# Patient Record
Sex: FEMALE | Race: WHITE | NOT HISPANIC OR LATINO | Employment: OTHER | URBAN - NONMETROPOLITAN AREA
[De-identification: names, ages, dates, MRNs, and addresses within clinical notes are randomized per-mention and may not be internally consistent; named-entity substitution may affect disease eponyms.]

---

## 2017-02-27 ENCOUNTER — TRANSCRIBE ORDERS (OUTPATIENT)
Dept: MAMMOGRAPHY | Facility: HOSPITAL | Age: 82
End: 2017-02-27

## 2017-02-27 DIAGNOSIS — Z13.9 SCREENING: Primary | ICD-10-CM

## 2017-04-03 ENCOUNTER — APPOINTMENT (OUTPATIENT)
Dept: MAMMOGRAPHY | Facility: HOSPITAL | Age: 82
End: 2017-04-03
Attending: INTERNAL MEDICINE

## 2017-04-04 ENCOUNTER — HOSPITAL ENCOUNTER (OUTPATIENT)
Dept: MAMMOGRAPHY | Facility: HOSPITAL | Age: 82
Discharge: HOME | End: 2017-04-04
Attending: INTERNAL MEDICINE | Admitting: INTERNAL MEDICINE

## 2017-04-04 DIAGNOSIS — Z13.9 SCREENING: ICD-10-CM

## 2017-04-04 PROCEDURE — 77063 BREAST TOMOSYNTHESIS BI: CPT

## 2017-04-04 PROCEDURE — G0202 SCR MAMMO BI INCL CAD: HCPCS

## 2017-09-08 ENCOUNTER — OFFICE VISIT (OUTPATIENT)
Dept: CARDIOLOGY | Facility: CLINIC | Age: 82
End: 2017-09-08

## 2017-09-08 VITALS
BODY MASS INDEX: 26.63 KG/M2 | WEIGHT: 156 LBS | HEART RATE: 76 BPM | SYSTOLIC BLOOD PRESSURE: 136 MMHG | DIASTOLIC BLOOD PRESSURE: 60 MMHG | HEIGHT: 64 IN

## 2017-09-08 DIAGNOSIS — I11.9 HYPERTENSIVE HEART DISEASE WITHOUT HEART FAILURE: ICD-10-CM

## 2017-09-08 DIAGNOSIS — E78.5 DYSLIPIDEMIA: ICD-10-CM

## 2017-09-08 DIAGNOSIS — R06.09 DYSPNEA ON EXERTION: ICD-10-CM

## 2017-09-08 DIAGNOSIS — I10 CHRONIC HYPERTENSION: Primary | ICD-10-CM

## 2017-09-08 DIAGNOSIS — G47.33 OBSTRUCTIVE SLEEP APNEA: ICD-10-CM

## 2017-09-08 DIAGNOSIS — R00.2 INTERMITTENT PALPITATIONS: ICD-10-CM

## 2017-09-08 PROCEDURE — 99213 OFFICE O/P EST LOW 20 MIN: CPT | Performed by: INTERNAL MEDICINE

## 2017-09-08 RX ORDER — BUSPIRONE HYDROCHLORIDE 5 MG/1
5 TABLET ORAL 2 TIMES DAILY
COMMUNITY
End: 2017-09-08 | Stop reason: SDUPTHER

## 2017-09-08 RX ORDER — BUSPIRONE HYDROCHLORIDE 5 MG/1
5 TABLET ORAL 2 TIMES DAILY
Qty: 180 TABLET | Refills: 1 | Status: SHIPPED | OUTPATIENT
Start: 2017-09-08 | End: 2019-02-22

## 2017-09-08 RX ORDER — TORSEMIDE 10 MG/1
10 TABLET ORAL DAILY
Qty: 90 TABLET | Refills: 1 | Status: SHIPPED | OUTPATIENT
Start: 2017-09-08 | End: 2018-09-07 | Stop reason: SDUPTHER
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
## 2017-09-28 ENCOUNTER — HOSPITAL ENCOUNTER (OUTPATIENT)
Dept: GENERAL RADIOLOGY | Facility: HOSPITAL | Age: 82
Discharge: HOME | End: 2017-09-28
Attending: INTERNAL MEDICINE | Admitting: INTERNAL MEDICINE

## 2017-09-28 ENCOUNTER — TRANSCRIBE ORDERS (OUTPATIENT)
Dept: ADMINISTRATIVE | Facility: HOSPITAL | Age: 82
End: 2017-09-28

## 2017-09-28 ENCOUNTER — HOSPITAL ENCOUNTER (OUTPATIENT)
Dept: GENERAL RADIOLOGY | Facility: HOSPITAL | Age: 82
Discharge: HOME | End: 2017-09-28
Attending: INTERNAL MEDICINE

## 2017-09-28 DIAGNOSIS — M54.9 BACK PAIN, UNSPECIFIED BACK LOCATION, UNSPECIFIED BACK PAIN LATERALITY, UNSPECIFIED CHRONICITY: ICD-10-CM

## 2017-09-28 DIAGNOSIS — M25.551 HIP PAIN, RIGHT: Primary | ICD-10-CM

## 2017-09-28 PROCEDURE — 72110 X-RAY EXAM L-2 SPINE 4/>VWS: CPT

## 2017-09-28 PROCEDURE — 73502 X-RAY EXAM HIP UNI 2-3 VIEWS: CPT

## 2018-03-09 ENCOUNTER — TRANSCRIBE ORDERS (OUTPATIENT)
Dept: ADMINISTRATIVE | Facility: HOSPITAL | Age: 83
End: 2018-03-09

## 2018-03-09 DIAGNOSIS — Z12.39 SCREENING BREAST EXAMINATION: Primary | ICD-10-CM

## 2018-04-12 ENCOUNTER — HOSPITAL ENCOUNTER (OUTPATIENT)
Dept: MAMMOGRAPHY | Facility: HOSPITAL | Age: 83
Discharge: HOME | End: 2018-04-12
Attending: INTERNAL MEDICINE | Admitting: INTERNAL MEDICINE

## 2018-04-12 DIAGNOSIS — Z12.39 SCREENING BREAST EXAMINATION: ICD-10-CM

## 2018-04-12 PROCEDURE — 77067 SCR MAMMO BI INCL CAD: CPT

## 2018-04-12 PROCEDURE — 77063 BREAST TOMOSYNTHESIS BI: CPT

## 2018-05-16 RX ORDER — NAPROXEN SODIUM 220 MG
220 TABLET ORAL AS NEEDED
COMMUNITY
End: 2019-02-22

## 2018-05-22 ENCOUNTER — HOSPITAL ENCOUNTER (OUTPATIENT)
Facility: HOSPITAL | Age: 83
Setting detail: HOSPITAL OUTPATIENT SURGERY
Discharge: HOME | End: 2018-05-22
Attending: OPHTHALMOLOGY | Admitting: OPHTHALMOLOGY

## 2018-05-22 VITALS
SYSTOLIC BLOOD PRESSURE: 132 MMHG | OXYGEN SATURATION: 98 % | TEMPERATURE: 98.6 F | DIASTOLIC BLOOD PRESSURE: 56 MMHG | HEART RATE: 76 BPM | RESPIRATION RATE: 18 BRPM | BODY MASS INDEX: 27.11 KG/M2 | WEIGHT: 153 LBS | HEIGHT: 63 IN

## 2018-05-22 PROBLEM — H26.492 POSTERIOR CAPSULAR OPACIFICATION OF LEFT EYE, OBSCURING VISION: Status: RESOLVED | Noted: 2018-05-22 | Resolved: 2018-05-22

## 2018-05-22 PROBLEM — H26.492 POSTERIOR CAPSULAR OPACIFICATION OF LEFT EYE, OBSCURING VISION: Status: ACTIVE | Noted: 2018-05-22

## 2018-05-22 RX ORDER — TETRACAINE HYDROCHLORIDE 5 MG/ML
SOLUTION OPHTHALMIC AS NEEDED
Status: DISCONTINUED | OUTPATIENT
Start: 2018-05-22 | End: 2018-05-22 | Stop reason: HOSPADM

## 2018-05-22 RX ORDER — PREDNISOLONE ACETATE 10 MG/ML
SUSPENSION/ DROPS OPHTHALMIC
Status: DISCONTINUED
Start: 2018-05-22 | End: 2018-05-22 | Stop reason: HOSPADM

## 2018-05-22 RX ORDER — CYCLOPENTOLATE HYDROCHLORIDE 20 MG/ML
1 SOLUTION/ DROPS OPHTHALMIC ONCE
Status: COMPLETED | OUTPATIENT
Start: 2018-05-22 | End: 2018-05-22

## 2018-05-22 RX ORDER — PREDNISOLONE ACETATE 10 MG/ML
SUSPENSION/ DROPS OPHTHALMIC AS NEEDED
Status: DISCONTINUED | OUTPATIENT
Start: 2018-05-22 | End: 2018-05-22 | Stop reason: HOSPADM

## 2018-05-22 RX ORDER — TETRACAINE HYDROCHLORIDE 5 MG/ML
SOLUTION OPHTHALMIC
Status: DISCONTINUED
Start: 2018-05-22 | End: 2018-05-22 | Stop reason: HOSPADM

## 2018-05-22 RX ORDER — PHENYLEPHRINE HYDROCHLORIDE 100 MG/ML
1 SOLUTION/ DROPS OPHTHALMIC ONCE
Status: COMPLETED | OUTPATIENT
Start: 2018-05-22 | End: 2018-05-22

## 2018-05-22 RX ORDER — BALANCED SALT SOLUTION 6.4; .75; .48; .3; 3.9; 1.7 MG/ML; MG/ML; MG/ML; MG/ML; MG/ML; MG/ML
SOLUTION OPHTHALMIC AS NEEDED
Status: DISCONTINUED | OUTPATIENT
Start: 2018-05-22 | End: 2018-05-22 | Stop reason: HOSPADM

## 2018-05-22 RX ORDER — BALANCED SALT SOLUTION 6.4; .75; .48; .3; 3.9; 1.7 MG/ML; MG/ML; MG/ML; MG/ML; MG/ML; MG/ML
SOLUTION OPHTHALMIC
Status: DISCONTINUED
Start: 2018-05-22 | End: 2018-05-22 | Stop reason: HOSPADM

## 2018-05-22 RX ADMIN — CYCLOPENTOLATE HYDROCHLORIDE 1 DROP: 20 SOLUTION/ DROPS OPHTHALMIC at 13:54

## 2018-05-22 RX ADMIN — PHENYLEPHRINE HYDROCHLORIDE 1 DROP: 100 SOLUTION/ DROPS OPHTHALMIC at 13:54

## 2018-05-22 RX ADMIN — APRACLONIDINE HYDROCHLORIDE 1 DROP: 10 SOLUTION/ DROPS OPHTHALMIC at 13:52

## 2018-09-07 ENCOUNTER — OFFICE VISIT (OUTPATIENT)
Dept: CARDIOLOGY | Facility: CLINIC | Age: 83
End: 2018-09-07

## 2018-09-07 ENCOUNTER — LAB (OUTPATIENT)
Dept: LAB | Facility: HOSPITAL | Age: 83
End: 2018-09-07

## 2018-09-07 VITALS
HEART RATE: 74 BPM | WEIGHT: 152.6 LBS | SYSTOLIC BLOOD PRESSURE: 151 MMHG | BODY MASS INDEX: 27.04 KG/M2 | DIASTOLIC BLOOD PRESSURE: 72 MMHG | HEIGHT: 63 IN

## 2018-09-07 DIAGNOSIS — I10 CHRONIC HYPERTENSION: Primary | ICD-10-CM

## 2018-09-07 DIAGNOSIS — I11.9 HYPERTENSIVE HEART DISEASE WITHOUT HEART FAILURE: ICD-10-CM

## 2018-09-07 DIAGNOSIS — E78.5 DYSLIPIDEMIA: ICD-10-CM

## 2018-09-07 DIAGNOSIS — I10 CHRONIC HYPERTENSION: ICD-10-CM

## 2018-09-07 DIAGNOSIS — R00.2 INTERMITTENT PALPITATIONS: ICD-10-CM

## 2018-09-07 DIAGNOSIS — G47.33 OBSTRUCTIVE SLEEP APNEA: ICD-10-CM

## 2018-09-07 LAB
25(OH)D3 SERPL-MCNC: 15 NG/ML
BNP SERPL-MCNC: 40 PG/ML (ref 0–100)
CK SERPL-CCNC: 60 U/L (ref 26–174)
D DIMER PPP FEU-MCNC: 0.19 MG/L (FEU) (ref 0–0.5)
ERYTHROCYTE [SEDIMENTATION RATE] IN BLOOD: 3 MM/HR (ref 0–30)
TSH SERPL DL<=0.05 MIU/L-ACNC: 4.66 MIU/ML (ref 0.35–5.35)

## 2018-09-07 PROCEDURE — 85379 FIBRIN DEGRADATION QUANT: CPT

## 2018-09-07 PROCEDURE — 83880 ASSAY OF NATRIURETIC PEPTIDE: CPT

## 2018-09-07 PROCEDURE — 82550 ASSAY OF CK (CPK): CPT

## 2018-09-07 PROCEDURE — 36415 COLL VENOUS BLD VENIPUNCTURE: CPT

## 2018-09-07 PROCEDURE — 82306 VITAMIN D 25 HYDROXY: CPT

## 2018-09-07 PROCEDURE — 85652 RBC SED RATE AUTOMATED: CPT

## 2018-09-07 PROCEDURE — 84443 ASSAY THYROID STIM HORMONE: CPT

## 2018-09-07 PROCEDURE — 99214 OFFICE O/P EST MOD 30 MIN: CPT | Performed by: INTERNAL MEDICINE

## 2018-09-07 RX ORDER — TORSEMIDE 10 MG/1
10 TABLET ORAL DAILY
Qty: 100 TABLET | Refills: 3 | Status: SHIPPED | OUTPATIENT
Start: 2018-09-07 | End: 2020-06-26

## 2018-09-07 NOTE — PROGRESS NOTES
Subjective:     Encounter Date:09/07/2018    Patient ID: Sal Siu is an 83 y.o.  white female, retired /supervisor, from [place], [place].     REFERRING PHYSICIAN/INTERNIST: Raj Nugent MD  PREVIOUS NEUROSURGEON: Derek Avery MD   NEUROSURGEON: Arron Gonzalez MD  OPHTHALMOLOGIST: Meseret Bentley MD  SLEEP PHYSICIAN: Raj Benito MD, [facility]    Chief Complaint:   Chief Complaint   Patient presents with   • Hypertension     Problem List:  1. Chronic hypertension, probably essential.  2. Hypertensive cardiovascular disease:  a. Remote progressive CCS class II-III chest pain syndrome/normal EKG and acceptable remote chest x-ray, November 2006.  b. Acceptable quantitative SPECT gated Cardiolite GXT with reduced exercise capacity, acceptable LVEF (0.80), March 2007.   c. Residual CCS class I angina pectoris with NYHA Class II exertional dyspnea and fatigue syndrome with acceptable echocardiogram, June 2012.  d. Residual class I symptoms.  3. Intermittent palpitations.   4. Dyslipidemia.   5. Elevated random serum glucose level, probable type 2 prediabetes mellitus, April 2008.  6. Borderline obesity.   7. Cataract, right eye.  8. Right infrascapular arthritis type pain with corticosteroid injection (data deficit), February 2009.  9. 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  10. Remote operations:  a. Appendectomy, 1946.  b. Lower back apparent lipoma removal (data deficit).   c. Dilation and curettage, 1970.  d. Hysterectomy, 1971.  e. Lumbar disc surgery, 1997.  f. Laparoscopic cholecystectomy (data deficit).   g. Posterior capsular opacification, left eye, May 2018    Allergies   Allergen Reactions   • Sulfa Antibiotics Swelling     urticaria   • Other Nausea And Vomiting     MUSHROOMS-CAUSE 'HALLUCINATIONS'         Current Outpatient Prescriptions:   •  amLODIPine (NORVASC) 5 MG tablet, Take 5 mg by mouth Daily., Disp: , Rfl:   •   "aspirin 81 MG EC tablet, Take 81 mg by mouth Daily., Disp: , Rfl:   •  atorvastatin (LIPITOR) 40 MG tablet, Take 40 mg by mouth Daily., Disp: , Rfl:   •  busPIRone (BUSPAR) 5 MG tablet, Take 1 tablet by mouth 2 (Two) Times a Day., Disp: 180 tablet, Rfl: 1  •  furosemide (LASIX) 20 MG tablet, Take 10 mg by mouth Daily As Needed., Disp: , Rfl:   •  HYDROcodone-acetaminophen (NORCO) 5-325 MG per tablet, Take 1 tablet by mouth Every 6 (Six) Hours As Needed., Disp: , Rfl:   •  lisinopril-hydrochlorothiazide (PRINZIDE,ZESTORETIC) 20-25 MG per tablet, Take 1 tablet by mouth Daily., Disp: , Rfl:   •  meclizine (ANTIVERT) 12.5 MG tablet, Take 12.5 mg by mouth 3 (Three) Times a Day As Needed for dizziness., Disp: , Rfl:   •  naproxen sodium (ALEVE) 220 MG tablet, Take 220 mg by mouth As Needed., Disp: , Rfl:   •  nitroglycerin (NITROSTAT) 0.4 MG SL tablet, Place 0.4 mg under the tongue Every 5 (Five) Minutes As Needed for chest pain. Take no more than 3 doses in 15 minutes., Disp: , Rfl:   •  omeprazole (PriLOSEC) 20 MG capsule, Take 20 mg by mouth Daily., Disp: , Rfl:   •  potassium chloride (K-DUR,KLOR-CON) 10 MEQ CR tablet, Take 10 mEq by mouth Daily., Disp: , Rfl:   •  torsemide (DEMADEX) 10 MG tablet, Take 1 tablet by mouth Daily., Disp: 90 tablet, Rfl: 1  •  traMADol (ULTRAM) 50 MG tablet, Take 50 mg by mouth Every 6 (Six) Hours As Needed for moderate pain (4-6)., Disp: , Rfl:   •  vitamin C (ASCORBIC ACID) 500 MG tablet, Take 500 mg by mouth Daily., Disp: , Rfl:     HISTORY OF PRESENT ILLNESS: Patient returns for scheduled annual followup. She says it has been almost a year since she had lab work drawn, but it was \"okay\" the last time it was done; data deficit.  Her , Kishore, is unable to do anything anymore, so she has to do \"everything.\"  She does all the housework, yard work, grocery shopping, etc.  She rests \"pretty good\" at night and sometimes awakens feeling well rested and sometimes not.  She is not on a " "good sleep schedule and is a night owl.  She sometimes down not go to bed until 1 or 2 AM and is always awake by 6:30 AM.  If she goes to bed before 11 PM, she is awake by 2 or 3 AM.  She has never tried melatonin.  She states that she had \"horrible\" pain in her left side last week, and it went down further to the side of her abdomen.  She thinks she must have pulled a muscle or something.  She had had to empty one of their dehumidifiers that morning, and she had trouble pulling it out.  She does not have any children, so she has no one to ask to help her.  She says she will have to call someone.  Other than that, she has had no chest pain, tightness, or pressure.  She does have some swelling in her feet, and she takes either her Lasix or torsemide most days, but not if she is not going to be at home.  Patient otherwise denies chest pain, shortness of breath, PND, edema, palpitations, syncope or presyncope at this time.        Review of Systems   Constitution: Positive for malaise/fatigue.   Respiratory: Positive for shortness of breath.    Musculoskeletal: Positive for back pain.      Obtained and otherwise negative except as outlined in problem list and HPI.    Procedures       Objective:       Vitals:    09/07/18 1056 09/07/18 1058   BP: 132/65 151/72   BP Location: Left arm Left arm   Patient Position: Sitting Standing   Pulse: 65 74   Weight: 69.2 kg (152 lb 9.6 oz)    Height: 160 cm (63\")      Body mass index is 27.03 kg/m².   Last weight:  156 lbs.    Physical Exam   Constitutional: She is oriented to person, place, and time. She appears well-developed and well-nourished.   Neck: No JVD present. Carotid bruit is not present. No thyromegaly present.   Cardiovascular: Regular rhythm, S1 normal and S2 normal.  Exam reveals no gallop, no S3 and no friction rub.    Murmur heard.   Medium-pitched harsh early systolic murmur is present with a grade of 1/6  at the upper right sternal border  Pulses:       Dorsalis " pedis pulses are 2+ on the right side, and 2+ on the left side.        Posterior tibial pulses are 2+ on the right side, and 2+ on the left side.   Pulmonary/Chest: Effort normal and breath sounds normal. She has no wheezes. She has no rhonchi. She has no rales.   Abdominal: Soft. She exhibits no mass. There is no hepatosplenomegaly. There is no tenderness. There is no guarding.   Bowel sounds audible x4   Musculoskeletal: Normal range of motion. She exhibits no edema.   Lymphadenopathy:     She has no cervical adenopathy.   Neurological: She is alert and oriented to person, place, and time.   Skin: Skin is warm, dry and intact. No rash noted.   Vitals reviewed.        Lab Review: No recent laboratory results available for review today    03/09/2018: (reviewed with patient in the office today)  · FLP - total cholesterol 133, triglycerides 141, HDL-C 62, LDL-C 43, non-HDL-C 71  · CBC - WBC 7.1, RBC 4.21, hemoglobin 12.7, hematocrit 40%, platelets 240, normal indices and differential  · CMP - sodium 144, potassium 4.1, chloride 105, CO2 - 27, glucose 102, BUN 12, creatinine 0.6, calcium 9.8, protein 6.5, albumin 4.6, alk phos 95, ALT 19, AST 12, bilirubin 0.8  · Hemoglobin A1c - 6.0%  · Estimated average glucose - 125        Assessment:   Overall continued acceptable course with no interim cardiopulmonary complaints with good functional status. We will defer additional diagnostic or therapeutic intervention from a cardiac perspective at this time.       Diagnosis Plan   1. Chronic hypertension  BNP    D-dimer, Quantitative    CK    Sedimentation Rate    Vitamin D 25 Hydroxy    TSH   2. Intermittent palpitations  BNP    D-dimer, Quantitative    CK    Sedimentation Rate    Vitamin D 25 Hydroxy    TSH   3. Hypertensive heart disease without heart failure  BNP    D-dimer, Quantitative    CK    Sedimentation Rate    Vitamin D 25 Hydroxy    TSH; No recurrent angina pectoris or CHF; continue current treatment     4.  Dyslipidemia  Acceptable control, March 2018   5. Obstructive sleep apnea  Compliance stressed; will add empiric melatonin          Plan:         1. Patient to continue current medications and close follow up with the above providers other than to alter her dose of torsemide to 10 mg 1-2 daily prn edema.  2. The following laboratory studies are ordered:   A. TSH   B. BNP   C. D-dimer   D. Vitamin  D   E. ESR   F. CPK  3. Melatonin 5 mg recommended to help with sleep; if this does not help, try 10 mg  4. Influenza immunization in the fall is strongly recommended.  5. Tentative cardiology follow up in September 2019, or patient may return sooner PRN.    Transcribed by Susan Christianson for Dr. Jeyson Land at 11:15 AM on 09/07/2018    I, Jeyson Land MD, [facility], personally performed the services described in this documentation as scribed by the above named individual in my presence, and it is both accurate and complete. At 11:38 AM on 09/07/2018

## 2019-02-22 ENCOUNTER — OFFICE VISIT (OUTPATIENT)
Dept: INTERNAL MEDICINE | Facility: CLINIC | Age: 84
End: 2019-02-22

## 2019-02-22 VITALS
SYSTOLIC BLOOD PRESSURE: 124 MMHG | WEIGHT: 156 LBS | HEART RATE: 74 BPM | DIASTOLIC BLOOD PRESSURE: 70 MMHG | TEMPERATURE: 99.5 F | HEIGHT: 63 IN | BODY MASS INDEX: 27.64 KG/M2 | OXYGEN SATURATION: 97 %

## 2019-02-22 DIAGNOSIS — E55.9 VITAMIN D DEFICIENCY: ICD-10-CM

## 2019-02-22 DIAGNOSIS — I10 CHRONIC HYPERTENSION: Primary | ICD-10-CM

## 2019-02-22 DIAGNOSIS — R53.83 OTHER FATIGUE: ICD-10-CM

## 2019-02-22 DIAGNOSIS — R42 VERTIGO: ICD-10-CM

## 2019-02-22 DIAGNOSIS — E78.5 DYSLIPIDEMIA: ICD-10-CM

## 2019-02-22 PROBLEM — R06.09 DYSPNEA ON EXERTION: Status: RESOLVED | Noted: 2017-09-08 | Resolved: 2019-02-22

## 2019-02-22 PROCEDURE — 99204 OFFICE O/P NEW MOD 45 MIN: CPT | Performed by: INTERNAL MEDICINE

## 2019-02-22 NOTE — PROGRESS NOTES
"Subjective  Sal Siu is a 83 y.o. female    HPI 83-year-old female accompanied by her friend coming in to establish care here has had a long-standing history of hypertension, dyslipidemia and reflux esophagitis.  Has had complains of occasional vertigo with a sensation of room spinning around her.  This happens to her when she turns over in the bed.  Denies hearing loss no associated nausea vomiting.  Has been on low-dose diuretics for her lower extremity swelling.  Denies orthopnea or dyspnea on exertion    The following portions of the patient's history were reviewed and updated as appropriate: allergies, current medications, past family history, past medical history, past social history, past surgical history, and problem list.     Review of Systems   Constitutional: Negative.  Negative for activity change, appetite change, fatigue and fever.   HENT: Negative for congestion, ear discharge, ear pain and trouble swallowing.    Eyes: Negative for photophobia and visual disturbance.   Respiratory: Negative for cough and shortness of breath.    Cardiovascular: Negative for chest pain and palpitations.   Gastrointestinal: Negative for abdominal distention, abdominal pain, constipation, diarrhea, nausea and vomiting.   Endocrine: Negative.    Genitourinary: Negative for dysuria, hematuria and urgency.   Musculoskeletal: Positive for arthralgias. Negative for back pain, joint swelling and myalgias.   Skin: Negative for color change and rash.   Allergic/Immunologic: Negative.    Neurological: Positive for dizziness. Negative for weakness, light-headedness and headaches.   Hematological: Negative for adenopathy. Does not bruise/bleed easily.   Psychiatric/Behavioral: Negative for agitation, confusion and dysphoric mood. The patient is not nervous/anxious.        Visit Vitals  /70 Comment: AUTOMATIC   Pulse 74   Temp 99.5 °F (37.5 °C) (Temporal)   Ht 160 cm (63\")   Wt 70.8 kg (156 lb)   LMP  (LMP Unknown)   SpO2 " 97%   BMI 27.63 kg/m²       Objective  Physical Exam   Constitutional: She is oriented to person, place, and time. She appears well-developed and well-nourished. No distress.   HENT:   Nose: Nose normal.   Mouth/Throat: Oropharynx is clear and moist.   Eyes: Conjunctivae and EOM are normal. No scleral icterus.   Neck: No tracheal deviation present. No thyromegaly present.   Cardiovascular: Normal rate and regular rhythm. Exam reveals no friction rub.   No murmur heard.  Pulmonary/Chest: No respiratory distress. She has no wheezes. She has no rales.   Abdominal: Soft. She exhibits no distension and no mass. There is no tenderness. There is no guarding.   Musculoskeletal: Normal range of motion. She exhibits deformity.   Lymphadenopathy:     She has no cervical adenopathy.   Neurological: She is alert and oriented to person, place, and time. She has normal reflexes. No cranial nerve deficit. Coordination normal.   Vertigo reproduced with Merlin-Hallpike maneuver on the left side   Skin: Skin is warm and dry. No rash noted. No erythema.   Psychiatric: She has a normal mood and affect. Her behavior is normal. Judgment and thought content normal.       Diagnoses and all orders for this visit:    Chronic hypertension discussed low-salt diet continue with low-sodium diet she is on 2 different diuretics I would like to discontinue HCTZ with a future visit in the meantime continue to follow her BP readings.  Consider switching from amlodipine to a higher dose lisinopril.  This may help with reducing lower extremity swelling and may then help us cut down her use of torsemide    Dyslipidemia continue with the statins check lipid profile    Vertigo referral for physical therapy.  Meclizine as needed

## 2019-02-25 LAB
25(OH)D3+25(OH)D2 SERPL-MCNC: 47.2 NG/ML
ALBUMIN SERPL-MCNC: 4.1 G/DL (ref 3.5–5)
ALBUMIN/GLOB SERPL: 1.5 G/DL (ref 1–2)
ALP SERPL-CCNC: 95 U/L (ref 38–126)
ALT SERPL-CCNC: 41 U/L (ref 13–69)
AST SERPL-CCNC: 18 U/L (ref 15–46)
BILIRUB SERPL-MCNC: 0.9 MG/DL (ref 0.2–1.3)
BUN SERPL-MCNC: 19 MG/DL (ref 7–20)
BUN/CREAT SERPL: 31.7 (ref 7.1–23.5)
CALCIUM SERPL-MCNC: 10.1 MG/DL (ref 8.4–10.2)
CHLORIDE SERPL-SCNC: 102 MMOL/L (ref 98–107)
CHOLEST SERPL-MCNC: 141 MG/DL (ref 0–199)
CO2 SERPL-SCNC: 29 MMOL/L (ref 26–30)
CREAT SERPL-MCNC: 0.6 MG/DL (ref 0.6–1.3)
ERYTHROCYTE [DISTWIDTH] IN BLOOD BY AUTOMATED COUNT: 12.2 % (ref 11.5–14.5)
GLOBULIN SER CALC-MCNC: 2.8 GM/DL
GLUCOSE SERPL-MCNC: 107 MG/DL (ref 74–98)
HCT VFR BLD AUTO: 38.8 % (ref 37–47)
HDLC SERPL-MCNC: 58 MG/DL (ref 40–60)
HGB BLD-MCNC: 12.7 G/DL (ref 12–16)
LDLC SERPL CALC-MCNC: 53 MG/DL (ref 0–99)
MCH RBC QN AUTO: 30.1 PG (ref 27–31)
MCHC RBC AUTO-ENTMCNC: 32.7 G/DL (ref 30–37)
MCV RBC AUTO: 91.9 FL (ref 81–99)
PLATELET # BLD AUTO: 230 10*3/MM3 (ref 130–400)
POTASSIUM SERPL-SCNC: 3.8 MMOL/L (ref 3.5–5.1)
PROT SERPL-MCNC: 6.9 G/DL (ref 6.3–8.2)
RBC # BLD AUTO: 4.22 10*6/MM3 (ref 4.2–5.4)
SODIUM SERPL-SCNC: 138 MMOL/L (ref 137–145)
TRIGL SERPL-MCNC: 152 MG/DL
TSH SERPL DL<=0.005 MIU/L-ACNC: 4.51 MIU/ML (ref 0.47–4.68)
VIT B12 SERPL-MCNC: 248 PG/ML (ref 239–931)
VLDLC SERPL CALC-MCNC: 30.4 MG/DL
WBC # BLD AUTO: 6.03 10*3/MM3 (ref 4.8–10.8)

## 2019-05-02 ENCOUNTER — TELEPHONE (OUTPATIENT)
Dept: INTERNAL MEDICINE | Facility: CLINIC | Age: 84
End: 2019-05-02

## 2019-05-02 RX ORDER — AMLODIPINE BESYLATE 5 MG/1
5 TABLET ORAL DAILY
Qty: 90 TABLET | Refills: 3 | Status: SHIPPED | OUTPATIENT
Start: 2019-05-02 | End: 2020-05-15

## 2019-05-02 RX ORDER — LISINOPRIL AND HYDROCHLOROTHIAZIDE 25; 20 MG/1; MG/1
1 TABLET ORAL DAILY
Qty: 90 TABLET | Refills: 3 | Status: SHIPPED | OUTPATIENT
Start: 2019-05-02 | End: 2019-05-23

## 2019-05-23 ENCOUNTER — OFFICE VISIT (OUTPATIENT)
Dept: INTERNAL MEDICINE | Facility: CLINIC | Age: 84
End: 2019-05-23

## 2019-05-23 VITALS
HEIGHT: 63 IN | BODY MASS INDEX: 27.61 KG/M2 | OXYGEN SATURATION: 99 % | TEMPERATURE: 99.3 F | DIASTOLIC BLOOD PRESSURE: 69 MMHG | WEIGHT: 155.8 LBS | HEART RATE: 77 BPM | SYSTOLIC BLOOD PRESSURE: 127 MMHG

## 2019-05-23 DIAGNOSIS — I10 BENIGN ESSENTIAL HTN: ICD-10-CM

## 2019-05-23 DIAGNOSIS — E78.5 DYSLIPIDEMIA: Primary | ICD-10-CM

## 2019-05-23 DIAGNOSIS — K21.9 GASTROESOPHAGEAL REFLUX DISEASE, ESOPHAGITIS PRESENCE NOT SPECIFIED: ICD-10-CM

## 2019-05-23 PROCEDURE — 99214 OFFICE O/P EST MOD 30 MIN: CPT | Performed by: INTERNAL MEDICINE

## 2019-05-23 RX ORDER — ATORVASTATIN CALCIUM 40 MG/1
40 TABLET, FILM COATED ORAL DAILY
Qty: 90 TABLET | Refills: 3 | Status: SHIPPED | OUTPATIENT
Start: 2019-05-23 | End: 2020-05-15

## 2019-05-23 RX ORDER — LISINOPRIL 20 MG/1
20 TABLET ORAL DAILY
Qty: 90 TABLET | Refills: 3 | Status: SHIPPED | OUTPATIENT
Start: 2019-05-23 | End: 2019-09-11

## 2019-05-23 NOTE — PROGRESS NOTES
"Subjective  Sal Siu is a 83 y.o. female    HPI coming in for follow-up patient with chronic lower extremity edema she has hypertension and reflux esophagitis takes Demadex almost on a daily basis this was prescribed to her by her previous physician.  She denies lightheadedness dizziness    The following portions of the patient's history were reviewed and updated as appropriate: allergies, current medications, past family history, past medical history, past social history, past surgical history, and problem list.     Review of Systems   Constitutional: Positive for fatigue. Negative for activity change, appetite change and fever.   HENT: Negative for congestion, ear discharge, ear pain and trouble swallowing.    Eyes: Negative for photophobia and visual disturbance.   Respiratory: Negative for cough and shortness of breath.    Cardiovascular: Negative for chest pain and palpitations.   Gastrointestinal: Negative for abdominal distention, abdominal pain, constipation, diarrhea, nausea and vomiting.   Endocrine: Negative.    Genitourinary: Negative for dysuria, hematuria and urgency.   Musculoskeletal: Positive for arthralgias. Negative for back pain, joint swelling and myalgias.   Skin: Negative for color change and rash.   Allergic/Immunologic: Negative.    Neurological: Negative for dizziness, weakness, light-headedness and headaches.   Hematological: Negative for adenopathy. Does not bruise/bleed easily.   Psychiatric/Behavioral: Positive for sleep disturbance. Negative for agitation, confusion and dysphoric mood. The patient is not nervous/anxious.        Visit Vitals  /69 Comment: Automatic   Pulse 77   Temp 99.3 °F (37.4 °C) (Temporal)   Ht 160 cm (63\")   Wt 70.7 kg (155 lb 12.8 oz)   LMP  (LMP Unknown)   SpO2 99%   BMI 27.60 kg/m²       Objective  Physical Exam   Constitutional: She is oriented to person, place, and time. She appears well-developed and well-nourished. No distress.   HENT:   Nose: " Nose normal.   Mouth/Throat: Oropharynx is clear and moist.   Eyes: Conjunctivae and EOM are normal. No scleral icterus.   Neck: No tracheal deviation present. No thyromegaly present.   Cardiovascular: Normal rate and regular rhythm. Exam reveals no friction rub.   No murmur heard.  Pulmonary/Chest: No respiratory distress. She has no wheezes. She has no rales.   Abdominal: Soft. She exhibits no distension and no mass. There is no tenderness. There is no guarding.   Musculoskeletal: Normal range of motion. She exhibits edema and deformity.   Lymphadenopathy:     She has no cervical adenopathy.   Neurological: She is alert and oriented to person, place, and time. She has normal reflexes. No cranial nerve deficit. Coordination normal.   Skin: Skin is warm and dry. No rash noted. No erythema.   Psychiatric: She has a normal mood and affect. Her behavior is normal. Judgment and thought content normal.       Diagnoses and all orders for this visit:    Dyslipidemia stable continue with the statins    Benign essential HTN will discontinue HCTZ to avoid dual diuretic therapy will continue with lisinopril at a higher dose follow BP readings at home    Gastroesophageal reflux disease, esophagitis presence not specified continue with reflux precautions and proton pump inhibitors  Discussed recent lab work which showed mild dehydration otherwise unremarkable    Other orders  -     atorvastatin (LIPITOR) 40 MG tablet; Take 1 tablet by mouth Daily.

## 2019-06-17 ENCOUNTER — OFFICE VISIT (OUTPATIENT)
Dept: INTERNAL MEDICINE | Facility: CLINIC | Age: 84
End: 2019-06-17

## 2019-06-17 VITALS
HEIGHT: 63 IN | HEART RATE: 74 BPM | OXYGEN SATURATION: 98 % | DIASTOLIC BLOOD PRESSURE: 65 MMHG | SYSTOLIC BLOOD PRESSURE: 125 MMHG | WEIGHT: 153.12 LBS | BODY MASS INDEX: 27.13 KG/M2 | TEMPERATURE: 98.4 F

## 2019-06-17 DIAGNOSIS — E78.5 DYSLIPIDEMIA: ICD-10-CM

## 2019-06-17 DIAGNOSIS — I10 BENIGN ESSENTIAL HTN: Primary | ICD-10-CM

## 2019-06-17 PROCEDURE — G0438 PPPS, INITIAL VISIT: HCPCS | Performed by: INTERNAL MEDICINE

## 2019-06-17 NOTE — PROGRESS NOTES
QUICK REFERENCE INFORMATION:  The ABCs of the Annual Wellness Visit    Initial Medicare Wellness Visit    HEALTH RISK ASSESSMENT  83-year-old female with hypertension and dyslipidemia coming in for a wellness visit  : 1935    Recent Hospitalizations:  No hospitalization(s) within the last year..  ccc      Current Medical Providers:  Patient Care Team:  David Barrios MD as PCP - General (Internal Medicine)  Raj Nugent MD as PCP - Family Medicine  Raj Nugent MD as PCP - Claims Attributed        Smoking Status:  Social History     Tobacco Use   Smoking Status Never Smoker   Smokeless Tobacco Never Used       Alcohol Consumption:  Social History     Substance and Sexual Activity   Alcohol Use No       Depression Screen:   PHQ-2/PHQ-9 Depression Screening 2019   Little interest or pleasure in doing things 0   Feeling down, depressed, or hopeless 0   Total Score 0       Health Habits and Functional and Cognitive Screening:  Functional & Cognitive Status 2019   Do you have difficulty preparing food and eating? No   Do you have difficulty bathing yourself, getting dressed or grooming yourself? No   Do you have difficulty using the toilet? No   Do you have difficulty moving around from place to place? No   Do you have trouble with steps or getting out of a bed or a chair? No   In the past year have you fallen or experienced a near fall? Yes   Current Diet Well Balanced Diet   Dental Exam Up to date   Eye Exam Up to date   Exercise (times per week) 0 times per week   Current Exercise Activities Include No Regular Exercise   Do you need help using the phone?  No   Are you deaf or do you have serious difficulty hearing?  No   Do you need help with transportation? No   Do you need help shopping? No   Do you need help preparing meals?  No   Do you need help with housework?  No   Do you need help with laundry? No   Do you need help taking your medications? No   Do you need help managing  money? No   Do you ever drive or ride in a car without wearing a seat belt? No   Have you felt unusual stress, anger or loneliness in the last month? No   Who do you live with? Spouse   If you need help, do you have trouble finding someone available to you? No   Have you been bothered in the last four weeks by sexual problems? No   Do you have difficulty concentrating, remembering or making decisions? No           Does the patient have evidence of cognitive impairment? No    Asiprin use counseling: Does not need ASA (and currently is not on it)      Recent Lab Results:    Lab Results   Component Value Date     (H) 02/25/2019        Lab Results   Component Value Date    TRIG 152 (H) 02/25/2019    HDL 58 02/25/2019    VLDL 30.4 02/25/2019           Age-appropriate Screening Schedule:  Refer to the list below for future screening recommendations based on patient's age, sex and/or medical conditions. Orders for these recommended tests are listed in the plan section. The patient has been provided with a written plan.    Health Maintenance   Topic Date Due   • ZOSTER VACCINE (1 of 2) 06/17/2020 (Originally [birth year]/1985)   • INFLUENZA VACCINE  08/01/2019   • LIPID PANEL  02/25/2020   • TDAP/TD VACCINES (2 - Td) 12/17/2023   • PNEUMOCOCCAL VACCINES (65+ LOW/MEDIUM RISK)  Discontinued        Subjective   History of Present Illness    Sal Siu is a 83 y.o. female who presents for an Annual Wellness Visit.    The following portions of the patient's history were reviewed and updated as appropriate: allergies, current medications, past family history, past medical history, past social history, past surgical history and problem list.    Outpatient Medications Prior to Visit   Medication Sig Dispense Refill   • amLODIPine (NORVASC) 5 MG tablet Take 1 tablet by mouth Daily. 90 tablet 3   • atorvastatin (LIPITOR) 40 MG tablet Take 1 tablet by mouth Daily. 90 tablet 3   • Cholecalciferol (VITAMIN D3) 5000 units capsule  capsule Take 1 capsule by mouth Daily. 30 capsule 6   • lisinopril (PRINIVIL,ZESTRIL) 20 MG tablet Take 1 tablet by mouth Daily. 90 tablet 3   • meclizine (ANTIVERT) 12.5 MG tablet Take 12.5 mg by mouth 3 (Three) Times a Day As Needed for dizziness.     • omeprazole (PriLOSEC) 20 MG capsule Take 20 mg by mouth Daily.     • torsemide (DEMADEX) 10 MG tablet Take 1 tablet by mouth Daily. Take an extra 10 mg tablet as need no more than once a week. 100 tablet 3     No facility-administered medications prior to visit.        Patient Active Problem List   Diagnosis   • Benign essential HTN   • Intermittent palpitations   • Dyslipidemia       Advance Care Planning:  Patient has an advance directive - a copy has not been provided. Have asked the patient to send this to us to add to record    Identification of Risk Factors:  Risk factors include: increased fall risk, incontinence and polypharmacy.    Review of Systems   Constitutional: Negative.  Negative for activity change, appetite change, fatigue and fever.   HENT: Negative for congestion, ear discharge, ear pain and trouble swallowing.    Eyes: Negative for photophobia and visual disturbance.   Respiratory: Negative for cough and shortness of breath.    Cardiovascular: Negative for chest pain and palpitations.   Gastrointestinal: Negative for abdominal distention, abdominal pain, constipation, diarrhea, nausea and vomiting.   Endocrine: Negative.    Genitourinary: Negative for dysuria, hematuria and urgency.   Musculoskeletal: Positive for arthralgias. Negative for back pain, joint swelling and myalgias.        Rt ankle pain   Skin: Negative for color change and rash.   Allergic/Immunologic: Negative.    Neurological: Negative for dizziness, weakness, light-headedness and headaches.   Hematological: Negative for adenopathy. Does not bruise/bleed easily.   Psychiatric/Behavioral: Negative for agitation, confusion and dysphoric mood. The patient is not nervous/anxious.   "      Compared to one year ago, the patient feels her physical health is the same.  Compared to one year ago, the patient feels her mental health is the same.    Objective     Physical Exam   Constitutional: She is oriented to person, place, and time. She appears well-developed and well-nourished. No distress.   HENT:   Nose: Nose normal.   Mouth/Throat: Oropharynx is clear and moist.   Eyes: Conjunctivae and EOM are normal. No scleral icterus.   Neck: No tracheal deviation present. No thyromegaly present.   Cardiovascular: Normal rate and regular rhythm. Exam reveals no friction rub.   No murmur heard.  Pulmonary/Chest: No respiratory distress. She has no wheezes. She has no rales.   Abdominal: Soft. She exhibits no distension and no mass. There is no tenderness. There is no guarding.   Musculoskeletal: Normal range of motion. She exhibits edema and deformity.   Lymphadenopathy:     She has no cervical adenopathy.   Neurological: She is alert and oriented to person, place, and time. She has normal reflexes. No cranial nerve deficit. Coordination normal.   Skin: Skin is warm and dry. No rash noted. No erythema.   Psychiatric: She has a normal mood and affect. Her behavior is normal. Judgment and thought content normal.       Vitals:    06/17/19 1031   BP: 125/65   Pulse: 74   Temp: 98.4 °F (36.9 °C)   TempSrc: Temporal   SpO2: 98%   Weight: 69.5 kg (153 lb 1.9 oz)   Height: 160 cm (63\")   PainSc:   5   PainLoc: Foot       Patient's Body mass index is 27.12 kg/m². BMI is above normal parameters. Recommendations include: nutrition counseling.      Assessment/Plan   Patient Self-Management and Personalized Health Advice  The patient has been provided with information about: weight management, fall prevention and designing advance directives and preventive services including:   · Advance directive, Fall Risk assessment done, Fall Risk plan of care done, Urinary Incontinence assessment done.    Visit Diagnoses:    " ICD-10-CM ICD-9-CM   1. Benign essential HTN stable with current meds and low-sodium diet I10 401.1   2. Dyslipidemia continue with the diet restrictions, statins recent lipid profile okay E78.5 272.4       No orders of the defined types were placed in this encounter.      Outpatient Encounter Medications as of 6/17/2019   Medication Sig Dispense Refill   • amLODIPine (NORVASC) 5 MG tablet Take 1 tablet by mouth Daily. 90 tablet 3   • atorvastatin (LIPITOR) 40 MG tablet Take 1 tablet by mouth Daily. 90 tablet 3   • Cholecalciferol (VITAMIN D3) 5000 units capsule capsule Take 1 capsule by mouth Daily. 30 capsule 6   • lisinopril (PRINIVIL,ZESTRIL) 20 MG tablet Take 1 tablet by mouth Daily. 90 tablet 3   • meclizine (ANTIVERT) 12.5 MG tablet Take 12.5 mg by mouth 3 (Three) Times a Day As Needed for dizziness.     • omeprazole (PriLOSEC) 20 MG capsule Take 20 mg by mouth Daily.     • torsemide (DEMADEX) 10 MG tablet Take 1 tablet by mouth Daily. Take an extra 10 mg tablet as need no more than once a week. 100 tablet 3     No facility-administered encounter medications on file as of 6/17/2019.        Reviewed use of high risk medication in the elderly: yes  Reviewed for potential of harmful drug interactions in the elderly: yes    Follow Up:  No Follow-up on file.     An After Visit Summary and PPPS with all of these plans were given to the patient.

## 2019-06-17 NOTE — PATIENT INSTRUCTIONS
Medicare Wellness  Personal Prevention Plan of Service     Date of Office Visit:  2019  Encounter Provider:  David Barrios MD  Place of Service:  [facility]  Patient Name: Sal Siu  :  1935    As part of the Medicare Wellness portion of your visit today, we are providing you with this personalized preventive plan of services (PPPS). This plan is based upon recommendations of the [place] Preventive Services Task Force (USPSTF) and the Advisory Committee on Immunization Practices (ACIP).    This lists the preventive care services that should be considered, and provides dates of when you are due. Items listed as completed are up-to-date and do not require any further intervention.    Health Maintenance   Topic Date Due   • ZOSTER VACCINE (1 of 2) 2020 (Originally 1985)   • INFLUENZA VACCINE  2019   • LIPID PANEL  2020   • MEDICARE ANNUAL WELLNESS  2020   • TDAP/TD VACCINES (2 - Td) 2023   • PNEUMOCOCCAL VACCINES (65+ LOW/MEDIUM RISK)  Discontinued       No orders of the defined types were placed in this encounter.      No Follow-up on file.

## 2019-08-07 ENCOUNTER — TELEPHONE (OUTPATIENT)
Dept: INTERNAL MEDICINE | Facility: CLINIC | Age: 84
End: 2019-08-07

## 2019-08-07 NOTE — TELEPHONE ENCOUNTER
Patient came into the office today requesting a refill on her Omeprazole 20 MG x 1 day.     She is out of the medication.     [facility]

## 2019-08-08 RX ORDER — OMEPRAZOLE 20 MG/1
20 CAPSULE, DELAYED RELEASE ORAL DAILY
Qty: 90 CAPSULE | Refills: 3 | Status: SHIPPED | OUTPATIENT
Start: 2019-08-08 | End: 2020-08-13

## 2019-09-10 NOTE — PROGRESS NOTES
Subjective:     Encounter Date:09/11/2019    Patient ID: Sal Siu is an 84 y.o.  white female, retired /supervisor, from [place], [place].     REFERRING PHYSICIAN/INTERNIST: Raj Nugent MD  CURRENT INTERNIST:  David Barrios MD  PREVIOUS NEUROSURGEON: Derek Avery MD   NEUROSURGEON: Arron Gonzalez MD  OPHTHALMOLOGIST: Meseret Bentley MD  SLEEP PHYSICIAN: Raj Benito MD, [facility]    Chief Complaint:   Chief Complaint   Patient presents with   • Hypertension     Problem List:  1. Chronic hypertension, probably essential.  2. Hypertensive cardiovascular disease:  a. Remote progressive CCS class II-III chest pain syndrome/normal EKG and acceptable remote chest x-ray, November 2006.  b. Acceptable quantitative SPECT gated Cardiolite GXT with reduced exercise capacity, acceptable LVEF (0.80), March 2007.   c. Residual CCS class I angina pectoris with NYHA Class II exertional dyspnea and fatigue syndrome with acceptable echocardiogram, June 2012.  d. Residual CCS class 0 angina pectoris/NYHA class II exertional dyspnea and fatigue with persistent pedal edema, September 2019  3. Intermittent palpitations.   4. Dyslipidemia.   5. Elevated random serum glucose level, probable type 2 prediabetes mellitus, April 2008.  6. Borderline obesity.   7. Cataract, right eye.  8. Right infrascapular arthritis type pain with corticosteroid injection (data deficit), February 2009.  9. 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  10. Remote operations:  a. Appendectomy, 1946.  b. Lower back apparent lipoma removal (data deficit).   c. Dilation and curettage, 1970.  d. Hysterectomy, 1971.  e. Lumbar disc surgery, 1997.  f. Laparoscopic cholecystectomy (data deficit).   g. Posterior capsular opacification, left eye, May 2018    Allergies   Allergen Reactions   • Sulfa Antibiotics Swelling     urticaria   • Other Nausea And Vomiting     MUSHROOMS-CAUSE  "'HALLUCINATIONS'       Current Outpatient Medications:   •  amLODIPine (NORVASC) 5 MG tablet, Take 1 tablet by mouth Daily., Disp: 90 tablet, Rfl: 3  •  Ascorbic Acid (VITAMIN C PO), Take  by mouth Daily., Disp: , Rfl:   •  atorvastatin (LIPITOR) 40 MG tablet, Take 1 tablet by mouth Daily., Disp: 90 tablet, Rfl: 3  •  Cholecalciferol (VITAMIN D3) 5000 units capsule capsule, Take 1 capsule by mouth Daily., Disp: 30 capsule, Rfl: 6  •  Cyanocobalamin (VITAMIN B-12 PO), Take  by mouth Daily., Disp: , Rfl:   •  lisinopril-hydrochlorothiazide (PRINZIDE,ZESTORETIC) 20-25 MG per tablet, Take 1 tablet by mouth Daily., Disp: , Rfl:   •  meclizine (ANTIVERT) 12.5 MG tablet, Take 12.5 mg by mouth 3 (Three) Times a Day As Needed for dizziness., Disp: , Rfl:   •  omeprazole (priLOSEC) 20 MG capsule, Take 1 capsule by mouth Daily., Disp: 90 capsule, Rfl: 3  •  torsemide (DEMADEX) 10 MG tablet, Take 1 tablet by mouth Daily. Take an extra 10 mg tablet as need no more than once a week., Disp: 100 tablet, Rfl: 3    History of Present Illness: Patient returns for scheduled annual followup. She says that she has done well since last being seen in our office other than \"falling a lot.\"  She does not pass out; she just has been \"clumsy.\"  She twisted her ankle one time, but she had been sitting down for a long time, and she thinks her foot was asleep.  She did not take her torsemide yesterday because she was \"out and about,\" and her ankles are swollen this morning.  She has not had any chest pain, tightness, or pressure but does have some shortness of breath.  She does all of her own housework and running of errands; her  is unable to help her at all.  She and her  just recently went to [place], [place] to an antique glass convention.  He likes to collect this antique glass; she says \"if he sees something he likes, he buys it, and we don't need it.\"  The patient is scheduled for followup with Dr. Barrios in November 2019.  " "Alterations in medication are discussed with her, and she is told that she will need to have labs drawn in a week or 2.  Patient otherwise denies chest pain, PND, palpitations, syncope or presyncope at this time.        Review of Systems   Constitution: Positive for malaise/fatigue.   Cardiovascular: Positive for dyspnea on exertion.   Respiratory: Positive for shortness of breath.    Endocrine:        Excessive hunger   Skin: Positive for dry skin.   Musculoskeletal: Positive for arthritis, back pain, falls and muscle cramps.   Neurological: Positive for excessive daytime sleepiness and loss of balance.      Obtained and negative except as outlined in problem list and HPI.    Procedures       Objective:       Vitals:    09/11/19 1106 09/11/19 1107   BP: 142/70 138/64   BP Location: Left arm Left arm   Patient Position: Sitting Standing   Pulse: 68 61   Weight: 70.3 kg (155 lb)    Height: 158.8 cm (62.5\")      Body mass index is 27.9 kg/m².   Last weight:  152 lbs.    Physical Exam   Constitutional: She is oriented to person, place, and time. She appears well-developed and well-nourished.   Neck: No JVD present. Carotid bruit is not present. No thyromegaly present.   Cardiovascular: Regular rhythm, S1 normal and S2 normal. Exam reveals no gallop, no S3 and no friction rub.   Murmur heard.   Medium-pitched early systolic murmur is present with a grade of 2/6 at the lower left sternal border.  Pulses:       Carotid pulses are 1+ on the right side, and 1+ on the left side.       Radial pulses are 1+ on the right side, and 1+ on the left side.        Femoral pulses are 1+ on the right side, and 1+ on the left side.       Popliteal pulses are 1+ on the right side, and 1+ on the left side.        Dorsalis pedis pulses are 1+ on the right side, and 1+ on the left side.        Posterior tibial pulses are 1+ on the right side, and 1+ on the left side.   Pulmonary/Chest: Effort normal. She has decreased breath sounds. She has " no wheezes. She has no rhonchi. She has no rales.   Abdominal: Soft. She exhibits no mass. There is no hepatosplenomegaly. There is no tenderness. There is no guarding.   Bowel sounds audible x4   Musculoskeletal: Normal range of motion. She exhibits edema (1+ bipedal).   Lymphadenopathy:     She has no cervical adenopathy.   Neurological: She is alert and oriented to person, place, and time.   Skin: Skin is warm, dry and intact. No rash noted.   Vitals reviewed.        Lab Review:   Lab Results   Component Value Date    BUN 19 02/25/2019    CREATININE 0.60 02/25/2019    EGFRIFNONA 95 02/25/2019    EGFRIFAFRI 116 02/25/2019    BCR 31.7 (H) 02/25/2019    CO2 29.0 02/25/2019    CALCIUM 10.1 02/25/2019    PROTENTOTREF 6.9 02/25/2019    ALBUMIN 4.10 02/25/2019    LABIL2 1.5 02/25/2019    AST 18 02/25/2019    ALT 41 02/25/2019   Sodium - 138  Potassium - 3.8  Chloride - 102    Lab Results   Component Value Date    WBC 6.03 02/25/2019    HGB 12.7 02/25/2019    HCT 38.8 02/25/2019    MCV 91.9 02/25/2019     02/25/2019       Lab Results   Component Value Date    TSH 4.510 02/25/2019       Lab Results   Component Value Date    TRIG 152 (H) 02/25/2019     Lab Results   Component Value Date    HDL 58 02/25/2019 02/25/2019:  · Total cholesterol 141, LDL-C 53  · Vitamin D - 47.2  · Vitamin B12 - 248    09/07/2018: (reviewed with patient by letter)  · BNP - 40.0  · TSH - 4.662  · Vitamin D - 15.0  · Sed rate - 3  · Creatine kinase - 60  · D-dimer 0.19      Assessment:       Overall continued acceptable course with no new interim cardiopulmonary complaints with acceptable functional status. We will defer additional diagnostic or therapeutic intervention from a cardiac perspective at this time other than to make some adjustment in her diuretic therapy.     Diagnosis Plan   1. Benign essential HTN  Adult Transthoracic Echo Complete W/ Cont if Necessary Per Protocol    Basic Metabolic Panel    Magnesium   2. Intermittent  palpitations  Adult Transthoracic Echo Complete W/ Cont if Necessary Per Protocol   3. Dyslipidemia  No recent laboratory to review; continue heart healthy diet and atorvastatin   4. Swelling  See below   5. Shortness of breath  BNP; see below   6. Hypertensive heart disease without heart failure  No recurrent angina pectoris or severe CHF on current activity schedule; continue current treatment with adjustment as outlined below            Plan:         1. Patient to continue current medications and close follow up with the above providers with the following alterations:   A. Add aldactone 12.5 mg daily   B. In 2 weeks, update us on symptoms, as well as obtain BNP/BMP/magnesium level  2. Tentative cardiology follow up in September 2020 with same-day echocardiogram, or patient may return sooner PRN.    Transcribed by Susan Christianson for Dr. Jeyson Land at 11:16 AM on 09/11/2019    IJeyson MD, [facility], personally performed the services described in this documentation as scribed by the above named individual in my presence, and it is both accurate and complete. At 11:22 AM on 09/11/2019

## 2019-09-11 ENCOUNTER — OFFICE VISIT (OUTPATIENT)
Dept: CARDIOLOGY | Facility: CLINIC | Age: 84
End: 2019-09-11

## 2019-09-11 VITALS
BODY MASS INDEX: 27.46 KG/M2 | WEIGHT: 155 LBS | HEART RATE: 61 BPM | HEIGHT: 63 IN | DIASTOLIC BLOOD PRESSURE: 64 MMHG | SYSTOLIC BLOOD PRESSURE: 138 MMHG

## 2019-09-11 DIAGNOSIS — R06.02 SHORTNESS OF BREATH: ICD-10-CM

## 2019-09-11 DIAGNOSIS — I10 BENIGN ESSENTIAL HTN: Primary | ICD-10-CM

## 2019-09-11 DIAGNOSIS — E78.5 DYSLIPIDEMIA: ICD-10-CM

## 2019-09-11 DIAGNOSIS — R00.2 INTERMITTENT PALPITATIONS: ICD-10-CM

## 2019-09-11 DIAGNOSIS — I11.9 HYPERTENSIVE HEART DISEASE WITHOUT HEART FAILURE: ICD-10-CM

## 2019-09-11 DIAGNOSIS — R60.9 SWELLING: ICD-10-CM

## 2019-09-11 PROCEDURE — 99214 OFFICE O/P EST MOD 30 MIN: CPT | Performed by: INTERNAL MEDICINE

## 2019-09-11 RX ORDER — SPIRONOLACTONE 25 MG/1
12.5 TABLET ORAL DAILY
Qty: 15 TABLET | Refills: 11 | Status: SHIPPED | OUTPATIENT
Start: 2019-09-11 | End: 2020-09-16

## 2019-09-11 RX ORDER — LISINOPRIL AND HYDROCHLOROTHIAZIDE 25; 20 MG/1; MG/1
1 TABLET ORAL DAILY
COMMUNITY
End: 2019-11-25

## 2019-09-11 RX ORDER — CHOLECALCIFEROL (VITAMIN D3) 125 MCG
500 CAPSULE ORAL DAILY
COMMUNITY

## 2019-10-12 LAB
BNP SERPL-MCNC: 69.5 PG/ML (ref 0–100)
BUN SERPL-MCNC: 12 MG/DL (ref 8–23)
BUN/CREAT SERPL: 18.2 (ref 7–25)
CALCIUM SERPL-MCNC: 9.4 MG/DL (ref 8.6–10.5)
CHLORIDE SERPL-SCNC: 104 MMOL/L (ref 98–107)
CO2 SERPL-SCNC: 27.1 MMOL/L (ref 22–29)
CREAT SERPL-MCNC: 0.66 MG/DL (ref 0.57–1)
GLUCOSE SERPL-MCNC: 96 MG/DL (ref 65–99)
MAGNESIUM SERPL-MCNC: 1.9 MG/DL (ref 1.6–2.4)
POTASSIUM SERPL-SCNC: 4.2 MMOL/L (ref 3.5–5.2)
SODIUM SERPL-SCNC: 141 MMOL/L (ref 136–145)

## 2019-10-23 ENCOUNTER — FLU SHOT (OUTPATIENT)
Dept: INTERNAL MEDICINE | Facility: CLINIC | Age: 84
End: 2019-10-23

## 2019-10-23 DIAGNOSIS — Z23 NEED FOR INFLUENZA VACCINATION: Primary | ICD-10-CM

## 2019-10-23 PROCEDURE — G0008 ADMIN INFLUENZA VIRUS VAC: HCPCS | Performed by: INTERNAL MEDICINE

## 2019-10-23 PROCEDURE — 90653 IIV ADJUVANT VACCINE IM: CPT | Performed by: INTERNAL MEDICINE

## 2019-10-28 ENCOUNTER — OFFICE VISIT (OUTPATIENT)
Dept: INTERNAL MEDICINE | Facility: CLINIC | Age: 84
End: 2019-10-28

## 2019-10-28 VITALS
OXYGEN SATURATION: 97 % | BODY MASS INDEX: 27.64 KG/M2 | HEART RATE: 71 BPM | TEMPERATURE: 99.1 F | SYSTOLIC BLOOD PRESSURE: 142 MMHG | HEIGHT: 63 IN | DIASTOLIC BLOOD PRESSURE: 66 MMHG | WEIGHT: 156 LBS

## 2019-10-28 DIAGNOSIS — R35.0 URINE FREQUENCY: Primary | ICD-10-CM

## 2019-10-28 LAB
BILIRUB BLD-MCNC: NEGATIVE MG/DL
CLARITY, POC: CLEAR
COLOR UR: YELLOW
GLUCOSE UR STRIP-MCNC: NEGATIVE MG/DL
KETONES UR QL: NEGATIVE
LEUKOCYTE EST, POC: ABNORMAL
NITRITE UR-MCNC: POSITIVE MG/ML
PH UR: 6 [PH] (ref 5–8)
PROT UR STRIP-MCNC: NEGATIVE MG/DL
RBC # UR STRIP: NEGATIVE /UL
SP GR UR: 1.01 (ref 1–1.03)
UROBILINOGEN UR QL: NORMAL

## 2019-10-28 PROCEDURE — 81003 URINALYSIS AUTO W/O SCOPE: CPT | Performed by: INTERNAL MEDICINE

## 2019-10-28 PROCEDURE — 99213 OFFICE O/P EST LOW 20 MIN: CPT | Performed by: INTERNAL MEDICINE

## 2019-10-28 RX ORDER — CIPROFLOXACIN 500 MG/1
500 TABLET, FILM COATED ORAL 2 TIMES DAILY
Qty: 12 TABLET | Refills: 0 | Status: SHIPPED | OUTPATIENT
Start: 2019-10-28 | End: 2019-11-25

## 2019-10-28 NOTE — PROGRESS NOTES
"Subjective  Sal Siu is a 84 y.o. female    HPI coming in with complains of dysuria and urinary frequency for about a week.  No associated fever or chills non-smoker    The following portions of the patient's history were reviewed and updated as appropriate: allergies, current medications, past family history, past medical history, past social history, past surgical history, and problem list.     Review of Systems   Constitutional: Positive for fatigue. Negative for fever.   Gastrointestinal: Negative for abdominal distention and abdominal pain.   Genitourinary: Positive for difficulty urinating, dysuria, frequency and urgency.       Visit Vitals  /66 Comment: Automatic   Pulse 71   Temp 99.1 °F (37.3 °C) (Temporal)   Ht 158.8 cm (62.5\")   Wt 70.8 kg (156 lb)   LMP  (LMP Unknown)   SpO2 97%   BMI 28.08 kg/m²       Objective  Physical Exam   Constitutional: She appears well-developed and well-nourished.   Abdominal: She exhibits no distension. There is no tenderness. There is no guarding.   Musculoskeletal: She exhibits no tenderness or deformity.   Vitals reviewed.      Diagnoses and all orders for this visit:    Urine frequency.  Urine analysis without evidence of infection.  Check culture.  Had similar symptoms about a year ago.  -     POC Urinalysis Dipstick, Automated  -     Urine Culture - Urine, Urine, Clean Catch    Other orders  -     ciprofloxacin (CIPRO) 500 MG tablet; Take 1 tablet by mouth 2 (Two) Times a Day.        "

## 2019-11-02 LAB
BACTERIA UR CULT: ABNORMAL
BACTERIA UR CULT: ABNORMAL
OTHER ANTIBIOTIC SUSC ISLT: ABNORMAL

## 2019-11-25 ENCOUNTER — OFFICE VISIT (OUTPATIENT)
Dept: INTERNAL MEDICINE | Facility: CLINIC | Age: 84
End: 2019-11-25

## 2019-11-25 VITALS
HEART RATE: 53 BPM | SYSTOLIC BLOOD PRESSURE: 134 MMHG | BODY MASS INDEX: 27.46 KG/M2 | WEIGHT: 155 LBS | OXYGEN SATURATION: 99 % | DIASTOLIC BLOOD PRESSURE: 64 MMHG | TEMPERATURE: 99.1 F | HEIGHT: 63 IN

## 2019-11-25 DIAGNOSIS — E78.5 DYSLIPIDEMIA: ICD-10-CM

## 2019-11-25 DIAGNOSIS — N39.41 URGE INCONTINENCE: ICD-10-CM

## 2019-11-25 DIAGNOSIS — I10 BENIGN ESSENTIAL HTN: Primary | ICD-10-CM

## 2019-11-25 DIAGNOSIS — I11.9 HYPERTENSIVE HEART DISEASE WITHOUT HEART FAILURE: ICD-10-CM

## 2019-11-25 PROCEDURE — 99214 OFFICE O/P EST MOD 30 MIN: CPT | Performed by: INTERNAL MEDICINE

## 2019-11-25 RX ORDER — TOLTERODINE 2 MG/1
2 CAPSULE, EXTENDED RELEASE ORAL DAILY
Qty: 30 CAPSULE | Refills: 2 | Status: SHIPPED | OUTPATIENT
Start: 2019-11-25 | End: 2019-12-18

## 2019-11-25 RX ORDER — LISINOPRIL 20 MG/1
20 TABLET ORAL DAILY
Refills: 3 | COMMUNITY
Start: 2019-10-28 | End: 2020-05-14 | Stop reason: SDUPTHER

## 2019-11-25 NOTE — PROGRESS NOTES
"Subjective  Sal Siu is a 84 y.o. female    HPI coming in for follow-up patient with chronic lower extremity edema has had reflux esophagitis, hypertension and dyslipidemia currently with complaints of urinary urgency    The following portions of the patient's history were reviewed and updated as appropriate: allergies, current medications, past family history, past medical history, past social history, past surgical history, and problem list.     Review of Systems   Constitutional: Negative.  Negative for activity change, appetite change, fatigue and fever.   HENT: Negative for congestion, ear discharge, ear pain and trouble swallowing.    Eyes: Negative for photophobia and visual disturbance.   Respiratory: Negative for cough and shortness of breath.    Cardiovascular: Positive for leg swelling. Negative for chest pain and palpitations.   Gastrointestinal: Negative for abdominal distention, abdominal pain, constipation, diarrhea, nausea and vomiting.   Endocrine: Negative.    Genitourinary: Positive for urgency. Negative for dysuria and hematuria.   Musculoskeletal: Positive for arthralgias. Negative for back pain, joint swelling and myalgias.   Skin: Negative for color change and rash.   Allergic/Immunologic: Negative.    Neurological: Negative for dizziness, weakness, light-headedness and headaches.   Hematological: Negative for adenopathy. Does not bruise/bleed easily.   Psychiatric/Behavioral: Negative for agitation, confusion and dysphoric mood. The patient is not nervous/anxious.        Visit Vitals  /64 Comment: Automatic   Pulse 53   Temp 99.1 °F (37.3 °C) (Temporal)   Ht 158.8 cm (62.5\")   Wt 70.3 kg (155 lb)   LMP  (LMP Unknown)   SpO2 99%   BMI 27.90 kg/m²       Objective  Physical Exam   Constitutional: She is oriented to person, place, and time. She appears well-developed and well-nourished. No distress.   HENT:   Nose: Nose normal.   Mouth/Throat: Oropharynx is clear and moist.   Eyes: " Conjunctivae and EOM are normal. No scleral icterus.   Neck: No tracheal deviation present. No thyromegaly present.   Cardiovascular: Normal rate and regular rhythm. Exam reveals no friction rub.   No murmur heard.  Pulmonary/Chest: No respiratory distress. She has no wheezes. She has no rales.   Abdominal: Soft. She exhibits no distension and no mass. There is no tenderness. There is no guarding.   Musculoskeletal: Normal range of motion. She exhibits no deformity.   Lymphadenopathy:     She has no cervical adenopathy.   Neurological: She is alert and oriented to person, place, and time. She has normal reflexes. No cranial nerve deficit. Coordination normal.   Skin: Skin is warm and dry. No rash noted. No erythema.   Psychiatric: She has a normal mood and affect. Her behavior is normal. Judgment and thought content normal.       Diagnoses and all orders for this visit:    Benign essential HTN stable with current meds and low-salt diet    Dyslipidemia continue the statins follow lipid profile    Hypertensive heart disease without heart failure stable with current meds    Urge incontinence urine analysis without evidence of infection trial of Detrol LA at nighttime    Other orders  -     lisinopril (PRINIVIL,ZESTRIL) 20 MG tablet; Take 20 mg by mouth Daily.

## 2019-12-17 ENCOUNTER — TELEPHONE (OUTPATIENT)
Dept: INTERNAL MEDICINE | Facility: CLINIC | Age: 84
End: 2019-12-17

## 2019-12-18 DIAGNOSIS — N39.41 URGE INCONTINENCE: Primary | ICD-10-CM

## 2019-12-18 DIAGNOSIS — R35.0 URINE FREQUENCY: ICD-10-CM

## 2020-05-15 RX ORDER — ATORVASTATIN CALCIUM 40 MG/1
TABLET, FILM COATED ORAL
Qty: 90 TABLET | Refills: 0 | Status: SHIPPED | OUTPATIENT
Start: 2020-05-15 | End: 2020-08-13

## 2020-05-15 RX ORDER — AMLODIPINE BESYLATE 5 MG/1
TABLET ORAL
Qty: 90 TABLET | Refills: 0 | Status: SHIPPED | OUTPATIENT
Start: 2020-05-15 | End: 2020-08-13

## 2020-05-18 RX ORDER — LISINOPRIL 20 MG/1
20 TABLET ORAL DAILY
Qty: 30 TABLET | Refills: 3 | Status: SHIPPED | OUTPATIENT
Start: 2020-05-18 | End: 2020-08-13

## 2020-06-16 DIAGNOSIS — R00.2 INTERMITTENT PALPITATIONS: Primary | ICD-10-CM

## 2020-06-26 RX ORDER — TORSEMIDE 10 MG/1
TABLET ORAL
Qty: 100 TABLET | Refills: 0 | Status: SHIPPED | OUTPATIENT
Start: 2020-06-26 | End: 2021-08-24

## 2020-07-29 ENCOUNTER — OFFICE VISIT (OUTPATIENT)
Dept: INTERNAL MEDICINE | Facility: CLINIC | Age: 85
End: 2020-07-29

## 2020-07-29 ENCOUNTER — HOSPITAL ENCOUNTER (OUTPATIENT)
Dept: CT IMAGING | Facility: HOSPITAL | Age: 85
Discharge: HOME | End: 2020-07-29
Admitting: NURSE PRACTITIONER

## 2020-07-29 VITALS
SYSTOLIC BLOOD PRESSURE: 150 MMHG | OXYGEN SATURATION: 98 % | HEIGHT: 62 IN | DIASTOLIC BLOOD PRESSURE: 78 MMHG | HEART RATE: 93 BPM | WEIGHT: 146 LBS | BODY MASS INDEX: 26.87 KG/M2 | TEMPERATURE: 98.5 F

## 2020-07-29 DIAGNOSIS — K57.92 DIVERTICULITIS: ICD-10-CM

## 2020-07-29 DIAGNOSIS — R10.30 LOWER ABDOMINAL PAIN: ICD-10-CM

## 2020-07-29 DIAGNOSIS — B35.1 ONYCHOMYCOSIS: ICD-10-CM

## 2020-07-29 DIAGNOSIS — R39.198 DIFFICULTY URINATING: ICD-10-CM

## 2020-07-29 DIAGNOSIS — R10.30 INGUINAL PAIN, UNSPECIFIED LATERALITY: Primary | ICD-10-CM

## 2020-07-29 LAB
BILIRUB BLD-MCNC: NEGATIVE MG/DL
CLARITY, POC: ABNORMAL
COLOR UR: YELLOW
GLUCOSE UR STRIP-MCNC: NEGATIVE MG/DL
KETONES UR QL: NEGATIVE
LEUKOCYTE EST, POC: ABNORMAL
NITRITE UR-MCNC: NEGATIVE MG/ML
PH UR: 6 [PH] (ref 5–8)
PROT UR STRIP-MCNC: ABNORMAL MG/DL
RBC # UR STRIP: NEGATIVE /UL
SP GR UR: 1.03 (ref 1–1.03)
UROBILINOGEN UR QL: NORMAL

## 2020-07-29 PROCEDURE — 74176 CT ABD & PELVIS W/O CONTRAST: CPT

## 2020-07-29 PROCEDURE — 81003 URINALYSIS AUTO W/O SCOPE: CPT | Performed by: NURSE PRACTITIONER

## 2020-07-29 PROCEDURE — 99214 OFFICE O/P EST MOD 30 MIN: CPT | Performed by: NURSE PRACTITIONER

## 2020-07-29 RX ORDER — AMOXICILLIN AND CLAVULANATE POTASSIUM 875; 125 MG/1; MG/1
1 TABLET, FILM COATED ORAL 2 TIMES DAILY
Qty: 20 TABLET | Refills: 0 | Status: SHIPPED | OUTPATIENT
Start: 2020-07-29 | End: 2020-08-08

## 2020-08-03 NOTE — TELEPHONE ENCOUNTER
PT IS REQUESTING A REFILL FOR     PT CALL BACK [phone]    lisinopril (PRINIVIL,ZESTRIL) 20 MG tablet  PT STATES IT ALSO SAYS ON THE BOTTLE HYDROCHLOROTHIAZIDE     PT HAS 1 TABLET LEFT      TRICIA CANTU         
70M presents to the ED s/p fall last night around 9pm. He says he had a few glasses of wine and while going up the steps with his glass, he lost his balance falling backward. + head injury, No LOC. His wife was present who bandaged him up. This morning, he awoke with b/l hand swelling and the bleeding from one wound continues. He also has right chest wall pain. Preceding the fall, no dizziness or chest pain. No palpitations.   He has PMH of afib, s/p ablation. He has h/o ablation. He also received his tetanus (with pertusis) because his daughter is expecting to deliver shortly. This was within the past week with [facility]. Other PMH include HTN, HLD, Cervical and Lumbar Laminectomy.

## 2020-08-04 ENCOUNTER — PRIOR AUTHORIZATION (OUTPATIENT)
Dept: INTERNAL MEDICINE | Facility: CLINIC | Age: 85
End: 2020-08-04

## 2020-08-04 NOTE — TELEPHONE ENCOUNTER
PA for Jublia 10% solution has been done via Cover My Meds     Waiting on response:    Maulik's covered (formulary) drugs are terbinafine HCl tablet, itraconazole capsule, griseofulvin ultramicrosize tablet AND ciclopirox topical solution. If a covered (formulary) drug is prescribed

## 2020-08-07 NOTE — TELEPHONE ENCOUNTER
PA denied     Preferred medications     Ciclopirox topical solution   Griseofulvin  ltramicrosize tablet  Itraconazole capsule  terbinafine tablet

## 2020-08-13 RX ORDER — ATORVASTATIN CALCIUM 40 MG/1
TABLET, FILM COATED ORAL
Qty: 90 TABLET | Refills: 3 | Status: SHIPPED | OUTPATIENT
Start: 2020-08-13 | End: 2021-10-13

## 2020-08-13 RX ORDER — OMEPRAZOLE 20 MG/1
CAPSULE, DELAYED RELEASE ORAL
Qty: 90 CAPSULE | Refills: 3 | Status: SHIPPED | OUTPATIENT
Start: 2020-08-13 | End: 2021-10-15 | Stop reason: SDUPTHER

## 2020-08-13 RX ORDER — LISINOPRIL 20 MG/1
TABLET ORAL
Qty: 90 TABLET | Refills: 3 | Status: SHIPPED | OUTPATIENT
Start: 2020-08-13 | End: 2021-03-25 | Stop reason: SDUPTHER

## 2020-08-13 RX ORDER — AMLODIPINE BESYLATE 5 MG/1
TABLET ORAL
Qty: 90 TABLET | Refills: 3 | Status: SHIPPED | OUTPATIENT
Start: 2020-08-13 | End: 2021-03-25 | Stop reason: SDUPTHER

## 2020-08-31 ENCOUNTER — OFFICE VISIT (OUTPATIENT)
Dept: INTERNAL MEDICINE | Facility: CLINIC | Age: 85
End: 2020-08-31

## 2020-08-31 VITALS
SYSTOLIC BLOOD PRESSURE: 128 MMHG | WEIGHT: 148.8 LBS | OXYGEN SATURATION: 99 % | HEART RATE: 73 BPM | BODY MASS INDEX: 27.38 KG/M2 | TEMPERATURE: 97.1 F | DIASTOLIC BLOOD PRESSURE: 76 MMHG | HEIGHT: 62 IN

## 2020-08-31 DIAGNOSIS — B35.1 ONYCHOMYCOSIS: ICD-10-CM

## 2020-08-31 DIAGNOSIS — Z09 FOLLOW UP: Primary | ICD-10-CM

## 2020-08-31 DIAGNOSIS — E78.5 DYSLIPIDEMIA: ICD-10-CM

## 2020-08-31 DIAGNOSIS — Z23 NEED FOR INFLUENZA VACCINATION: ICD-10-CM

## 2020-08-31 DIAGNOSIS — I10 BENIGN ESSENTIAL HTN: ICD-10-CM

## 2020-08-31 PROCEDURE — G0008 ADMIN INFLUENZA VIRUS VAC: HCPCS | Performed by: NURSE PRACTITIONER

## 2020-08-31 PROCEDURE — 99214 OFFICE O/P EST MOD 30 MIN: CPT | Performed by: NURSE PRACTITIONER

## 2020-08-31 PROCEDURE — 90653 IIV ADJUVANT VACCINE IM: CPT | Performed by: NURSE PRACTITIONER

## 2020-08-31 NOTE — PROGRESS NOTES
"Date: 2020    Name: Sal Siu  : 1935    Chief Complaint:   Chief Complaint   Patient presents with   • Diverticulitis     follow up       HPI:  Sal Siu is a 85 y.o. female presents for follow up of diverticulitis diagnosed and treated a month ago.  She has completed antibiotics as prescribed, no longer has lower abdominal/groin pain.  Denies fever, chills, belching, flatulence, change in appetite, n/v/d/c.    Nail fungus continues to worsen, more dark spots beneath nails, as well as increased yellow discoloration and thickening of fingernails.  Medication prescribed was not approved by insurance, cost > $100.  Patient has been seen by dermatology for other skin conditions in the past.    She takes amlodipine, atorvastatin, lisinopril, spironolactone and torsemide as prescribed for HTN, hyperlipids.  Does not often monitor BP at home.  Denies chest pain, dyspnea, orthopnea, palpitations, lower extremity edema, confusion, headaches, weakness, visual disturbances.    History: The following portions of the patient's history were reviewed and updated as appropriate: allergies, current medications, past medical history, family history, surgical history, social history and problem list.      ROS:  Review of Systems   Constitutional: Negative.    Respiratory: Negative.    Cardiovascular: Negative.    Gastrointestinal: Negative.    Neurological: Negative for dizziness.       VS:  Vitals:    20 0848   BP: 128/76   Pulse: 73   Temp: 97.1 °F (36.2 °C)   TempSrc: Temporal   SpO2: 99%   Weight: 67.5 kg (148 lb 12.8 oz)   Height: 157.5 cm (62\")     Body mass index is 27.22 kg/m².  PE:  Physical Exam   Constitutional: She is oriented to person, place, and time. She appears well-developed and well-nourished. No distress.   HENT:   Head: Normocephalic.   Right Ear: External ear normal.   Left Ear: External ear normal.   Eyes: Pupils are equal, round, and reactive to light. Conjunctivae are normal. "   Neck: Normal range of motion. Neck supple.   Cardiovascular: Normal rate, regular rhythm, normal heart sounds and intact distal pulses.   Pulmonary/Chest: Effort normal and breath sounds normal.   Abdominal: Soft. Bowel sounds are normal. She exhibits no distension. There is no tenderness.   Neurological: She is alert and oriented to person, place, and time.   Skin: Skin is warm. Capillary refill takes less than 2 seconds.   Yellow, thick fingernails.  Dark spots beneath right thumbnail.         Assessment/Plan:  Sal was seen today for diverticulitis.    Diagnoses and all orders for this visit:    Follow up        - After completion of Augmentin prescribed for diverticulitis, symptoms have improved.  Return to clinic if abdominal/groin pain, n/v/d return.     Need for influenza vaccination  -     Fluad Quad >65 years    Benign essential HTN  Dyslipidemia        - Follow heart healthy diet.  Advised to keep daily sodium intake to < 1500 mg per day.  Avoid processed & fast foods        - Exercise as tolerated, with a goal of 30 minutes of moderate exercise most days.         - Take medications as prescribed.    Onychomycosis  -     ciclopirox (LOPROX) 0.77 % cream; Apply  topically to the appropriate area as directed Every Night. Clean nail with alcohol for 7 days prior to application. Choice of medication based on denial letter from insurance, brought by patient to clinic.  - Will return to clinic or go to dermatologist if symptoms persist or worsen after using treatment for at least 2 weeks.      Return for Next scheduled follow up.  Scheduled for annual Medicare Wellness exam on 9/25/20 with Dr Barrios.

## 2020-09-09 ENCOUNTER — HOSPITAL ENCOUNTER (OUTPATIENT)
Dept: CARDIOLOGY | Facility: HOSPITAL | Age: 85
Discharge: HOME | End: 2020-09-09
Admitting: INTERNAL MEDICINE

## 2020-09-09 DIAGNOSIS — R00.2 INTERMITTENT PALPITATIONS: ICD-10-CM

## 2020-09-09 LAB
ASCENDING AORTA: 2.8 CM
BH CV ECHO MEAS - AO MAX PG (FULL): 3.4 MMHG
BH CV ECHO MEAS - AO MAX PG: 14 MMHG
BH CV ECHO MEAS - AO MEAN PG (FULL): 2 MMHG
BH CV ECHO MEAS - AO MEAN PG: 6 MMHG
BH CV ECHO MEAS - AO ROOT AREA (BSA CORRECTED): 1.7
BH CV ECHO MEAS - AO ROOT AREA: 6.2 CM^2
BH CV ECHO MEAS - AO ROOT DIAM: 2.8 CM
BH CV ECHO MEAS - AO V2 MAX: 187 CM/SEC
BH CV ECHO MEAS - AO V2 MEAN: 119 CM/SEC
BH CV ECHO MEAS - AO V2 VTI: 40.9 CM
BH CV ECHO MEAS - ASC AORTA: 2.8 CM
BH CV ECHO MEAS - AVA(I,A): 2 CM^2
BH CV ECHO MEAS - AVA(I,D): 2 CM^2
BH CV ECHO MEAS - AVA(V,A): 2.2 CM^2
BH CV ECHO MEAS - AVA(V,D): 2.2 CM^2
BH CV ECHO MEAS - BSA(HAYCOCK): 1.7 M^2
BH CV ECHO MEAS - BSA: 1.7 M^2
BH CV ECHO MEAS - BZI_BMI: 26.5 KILOGRAMS/M^2
BH CV ECHO MEAS - BZI_METRIC_HEIGHT: 157.5 CM
BH CV ECHO MEAS - BZI_METRIC_WEIGHT: 65.8 KG
BH CV ECHO MEAS - EDV(CUBED): 87.5 ML
BH CV ECHO MEAS - EDV(MOD-SP2): 49 ML
BH CV ECHO MEAS - EDV(MOD-SP4): 60 ML
BH CV ECHO MEAS - EDV(TEICH): 89.6 ML
BH CV ECHO MEAS - EF(CUBED): 68.8 %
BH CV ECHO MEAS - EF(MOD-BP): 67 %
BH CV ECHO MEAS - EF(MOD-SP2): 65.3 %
BH CV ECHO MEAS - EF(MOD-SP4): 66.7 %
BH CV ECHO MEAS - EF(TEICH): 60.6 %
BH CV ECHO MEAS - ESV(CUBED): 27.3 ML
BH CV ECHO MEAS - ESV(MOD-SP2): 17 ML
BH CV ECHO MEAS - ESV(MOD-SP4): 20 ML
BH CV ECHO MEAS - ESV(TEICH): 35.3 ML
BH CV ECHO MEAS - FS: 32.2 %
BH CV ECHO MEAS - IVS/LVPW: 1.5
BH CV ECHO MEAS - IVSD: 0.9 CM
BH CV ECHO MEAS - LAD MAJOR: 4.4 CM
BH CV ECHO MEAS - LAT PEAK E' VEL: 6.5 CM/SEC
BH CV ECHO MEAS - LATERAL E/E' RATIO: 14.8
BH CV ECHO MEAS - LV DIASTOLIC VOL/BSA (35-75): 36 ML/M^2
BH CV ECHO MEAS - LV MASS(C)D: 96.3 GRAMS
BH CV ECHO MEAS - LV MASS(C)DI: 57.8 GRAMS/M^2
BH CV ECHO MEAS - LV MAX PG: 10.6 MMHG
BH CV ECHO MEAS - LV MEAN PG: 4 MMHG
BH CV ECHO MEAS - LV SYSTOLIC VOL/BSA (12-30): 12 ML/M^2
BH CV ECHO MEAS - LV V1 MAX: 163 CM/SEC
BH CV ECHO MEAS - LV V1 MEAN: 93.6 CM/SEC
BH CV ECHO MEAS - LV V1 VTI: 32.7 CM
BH CV ECHO MEAS - LVIDD: 4.4 CM
BH CV ECHO MEAS - LVIDS: 2.7 CM
BH CV ECHO MEAS - LVLD AP2: 5.9 CM
BH CV ECHO MEAS - LVLD AP4: 5.9 CM
BH CV ECHO MEAS - LVLS AP2: 4.3 CM
BH CV ECHO MEAS - LVLS AP4: 5.3 CM
BH CV ECHO MEAS - LVOT AREA (M): 2.5 CM^2
BH CV ECHO MEAS - LVOT AREA: 2.5 CM^2
BH CV ECHO MEAS - LVOT DIAM: 1.8 CM
BH CV ECHO MEAS - LVPWD: 0.9 CM
BH CV ECHO MEAS - MED PEAK E' VEL: 5.4 CM/SEC
BH CV ECHO MEAS - MEDIAL E/E' RATIO: 17.9
BH CV ECHO MEAS - MV A MAX VEL: 143 CM/SEC
BH CV ECHO MEAS - MV DEC TIME: 0.34 SEC
BH CV ECHO MEAS - MV E MAX VEL: 96.3 CM/SEC
BH CV ECHO MEAS - MV E/A: 0.67
BH CV ECHO MEAS - PA ACC SLOPE: 748 CM/SEC^2
BH CV ECHO MEAS - PA ACC TIME: 0.12 SEC
BH CV ECHO MEAS - PA PR(ACCEL): 23.2 MMHG
BH CV ECHO MEAS - PI END-D VEL: 95.6 CM/SEC
BH CV ECHO MEAS - RAP SYSTOLE: 3 MMHG
BH CV ECHO MEAS - RVSP: 28.6 MMHG
BH CV ECHO MEAS - SI(AO): 151 ML/M^2
BH CV ECHO MEAS - SI(CUBED): 36.1 ML/M^2
BH CV ECHO MEAS - SI(LVOT): 49.9 ML/M^2
BH CV ECHO MEAS - SI(MOD-SP2): 19.2 ML/M^2
BH CV ECHO MEAS - SI(MOD-SP4): 24 ML/M^2
BH CV ECHO MEAS - SI(TEICH): 32.6 ML/M^2
BH CV ECHO MEAS - SV(AO): 251.8 ML
BH CV ECHO MEAS - SV(CUBED): 60.3 ML
BH CV ECHO MEAS - SV(LVOT): 83.2 ML
BH CV ECHO MEAS - SV(MOD-SP2): 32 ML
BH CV ECHO MEAS - SV(MOD-SP4): 40 ML
BH CV ECHO MEAS - SV(TEICH): 54.3 ML
BH CV ECHO MEAS - TAPSE (>1.6): 1.9 CM2
BH CV ECHO MEAS - TR MAX PG: 25.6 MMHG
BH CV ECHO MEAS - TR MAX VEL: 253 CM/SEC
BH CV ECHO MEASUREMENTS AVERAGE E/E' RATIO: 16.18
BH CV XLRA - RV BASE: 2.9 CM
BH CV XLRA - RV LENGTH: 4.5 CM
BH CV XLRA - RV MID: 2.3 CM
LEFT ATRIUM VOLUME INDEX: 24 ML/M^2
LEFT ATRIUM VOLUME: 40 ML
LV EF 2D ECHO EST: 71 %

## 2020-09-09 PROCEDURE — 93306 TTE W/DOPPLER COMPLETE: CPT | Performed by: INTERNAL MEDICINE

## 2020-09-09 PROCEDURE — 93306 TTE W/DOPPLER COMPLETE: CPT

## 2020-09-15 NOTE — PROGRESS NOTES
Subjective:     Encounter Date:09/16/2020    Patient ID: Sal Siu is a 85 y.o.  white female, retired /supervisor, from [place], [place].     REFERRING PHYSICIAN/INTERNIST: Raj Nugent MD  CURRENT INTERNIST:  David Barrios MD  PREVIOUS NEUROSURGEON: Derek Avery MD   NEUROSURGEON: Arron Gonzalez MD  OPHTHALMOLOGIST: Meseret Bentley MD  SLEEP PHYSICIAN: Raj Benito MD, [facility]    Chief Complaint:   Chief Complaint   Patient presents with   • Hypertension       Problem List:  1. Chronic hypertension, probably essential.  2. Hypertensive cardiovascular disease:  a. Remote progressive CCS class II-III chest pain syndrome/normal EKG and acceptable remote chest x-ray, November 2006.  b. Acceptable quantitative SPECT gated Cardiolite GXT with reduced exercise capacity, acceptable LVEF (0.80), March 2007.   c. Residual CCS class I angina pectoris with NYHA Class II exertional dyspnea and fatigue syndrome with acceptable echocardiogram, June 2012.  d. Residual CCS class 0 angina pectoris/NYHA class II exertional dyspnea and fatigue with persistent pedal edema, September 2019  e. Residual class I symptoms with abnormal acceptable echocardiogram, September 2020  3. Intermittent palpitations.   4. Dyslipidemia.   5. Elevated random serum glucose level, probable type 2 prediabetes mellitus, April 2008.  6. Borderline obesity.   7. Cataract, right eye.  8. Right infrascapular arthritis type pain with corticosteroid injection (data deficit), February 2009.  9. 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  10. Remote operations:  a. Appendectomy, 1946.  b. Lower back apparent lipoma removal (data deficit).   c. Dilation and curettage, 1970.  d. Hysterectomy, 1971.  e. Lumbar disc surgery, 1997.  f. Laparoscopic cholecystectomy (data deficit).   g. Posterior capsular opacification, left eye, May 2018    Allergies   Allergen Reactions   • Sulfa  Antibiotics Swelling     urticaria   • Other Nausea And Vomiting     MUSHROOMS-CAUSE 'HALLUCINATIONS'       Current Outpatient Medications:   •  amLODIPine (NORVASC) 5 MG tablet, Take 1 tablet by mouth once daily, Disp: 90 tablet, Rfl: 3  •  Ascorbic Acid (VITAMIN C PO), Take  by mouth Daily., Disp: , Rfl:   •  atorvastatin (LIPITOR) 40 MG tablet, Take 1 tablet by mouth once daily, Disp: 90 tablet, Rfl: 3  •  Cholecalciferol (VITAMIN D3) 5000 units capsule capsule, Take 1 capsule by mouth Daily., Disp: 30 capsule, Rfl: 6  •  ciclopirox (LOPROX) 0.77 % cream, Apply  topically to the appropriate area as directed Every Night. Clean nail with alcohol for 7 days prior to application, Disp: 90 g, Rfl: 2  •  Cyanocobalamin (VITAMIN B-12 PO), Take  by mouth Daily., Disp: , Rfl:   •  lisinopril (PRINIVIL,ZESTRIL) 20 MG tablet, Take 1 tablet by mouth once daily, Disp: 90 tablet, Rfl: 3  •  meclizine (ANTIVERT) 12.5 MG tablet, Take 12.5 mg by mouth 3 (Three) Times a Day As Needed for dizziness., Disp: , Rfl:   •  omeprazole (priLOSEC) 20 MG capsule, Take 1 capsule by mouth once daily, Disp: 90 capsule, Rfl: 3  •  torsemide (DEMADEX) 10 MG tablet, TAKE 1 TABLET BY MOUTH ONCE DAILY.  MAY  TAKE  AN  ADDITIONAL  10  MG  TABLET  AS  NEEDED  NO  MORE  THAN  ONCE  A  WEEK., Disp: 100 tablet, Rfl: 0    History of Present Illness: Patient returns for scheduled annual follow up. Since last visit, patient has been doing well overall and has been sheltering in place. She states her  has not been doing well. She reports she has a blood pressure machine at home but admits she does not check this at home. She is encouraged to do so. She says she was diagnosed with diverticulitis in July 2020 that was resolved with antibiotics and has a follow up appointment next month, October 2020. She has otherwise had no interm ER visits, hospitalizations, serious illnesses, or surgeries. She is active with daily activities and is without chest  "pain, pressure, or shortness of breath. She has already received the influenza immunizations. She states she \"does not want to\" but she and her  have to switch physicians to be seen in [place] as she is \"too old to be driving in [place]\". Patient otherwise denies chest pain, shortness of breath, PND, edema, palpitations, syncope, or presyncope at this time.       ROS   Obtained and negative except as outlined in problem list and HPI.    Procedures       Objective:       Vitals:    09/16/20 1054 09/16/20 1056   BP: 167/87 155/59   BP Location: Right arm Right arm   Patient Position: Sitting Standing   Pulse: 76 76   Temp: 96.8 °F (36 °C)    Weight: 66.2 kg (146 lb)    Height: 157.5 cm (62\")      Body mass index is 26.7 kg/m².  Last weight: 155 lbs    Vitals signs reviewed.   Constitutional:       Appearance: Well-developed.   Neck:      Thyroid: No thyromegaly.      Vascular: No carotid bruit or JVD.      Lymphadenopathy: No cervical adenopathy.   Pulmonary:      Effort: Pulmonary effort is normal.      Breath sounds: Normal breath sounds. No wheezing. No rhonchi. No rales.   Cardiovascular:      Regular rhythm.      No gallop. No S3 gallop.   Pulses:     Dorsalis pedis: 2+ bilaterally.     Posterior tibial: 2+ bilaterally.  Edema:     Peripheral edema absent.   Abdominal:      Palpations: Abdomen is soft. There is no abdominal mass.      Tenderness: There is no abdominal tenderness. There is no guarding.   Musculoskeletal: Normal range of motion.   Skin:     General: Skin is warm and dry.      Findings: No rash.   Neurological:      Mental Status: Alert and oriented to person, place, and time.           Lab Review: No recent lab results available for review.     Lab Results   Component Value Date    BUN 12 10/11/2019    CREATININE 0.66 10/11/2019    EGFRIFNONA 85 10/11/2019    EGFRIFAFRI 103 10/11/2019    BCR 18.2 10/11/2019     10/11/2019    K 4.2 10/11/2019     10/11/2019    MG 1.9 10/11/2019 "    CO2 27.1 10/11/2019    CALCIUM 9.4 10/11/2019    PROTENTOTREF 6.9 02/25/2019    ALBUMIN 4.10 02/25/2019    LABIL2 1.5 02/25/2019    AST 18 02/25/2019    ALT 41 02/25/2019       Lab Results   Component Value Date    WBC 6.03 02/25/2019    HGB 12.7 02/25/2019    HCT 38.8 02/25/2019    MCV 91.9 02/25/2019     02/25/2019       Lab Results   Component Value Date    TSH 4.510 02/25/2019       Lab Results   Component Value Date    CHLPL 141 02/25/2019     Lab Results   Component Value Date    TRIG 152 (H) 02/25/2019     Lab Results   Component Value Date    HDL 58 02/25/2019     Lab Results   Component Value Date    LDL 53 02/25/2019     CT abdomen, 07/29/2020:  IMPRESSION:  1. Findings are suggestive of mild localized sigmoid diverticulitis.  Infiltrating neoplasia thought unlikely but follow-up recommended to  ensure resolution.  2. Probable trace distal right-sided iliopsoas bursitis.  3. Low-density right renal lesion favored to represent a cyst but could  be followed or further characterized with adrenal mass protocol exam.  4. Other chronic appearing findings.    Echocardiogram, 09/09/2020:  · Mild tricuspid valve regurgitation is present.  · Estimated EF = 71%.  · Left ventricular systolic function is hyperdynamic (EF > 70).  · Left ventricular diastolic dysfunction (grade I) consistent with impaired relaxation.  · There is mild calcification of the aortic valve mainly affecting the non coronary cusp(s).  · Normal right ventricular cavity size, wall thickness, systolic function and septal motion noted.  · The aortic valve exhibits mild-moderate sclerosis.  · Moderate MAC is present.  · No evidence of pulmonary hypertension is present.  · There is no evidence of pericardial effusion.      Assessment:       Overall continued acceptable course with no new interim cardiopulmonary complaints with acceptable functional status. We will defer additional diagnostic or therapeutic intervention from a cardiac  perspective at this time. We have encouraged her to assess her blood pressure 1-2 times daily and to follow up with Dr. Barrios; if her systolic blood pressure is consistently at or above 140 torr, would increase her amlodipine to 10 mg daily.      Diagnosis Plan   1. Benign essential HTN  Suboptimal control; Continue current treatment and monitor blood pressure and follow up with Dr. Barrios    2. Intermittent palpitations  No documented recurrence; Continue current treatment.    3. Dyslipidemia  No new data to review; continue atorvastatin   4. Hypertensive heart disease without heart failure  No recurrent angina pectoris or CHF on current activity schedule; continue current treatment           Plan:         1. Patient to continue current medications and close follow up with the above providers.  2. Tentative cardiology follow up in September 2021 with Dr. Beaulieu in [place], [place] or patient may return sooner PRN.    Scribed for Jeyson Land MD by Mikala Maoy. 9/16/2020  11:20 EDT     I, Jeyson Land MD, [facility], personally performed the services described in this documentation as scribed by the above named individual in my presence, and it is both accurate and complete. At 11:21 EDT on 09/16/2020

## 2020-09-16 ENCOUNTER — OFFICE VISIT (OUTPATIENT)
Dept: CARDIOLOGY | Facility: CLINIC | Age: 85
End: 2020-09-16

## 2020-09-16 VITALS
HEIGHT: 62 IN | TEMPERATURE: 96.8 F | DIASTOLIC BLOOD PRESSURE: 59 MMHG | WEIGHT: 146 LBS | SYSTOLIC BLOOD PRESSURE: 155 MMHG | HEART RATE: 76 BPM | BODY MASS INDEX: 26.87 KG/M2

## 2020-09-16 DIAGNOSIS — E78.5 DYSLIPIDEMIA: ICD-10-CM

## 2020-09-16 DIAGNOSIS — I10 BENIGN ESSENTIAL HTN: Primary | ICD-10-CM

## 2020-09-16 DIAGNOSIS — I11.9 HYPERTENSIVE HEART DISEASE WITHOUT HEART FAILURE: ICD-10-CM

## 2020-09-16 DIAGNOSIS — R00.2 INTERMITTENT PALPITATIONS: ICD-10-CM

## 2020-09-16 PROCEDURE — 99214 OFFICE O/P EST MOD 30 MIN: CPT | Performed by: INTERNAL MEDICINE

## 2020-09-25 ENCOUNTER — OFFICE VISIT (OUTPATIENT)
Dept: INTERNAL MEDICINE | Facility: CLINIC | Age: 85
End: 2020-09-25

## 2020-09-25 VITALS
WEIGHT: 145.8 LBS | SYSTOLIC BLOOD PRESSURE: 130 MMHG | OXYGEN SATURATION: 98 % | TEMPERATURE: 98.7 F | HEART RATE: 78 BPM | DIASTOLIC BLOOD PRESSURE: 70 MMHG | BODY MASS INDEX: 26.83 KG/M2 | HEIGHT: 62 IN

## 2020-09-25 DIAGNOSIS — E78.5 DYSLIPIDEMIA: ICD-10-CM

## 2020-09-25 DIAGNOSIS — I10 BENIGN ESSENTIAL HTN: Primary | ICD-10-CM

## 2020-09-25 DIAGNOSIS — K21.9 GASTROESOPHAGEAL REFLUX DISEASE, ESOPHAGITIS PRESENCE NOT SPECIFIED: ICD-10-CM

## 2020-09-25 PROBLEM — R60.9 SWELLING: Status: RESOLVED | Noted: 2019-09-11 | Resolved: 2020-09-25

## 2020-09-25 PROCEDURE — G0439 PPPS, SUBSEQ VISIT: HCPCS | Performed by: INTERNAL MEDICINE

## 2020-09-25 NOTE — PROGRESS NOTES
The ABCs of the Annual Wellness Visit  Subsequent Medicare Wellness Visit    Chief Complaint   Patient presents with   • Medicare Wellness-subsequent       Subjective   History of Present Illness:  Sal Siu is a 85 y.o. female who presents for a Subsequent Medicare Wellness Visit.    HEALTH RISK ASSESSMENT    Recent Hospitalizations:  No hospitalization(s) within the last year.    Current Medical Providers:  Patient Care Team:  David Barrios MD as PCP - General (Internal Medicine)  David Barrios MD as PCP - Claims Attributed  Jeyson Land MD as Consulting Physician (Cardiology)    Smoking Status:  Social History     Tobacco Use   Smoking Status Never Smoker   Smokeless Tobacco Never Used       Alcohol Consumption:  Social History     Substance and Sexual Activity   Alcohol Use No       Depression Screen:   PHQ-2/PHQ-9 Depression Screening 7/29/2020   Little interest or pleasure in doing things 3   Feeling down, depressed, or hopeless 0   Trouble falling or staying asleep, or sleeping too much 2   Feeling tired or having little energy 3   Poor appetite or overeating 0   Feeling bad about yourself - or that you are a failure or have let yourself or your family down 0   Trouble concentrating on things, such as reading the newspaper or watching television 0   Moving or speaking so slowly that other people could have noticed. Or the opposite - being so fidgety or restless that you have been moving around a lot more than usual 0   Thoughts that you would be better off dead, or of hurting yourself in some way 0   Total Score 8       Fall Risk Screen:  STEADI Fall Risk Assessment was completed, and patient is at LOW risk for falls.Assessment completed on:7/29/2020    Health Habits and Functional and Cognitive Screening:  Functional & Cognitive Status 9/25/2020   Do you have difficulty preparing food and eating? No   Do you have difficulty bathing yourself, getting dressed or grooming yourself? No   Do you  have difficulty using the toilet? No   Do you have difficulty moving around from place to place? No   Do you have trouble with steps or getting out of a bed or a chair? No   Current Diet Well Balanced Diet   Dental Exam Up to date   Eye Exam Up to date   Exercise (times per week) -   Current Exercise Activities Include No Regular Exercise   Do you need help using the phone?  No   Are you deaf or do you have serious difficulty hearing?  No   Do you need help with transportation? No   Do you need help shopping? No   Do you need help preparing meals?  No   Do you need help with housework?  No   Do you need help with laundry? No   Do you need help taking your medications? No   Do you need help managing money? No   Do you ever drive or ride in a car without wearing a seat belt? No   Have you felt unusual stress, anger or loneliness in the last month? No   Who do you live with? Spouse   If you need help, do you have trouble finding someone available to you? No   Have you been bothered in the last four weeks by sexual problems? No   Do you have difficulty concentrating, remembering or making decisions? Yes         Does the patient have evidence of cognitive impairment? No    Asprin use counseling:Does not need ASA (and currently is not on it)    Age-appropriate Screening Schedule:  Refer to the list below for future screening recommendations based on patient's age, sex and/or medical conditions. Orders for these recommended tests are listed in the plan section. The patient has been provided with a written plan.    Health Maintenance   Topic Date Due   • ZOSTER VACCINE (1 of 2) 07/12/1985   • LIPID PANEL  02/25/2020   • TDAP/TD VACCINES (2 - Td) 12/17/2023   • INFLUENZA VACCINE  Completed          The following portions of the patient's history were reviewed and updated as appropriate: allergies, current medications, past family history, past medical history, past social history, past surgical history and problem  list.    Outpatient Medications Prior to Visit   Medication Sig Dispense Refill   • amLODIPine (NORVASC) 5 MG tablet Take 1 tablet by mouth once daily 90 tablet 3   • Ascorbic Acid (VITAMIN C PO) Take  by mouth Daily.     • atorvastatin (LIPITOR) 40 MG tablet Take 1 tablet by mouth once daily 90 tablet 3   • Cholecalciferol (VITAMIN D3) 5000 units capsule capsule Take 1 capsule by mouth Daily. 30 capsule 6   • ciclopirox (LOPROX) 0.77 % cream Apply  topically to the appropriate area as directed Every Night. Clean nail with alcohol for 7 days prior to application 90 g 2   • Cyanocobalamin (VITAMIN B-12 PO) Take  by mouth Daily.     • lisinopril (PRINIVIL,ZESTRIL) 20 MG tablet Take 1 tablet by mouth once daily 90 tablet 3   • meclizine (ANTIVERT) 12.5 MG tablet Take 12.5 mg by mouth 3 (Three) Times a Day As Needed for dizziness.     • omeprazole (priLOSEC) 20 MG capsule Take 1 capsule by mouth once daily 90 capsule 3   • torsemide (DEMADEX) 10 MG tablet TAKE 1 TABLET BY MOUTH ONCE DAILY.  MAY  TAKE  AN  ADDITIONAL  10  MG  TABLET  AS  NEEDED  NO  MORE  THAN  ONCE  A  WEEK. 100 tablet 0     No facility-administered medications prior to visit.        Patient Active Problem List   Diagnosis   • Benign essential HTN   • Hypertensive heart disease without heart failure   • Intermittent palpitations   • Dyslipidemia   • Shortness of breath       Advanced Care Planning:  ACP discussion was held with the patient during this visit. Patient has an advance directive (not in EMR), copy requested.    Review of Systems   Constitutional: Negative.  Negative for activity change, appetite change, fatigue and fever.   HENT: Negative for congestion, ear discharge, ear pain and trouble swallowing.    Eyes: Negative for photophobia and visual disturbance.   Respiratory: Negative for cough and shortness of breath.    Cardiovascular: Negative for chest pain and palpitations.   Gastrointestinal: Negative for abdominal distention, abdominal  "pain, constipation, diarrhea, nausea and vomiting.   Endocrine: Negative.    Genitourinary: Negative for dysuria, hematuria and urgency.   Musculoskeletal: Positive for arthralgias. Negative for back pain, joint swelling and myalgias.   Skin: Negative for color change and rash.   Allergic/Immunologic: Negative.    Neurological: Negative for dizziness, weakness, light-headedness and headaches.   Hematological: Negative for adenopathy. Does not bruise/bleed easily.   Psychiatric/Behavioral: Negative for agitation, confusion and dysphoric mood. The patient is not nervous/anxious.        Compared to one year ago, the patient feels her physical health is the same.  Compared to one year ago, the patient feels her mental health is the same.    Reviewed chart for potential of high risk medication in the elderly: yes  Reviewed chart for potential of harmful drug interactions in the elderly:yes    Objective         Vitals:    09/25/20 1354   BP: 130/70   Pulse: 78   Temp: 98.7 °F (37.1 °C)   TempSrc: Temporal   SpO2: 98%   Weight: 66.1 kg (145 lb 12.8 oz)   Height: 157.5 cm (62\")   PainSc: 0-No pain       Body mass index is 26.67 kg/m².  Discussed the patient's BMI with her. The BMI is in the acceptable range.    Physical Exam  Constitutional:       General: She is not in acute distress.     Appearance: She is well-developed.   HENT:      Nose: Nose normal.   Eyes:      General: No scleral icterus.     Conjunctiva/sclera: Conjunctivae normal.   Neck:      Thyroid: No thyromegaly.      Trachea: No tracheal deviation.   Cardiovascular:      Rate and Rhythm: Normal rate and regular rhythm.      Heart sounds: No murmur. No friction rub.   Pulmonary:      Effort: No respiratory distress.      Breath sounds: No wheezing or rales.   Abdominal:      General: There is no distension.      Palpations: Abdomen is soft. There is no mass.      Tenderness: There is no abdominal tenderness. There is no guarding.   Musculoskeletal: Normal " range of motion.         General: Deformity present.   Lymphadenopathy:      Cervical: No cervical adenopathy.   Skin:     General: Skin is warm and dry.      Findings: No erythema or rash.   Neurological:      Mental Status: She is alert and oriented to person, place, and time.      Cranial Nerves: No cranial nerve deficit.      Coordination: Coordination normal.      Deep Tendon Reflexes: Reflexes are normal and symmetric.   Psychiatric:         Behavior: Behavior normal.         Thought Content: Thought content normal.         Judgment: Judgment normal.               Assessment/Plan   Medicare Risks and Personalized Health Plan  CMS Preventative Services Quick Reference  Advance Directive Discussion  Polypharmacy  Urinary Incontinence    The above risks/problems have been discussed with the patient.  Pertinent information has been shared with the patient in the After Visit Summary.  Follow up plans and orders are seen below in the Assessment/Plan Section.    Diagnoses and all orders for this visit:    1. Benign essential HTN (Primary) stable with current meds and low-salt diet continue with cardiovascular exercise    2. Dyslipidemia continue with the dietary restrictions and the statins    3. Gastroesophageal reflux disease, esophagitis presence not specified stable on reflux precautions and omeprazole as needed      Follow Up:  No follow-ups on file.     An After Visit Summary and PPPS were given to the patient.

## 2020-09-26 LAB
ALBUMIN SERPL-MCNC: 4.7 G/DL (ref 3.5–5.2)
ALBUMIN/GLOB SERPL: 2.5 G/DL
ALP SERPL-CCNC: 112 U/L (ref 39–117)
ALT SERPL-CCNC: 19 U/L (ref 1–33)
AST SERPL-CCNC: 13 U/L (ref 1–32)
BILIRUB SERPL-MCNC: 0.7 MG/DL (ref 0–1.2)
BUN SERPL-MCNC: 14 MG/DL (ref 8–23)
BUN/CREAT SERPL: 19.7 (ref 7–25)
CALCIUM SERPL-MCNC: 9.5 MG/DL (ref 8.6–10.5)
CHLORIDE SERPL-SCNC: 101 MMOL/L (ref 98–107)
CO2 SERPL-SCNC: 25.3 MMOL/L (ref 22–29)
CREAT SERPL-MCNC: 0.71 MG/DL (ref 0.57–1)
ERYTHROCYTE [DISTWIDTH] IN BLOOD BY AUTOMATED COUNT: 12 % (ref 12.3–15.4)
GLOBULIN SER CALC-MCNC: 1.9 GM/DL
GLUCOSE SERPL-MCNC: 100 MG/DL (ref 65–99)
HCT VFR BLD AUTO: 37.4 % (ref 34–46.6)
HGB BLD-MCNC: 12.7 G/DL (ref 12–15.9)
LDLC SERPL DIRECT ASSAY-MCNC: 47 MG/DL (ref 0–99)
MCH RBC QN AUTO: 30.5 PG (ref 26.6–33)
MCHC RBC AUTO-ENTMCNC: 34 G/DL (ref 31.5–35.7)
MCV RBC AUTO: 89.9 FL (ref 79–97)
PLATELET # BLD AUTO: 195 10*3/MM3 (ref 140–450)
POTASSIUM SERPL-SCNC: 4.6 MMOL/L (ref 3.5–5.2)
PROT SERPL-MCNC: 6.6 G/DL (ref 6–8.5)
RBC # BLD AUTO: 4.16 10*6/MM3 (ref 3.77–5.28)
SODIUM SERPL-SCNC: 137 MMOL/L (ref 136–145)
WBC # BLD AUTO: 5.5 10*3/MM3 (ref 3.4–10.8)

## 2021-03-25 ENCOUNTER — OFFICE VISIT (OUTPATIENT)
Dept: INTERNAL MEDICINE | Facility: CLINIC | Age: 86
End: 2021-03-25

## 2021-03-25 VITALS
WEIGHT: 150 LBS | TEMPERATURE: 98.2 F | HEIGHT: 62 IN | BODY MASS INDEX: 27.6 KG/M2 | SYSTOLIC BLOOD PRESSURE: 130 MMHG | OXYGEN SATURATION: 98 % | DIASTOLIC BLOOD PRESSURE: 70 MMHG | HEART RATE: 66 BPM

## 2021-03-25 DIAGNOSIS — I10 BENIGN ESSENTIAL HTN: Primary | ICD-10-CM

## 2021-03-25 DIAGNOSIS — M79.18 PAIN IN RIGHT BUTTOCK: ICD-10-CM

## 2021-03-25 DIAGNOSIS — E78.2 MIXED HYPERLIPIDEMIA: ICD-10-CM

## 2021-03-25 DIAGNOSIS — M79.89 LEG SWELLING: ICD-10-CM

## 2021-03-25 PROBLEM — R06.02 SHORTNESS OF BREATH: Status: RESOLVED | Noted: 2017-09-08 | Resolved: 2021-03-25

## 2021-03-25 PROCEDURE — 99214 OFFICE O/P EST MOD 30 MIN: CPT | Performed by: INTERNAL MEDICINE

## 2021-03-25 RX ORDER — LISINOPRIL 40 MG/1
40 TABLET ORAL DAILY
Qty: 30 TABLET | Refills: 5 | Status: SHIPPED | OUTPATIENT
Start: 2021-03-25 | End: 2021-10-15 | Stop reason: SDUPTHER

## 2021-03-25 RX ORDER — MECLIZINE HCL 12.5 MG/1
12.5 TABLET ORAL 3 TIMES DAILY PRN
Qty: 30 TABLET | Refills: 3 | Status: SHIPPED | OUTPATIENT
Start: 2021-03-25 | End: 2022-08-10 | Stop reason: SDUPTHER

## 2021-03-25 RX ORDER — AMLODIPINE BESYLATE 2.5 MG/1
2.5 TABLET ORAL DAILY
Qty: 30 TABLET | Refills: 5 | Status: SHIPPED | OUTPATIENT
Start: 2021-03-25 | End: 2022-02-18

## 2021-03-25 NOTE — PROGRESS NOTES
Subjective  Sal Siu is a 85 y.o. female    HPI coming in for follow-up patient with hypertension and B12 deficiency on supplement has dyslipidemia has occasional bouts of vertigo for which she uses meclizine as needed.  Complains of lower extremity swelling which bothers her occasionally.  Has recurring complaints of right sided buttock pain.  Especially with excessive standing.  No new trauma symptoms are not related with walking.  Pain does not keep her up at night.  Has had lumbosacral intervertebral disc disease in the past requiring surgical intervention    The following portions of the patient's history were reviewed and updated as appropriate: allergies, current medications, past family history, past medical history, past social history, past surgical history, and problem list.     Review of Systems   Constitutional: Positive for fatigue. Negative for activity change, appetite change and fever.   HENT: Negative for congestion, ear discharge, ear pain and trouble swallowing.    Eyes: Negative for photophobia and visual disturbance.   Respiratory: Negative for cough and shortness of breath.    Cardiovascular: Positive for leg swelling. Negative for chest pain and palpitations.   Gastrointestinal: Negative for abdominal distention, abdominal pain, constipation, diarrhea, nausea and vomiting.   Endocrine: Negative.    Genitourinary: Negative for dysuria, hematuria and urgency.   Musculoskeletal: Positive for arthralgias, back pain and gait problem. Negative for joint swelling and myalgias.   Skin: Negative for color change and rash.   Allergic/Immunologic: Negative.    Neurological: Negative for dizziness, weakness, light-headedness and headaches.   Hematological: Negative for adenopathy. Does not bruise/bleed easily.   Psychiatric/Behavioral: Negative for agitation, confusion and dysphoric mood. The patient is not nervous/anxious.        Visit Vitals  /70   Pulse 66   Temp 98.2 °F (36.8 °C)  "(Infrared)   Ht 157.5 cm (62\")   Wt 68 kg (150 lb)   LMP  (LMP Unknown)   SpO2 98%   BMI 27.44 kg/m²       Objective  Physical Exam  Constitutional:       General: She is not in acute distress.     Appearance: She is well-developed.   HENT:      Nose: Nose normal.   Eyes:      General: No scleral icterus.     Conjunctiva/sclera: Conjunctivae normal.   Neck:      Thyroid: No thyromegaly.      Trachea: No tracheal deviation.   Cardiovascular:      Rate and Rhythm: Normal rate and regular rhythm.      Heart sounds: No murmur heard.   No friction rub.   Pulmonary:      Effort: No respiratory distress.      Breath sounds: No wheezing or rales.   Abdominal:      General: There is no distension.      Palpations: Abdomen is soft. There is no mass.      Tenderness: There is no abdominal tenderness. There is no guarding.   Musculoskeletal:         General: No deformity. Normal range of motion.   Lymphadenopathy:      Cervical: No cervical adenopathy.   Skin:     General: Skin is warm and dry.      Findings: No erythema or rash.   Neurological:      Mental Status: She is alert and oriented to person, place, and time.      Cranial Nerves: No cranial nerve deficit.      Coordination: Coordination normal.      Deep Tendon Reflexes: Reflexes are normal and symmetric.   Psychiatric:         Behavior: Behavior normal.         Thought Content: Thought content normal.         Judgment: Judgment normal.         Diagnoses and all orders for this visit:    Benign essential HTN on amlodipine and lisinopril.  Will reduce amlodipine dosing in view of the side effect of edema increase lisinopril dosing    Leg swelling discussed low-sodium diet.  Hopefully the lower amlodipine dose should correct this problem significantly    Mixed hyperlipidemia continue with the dietary restrictions    Pain in right buttock suspect L1 radiculopathy.  Discussed stretching and conservative measures.  Had hip x-ray done about 3 years ago did not show " significant abnormality    Other orders  -     meclizine (ANTIVERT) 12.5 MG tablet; Take 1 tablet by mouth 3 (Three) Times a Day As Needed for Dizziness.

## 2021-05-10 ENCOUNTER — OFFICE VISIT (OUTPATIENT)
Dept: INTERNAL MEDICINE | Facility: CLINIC | Age: 86
End: 2021-05-10

## 2021-05-10 VITALS
WEIGHT: 149 LBS | HEART RATE: 64 BPM | OXYGEN SATURATION: 99 % | HEIGHT: 62 IN | SYSTOLIC BLOOD PRESSURE: 130 MMHG | DIASTOLIC BLOOD PRESSURE: 62 MMHG | BODY MASS INDEX: 27.42 KG/M2 | TEMPERATURE: 97.6 F

## 2021-05-10 DIAGNOSIS — M79.89 LEG SWELLING: ICD-10-CM

## 2021-05-10 DIAGNOSIS — I10 BENIGN ESSENTIAL HTN: Primary | ICD-10-CM

## 2021-05-10 DIAGNOSIS — E78.2 MIXED HYPERLIPIDEMIA: ICD-10-CM

## 2021-05-10 PROCEDURE — 99214 OFFICE O/P EST MOD 30 MIN: CPT | Performed by: INTERNAL MEDICINE

## 2021-05-10 NOTE — PROGRESS NOTES
"Subjective  Sal Siu is a 85 y.o. female    HPI coming in for follow-up patient with hypertension and dyslipidemia.  Complains of longstanding history of lower extremity edema has cut down on her diuretic use doing reasonably well.  No alcohol or tobacco use     The following portions of the patient's history were reviewed and updated as appropriate: allergies, current medications, past family history, past medical history, past social history, past surgical history, and problem list.     Review of Systems   Constitutional: Negative.  Negative for activity change, appetite change, fatigue and fever.   HENT: Negative for congestion, ear discharge, ear pain and trouble swallowing.    Eyes: Negative for photophobia and visual disturbance.   Respiratory: Negative for cough and shortness of breath.    Cardiovascular: Positive for leg swelling. Negative for chest pain and palpitations.   Gastrointestinal: Negative for abdominal distention, abdominal pain, constipation, diarrhea, nausea and vomiting.   Endocrine: Negative.    Genitourinary: Negative for dysuria, hematuria and urgency.   Musculoskeletal: Positive for arthralgias. Negative for back pain, joint swelling and myalgias.   Skin: Negative for color change and rash.   Allergic/Immunologic: Negative.    Neurological: Negative for dizziness, weakness, light-headedness and headaches.   Hematological: Negative for adenopathy. Does not bruise/bleed easily.   Psychiatric/Behavioral: Negative for agitation, confusion and dysphoric mood. The patient is not nervous/anxious.        Visit Vitals  /62   Pulse 64   Temp 97.6 °F (36.4 °C) (Infrared)   Ht 157.5 cm (62\")   Wt 67.6 kg (149 lb)   LMP  (LMP Unknown)   SpO2 99%   BMI 27.25 kg/m²       Objective  Physical Exam  Constitutional:       General: She is not in acute distress.     Appearance: She is well-developed.   HENT:      Nose: Nose normal.   Eyes:      General: No scleral icterus.     Conjunctiva/sclera: " Conjunctivae normal.   Neck:      Thyroid: No thyromegaly.      Trachea: No tracheal deviation.   Cardiovascular:      Rate and Rhythm: Normal rate and regular rhythm.      Heart sounds: No murmur heard.   No friction rub.   Pulmonary:      Effort: No respiratory distress.      Breath sounds: No wheezing or rales.   Abdominal:      General: There is no distension.      Palpations: Abdomen is soft. There is no mass.      Tenderness: There is no abdominal tenderness. There is no guarding.   Musculoskeletal:         General: Deformity present. Normal range of motion.      Right lower leg: Edema present.      Left lower leg: Edema present.   Lymphadenopathy:      Cervical: No cervical adenopathy.   Skin:     General: Skin is warm and dry.      Findings: No erythema or rash.   Neurological:      Mental Status: She is alert and oriented to person, place, and time.      Cranial Nerves: No cranial nerve deficit.      Coordination: Coordination normal.      Deep Tendon Reflexes: Reflexes are normal and symmetric.   Psychiatric:         Behavior: Behavior normal.         Thought Content: Thought content normal.         Judgment: Judgment normal.         Diagnoses and all orders for this visit:    Benign essential HTN stable advised to follow BP readings at home intermittently discussed low-sodium diet    Mixed hyperlipidemia tolerating statins discussed diet follow lipid profile    Leg swelling minimal edema discussed compression stockings leg elevation.  Salt restriction

## 2021-08-02 ENCOUNTER — APPOINTMENT (OUTPATIENT)
Dept: GENERAL RADIOLOGY | Facility: HOSPITAL | Age: 86
End: 2021-08-02

## 2021-08-02 ENCOUNTER — HOSPITAL ENCOUNTER (INPATIENT)
Facility: HOSPITAL | Age: 86
LOS: 3 days | Discharge: NURSING, REHAB OR LONG-TERM CARE | End: 2021-08-05
Attending: EMERGENCY MEDICINE | Admitting: INTERNAL MEDICINE

## 2021-08-02 DIAGNOSIS — Z91.81 STATUS POST FALL: ICD-10-CM

## 2021-08-02 DIAGNOSIS — S72.001A CLOSED TRAUMATIC NONDISPLACED FRACTURE OF NECK OF RIGHT FEMUR, INITIAL ENCOUNTER (HCC): ICD-10-CM
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
## 2021-08-02 DIAGNOSIS — S72.144A CLOSED NONDISPLACED INTERTROCHANTERIC FRACTURE OF RIGHT FEMUR, INITIAL ENCOUNTER (HCC): Primary | ICD-10-CM

## 2021-08-02 LAB
APTT PPP: 29.8 SECONDS (ref 24.5–37.2)
BASOPHILS # BLD AUTO: 0.04 10*3/MM3 (ref 0–0.2)
BASOPHILS NFR BLD AUTO: 0.5 % (ref 0–1.5)
DEPRECATED RDW RBC AUTO: 41.3 FL (ref 37–54)
EOSINOPHIL # BLD AUTO: 0.03 10*3/MM3 (ref 0–0.4)
EOSINOPHIL NFR BLD AUTO: 0.4 % (ref 0.3–6.2)
ERYTHROCYTE [DISTWIDTH] IN BLOOD BY AUTOMATED COUNT: 12.2 % (ref 12.3–15.4)
HCT VFR BLD AUTO: 38 % (ref 34–46.6)
HGB BLD-MCNC: 12.9 G/DL (ref 12–15.9)
IMM GRANULOCYTES # BLD AUTO: 0.03 10*3/MM3 (ref 0–0.05)
IMM GRANULOCYTES NFR BLD AUTO: 0.4 % (ref 0–0.5)
INR PPP: 0.97 (ref 0.9–1.1)
LYMPHOCYTES # BLD AUTO: 1.15 10*3/MM3 (ref 0.7–3.1)
LYMPHOCYTES NFR BLD AUTO: 13.4 % (ref 19.6–45.3)
MCH RBC QN AUTO: 31.1 PG (ref 26.6–33)
MCHC RBC AUTO-ENTMCNC: 33.9 G/DL (ref 31.5–35.7)
MCV RBC AUTO: 91.6 FL (ref 79–97)
MONOCYTES # BLD AUTO: 0.59 10*3/MM3 (ref 0.1–0.9)
MONOCYTES NFR BLD AUTO: 6.9 % (ref 5–12)
NEUTROPHILS NFR BLD AUTO: 6.73 10*3/MM3 (ref 1.7–7)
NEUTROPHILS NFR BLD AUTO: 78.4 % (ref 42.7–76)
NRBC BLD AUTO-RTO: 0 /100 WBC (ref 0–0.2)
PLATELET # BLD AUTO: 183 10*3/MM3 (ref 140–450)
PMV BLD AUTO: 12.6 FL (ref 6–12)
PROTHROMBIN TIME: 13.3 SECONDS (ref 12–15.1)
RBC # BLD AUTO: 4.15 10*6/MM3 (ref 3.77–5.28)
WBC # BLD AUTO: 8.57 10*3/MM3 (ref 3.4–10.8)

## 2021-08-02 PROCEDURE — 73502 X-RAY EXAM HIP UNI 2-3 VIEWS: CPT

## 2021-08-02 PROCEDURE — 80053 COMPREHEN METABOLIC PANEL: CPT | Performed by: PHYSICIAN ASSISTANT

## 2021-08-02 PROCEDURE — 93005 ELECTROCARDIOGRAM TRACING: CPT | Performed by: PHYSICIAN ASSISTANT

## 2021-08-02 PROCEDURE — 71045 X-RAY EXAM CHEST 1 VIEW: CPT

## 2021-08-02 PROCEDURE — 85730 THROMBOPLASTIN TIME PARTIAL: CPT | Performed by: PHYSICIAN ASSISTANT

## 2021-08-02 PROCEDURE — 84484 ASSAY OF TROPONIN QUANT: CPT | Performed by: PHYSICIAN ASSISTANT

## 2021-08-02 PROCEDURE — 25010000002 FENTANYL CITRATE (PF) 50 MCG/ML SOLUTION: Performed by: EMERGENCY MEDICINE

## 2021-08-02 PROCEDURE — 85610 PROTHROMBIN TIME: CPT | Performed by: PHYSICIAN ASSISTANT

## 2021-08-02 PROCEDURE — 25010000002 ONDANSETRON PER 1 MG: Performed by: PHYSICIAN ASSISTANT

## 2021-08-02 PROCEDURE — 99283 EMERGENCY DEPT VISIT LOW MDM: CPT

## 2021-08-02 PROCEDURE — 99222 1ST HOSP IP/OBS MODERATE 55: CPT | Performed by: INTERNAL MEDICINE

## 2021-08-02 PROCEDURE — 85025 COMPLETE CBC W/AUTO DIFF WBC: CPT | Performed by: PHYSICIAN ASSISTANT

## 2021-08-02 RX ORDER — ONDANSETRON 2 MG/ML
4 INJECTION INTRAMUSCULAR; INTRAVENOUS ONCE
Status: COMPLETED | OUTPATIENT
Start: 2021-08-02 | End: 2021-08-02

## 2021-08-02 RX ORDER — FENTANYL CITRATE 50 UG/ML
50 INJECTION, SOLUTION INTRAMUSCULAR; INTRAVENOUS
Status: DISCONTINUED | OUTPATIENT
Start: 2021-08-02 | End: 2021-08-03

## 2021-08-02 RX ADMIN — FENTANYL CITRATE 50 MCG: 50 INJECTION, SOLUTION INTRAMUSCULAR; INTRAVENOUS at 22:21

## 2021-08-02 RX ADMIN — ONDANSETRON 4 MG: 2 INJECTION INTRAMUSCULAR; INTRAVENOUS at 22:21

## 2021-08-02 RX ADMIN — FENTANYL CITRATE 50 MCG: 50 INJECTION, SOLUTION INTRAMUSCULAR; INTRAVENOUS at 23:28

## 2021-08-03 ENCOUNTER — ANESTHESIA (OUTPATIENT)
Dept: PERIOP | Facility: HOSPITAL | Age: 86
End: 2021-08-03

## 2021-08-03 ENCOUNTER — APPOINTMENT (OUTPATIENT)
Dept: ULTRASOUND IMAGING | Facility: HOSPITAL | Age: 86
End: 2021-08-03

## 2021-08-03 ENCOUNTER — APPOINTMENT (OUTPATIENT)
Dept: GENERAL RADIOLOGY | Facility: HOSPITAL | Age: 86
End: 2021-08-03

## 2021-08-03 ENCOUNTER — ANESTHESIA EVENT (OUTPATIENT)
Dept: PERIOP | Facility: HOSPITAL | Age: 86
End: 2021-08-03
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
## 2021-08-03 PROBLEM — S72.144A CLOSED NONDISPLACED INTERTROCHANTERIC FRACTURE OF RIGHT FEMUR (HCC): Status: RESOLVED | Noted: 2021-08-02 | Resolved: 2021-08-03

## 2021-08-03 PROBLEM — S72.001A: Status: ACTIVE | Noted: 2021-08-02

## 2021-08-03 LAB
ABO GROUP BLD: NORMAL
ABO GROUP BLD: NORMAL
ALBUMIN SERPL-MCNC: 4.3 G/DL (ref 3.5–5.2)
ALBUMIN/GLOB SERPL: 1.7 G/DL
ALP SERPL-CCNC: 91 U/L (ref 39–117)
ALT SERPL W P-5'-P-CCNC: 17 U/L (ref 1–33)
ANION GAP SERPL CALCULATED.3IONS-SCNC: 11.9 MMOL/L (ref 5–15)
ANION GAP SERPL CALCULATED.3IONS-SCNC: 7.5 MMOL/L (ref 5–15)
AST SERPL-CCNC: 15 U/L (ref 1–32)
BASOPHILS # BLD AUTO: 0.04 10*3/MM3 (ref 0–0.2)
BASOPHILS NFR BLD AUTO: 0.3 % (ref 0–1.5)
BILIRUB SERPL-MCNC: 0.5 MG/DL (ref 0–1.2)
BLD GP AB SCN SERPL QL: NEGATIVE
BUN SERPL-MCNC: 16 MG/DL (ref 8–23)
BUN SERPL-MCNC: 19 MG/DL (ref 8–23)
BUN/CREAT SERPL: 25.8 (ref 7–25)
BUN/CREAT SERPL: 31.7 (ref 7–25)
CALCIUM SPEC-SCNC: 9.1 MG/DL (ref 8.6–10.5)
CALCIUM SPEC-SCNC: 9.2 MG/DL (ref 8.6–10.5)
CHLORIDE SERPL-SCNC: 105 MMOL/L (ref 98–107)
CHLORIDE SERPL-SCNC: 107 MMOL/L (ref 98–107)
CO2 SERPL-SCNC: 22.1 MMOL/L (ref 22–29)
CO2 SERPL-SCNC: 23.5 MMOL/L (ref 22–29)
CREAT SERPL-MCNC: 0.6 MG/DL (ref 0.57–1)
CREAT SERPL-MCNC: 0.62 MG/DL (ref 0.57–1)
DEPRECATED RDW RBC AUTO: 40.7 FL (ref 37–54)
EOSINOPHIL # BLD AUTO: 0.02 10*3/MM3 (ref 0–0.4)
EOSINOPHIL NFR BLD AUTO: 0.2 % (ref 0.3–6.2)
ERYTHROCYTE [DISTWIDTH] IN BLOOD BY AUTOMATED COUNT: 12.2 % (ref 12.3–15.4)
GFR SERPL CREATININE-BSD FRML MDRD: 91 ML/MIN/1.73
GFR SERPL CREATININE-BSD FRML MDRD: 95 ML/MIN/1.73
GLOBULIN UR ELPH-MCNC: 2.6 GM/DL
GLUCOSE SERPL-MCNC: 138 MG/DL (ref 65–99)
GLUCOSE SERPL-MCNC: 138 MG/DL (ref 65–99)
HCT VFR BLD AUTO: 35 % (ref 34–46.6)
HGB BLD-MCNC: 11.8 G/DL (ref 12–15.9)
IMM GRANULOCYTES # BLD AUTO: 0.06 10*3/MM3 (ref 0–0.05)
IMM GRANULOCYTES NFR BLD AUTO: 0.5 % (ref 0–0.5)
LYMPHOCYTES # BLD AUTO: 1.44 10*3/MM3 (ref 0.7–3.1)
LYMPHOCYTES NFR BLD AUTO: 12.1 % (ref 19.6–45.3)
MCH RBC QN AUTO: 30.9 PG (ref 26.6–33)
MCHC RBC AUTO-ENTMCNC: 33.7 G/DL (ref 31.5–35.7)
MCV RBC AUTO: 91.6 FL (ref 79–97)
MONOCYTES # BLD AUTO: 0.86 10*3/MM3 (ref 0.1–0.9)
MONOCYTES NFR BLD AUTO: 7.2 % (ref 5–12)
NEUTROPHILS NFR BLD AUTO: 79.7 % (ref 42.7–76)
NEUTROPHILS NFR BLD AUTO: 9.47 10*3/MM3 (ref 1.7–7)
NRBC BLD AUTO-RTO: 0 /100 WBC (ref 0–0.2)
PLATELET # BLD AUTO: 168 10*3/MM3 (ref 140–450)
PMV BLD AUTO: 12.6 FL (ref 6–12)
POTASSIUM SERPL-SCNC: 3.8 MMOL/L (ref 3.5–5.2)
POTASSIUM SERPL-SCNC: 4.1 MMOL/L (ref 3.5–5.2)
PROT SERPL-MCNC: 6.9 G/DL (ref 6–8.5)
RBC # BLD AUTO: 3.82 10*6/MM3 (ref 3.77–5.28)
RH BLD: POSITIVE
RH BLD: POSITIVE
SARS-COV-2 RNA PNL SPEC NAA+PROBE: NOT DETECTED
SODIUM SERPL-SCNC: 138 MMOL/L (ref 136–145)
SODIUM SERPL-SCNC: 139 MMOL/L (ref 136–145)
T&S EXPIRATION DATE: NORMAL
TROPONIN T SERPL-MCNC: <0.01 NG/ML (ref 0–0.03)
TROPONIN T SERPL-MCNC: <0.01 NG/ML (ref 0–0.03)
WBC # BLD AUTO: 11.89 10*3/MM3 (ref 3.4–10.8)

## 2021-08-03 PROCEDURE — 25010000002 PROPOFOL 200 MG/20ML EMULSION: Performed by: NURSE ANESTHETIST, CERTIFIED REGISTERED

## 2021-08-03 PROCEDURE — 25010000002 HYDROMORPHONE 1 MG/ML SOLUTION: Performed by: NURSE ANESTHETIST, CERTIFIED REGISTERED

## 2021-08-03 PROCEDURE — 85025 COMPLETE CBC W/AUTO DIFF WBC: CPT | Performed by: INTERNAL MEDICINE

## 2021-08-03 PROCEDURE — C1769 GUIDE WIRE: HCPCS | Performed by: ORTHOPAEDIC SURGERY

## 2021-08-03 PROCEDURE — 76000 FLUOROSCOPY <1 HR PHYS/QHP: CPT

## 2021-08-03 PROCEDURE — 27236 TREAT THIGH FRACTURE: CPT | Performed by: ORTHOPAEDIC SURGERY

## 2021-08-03 PROCEDURE — 25010000002 ONDANSETRON PER 1 MG: Performed by: INTERNAL MEDICINE

## 2021-08-03 PROCEDURE — 25010000003 CEFAZOLIN PER 500 MG: Performed by: ORTHOPAEDIC SURGERY

## 2021-08-03 PROCEDURE — 25010000002 ONDANSETRON PER 1 MG: Performed by: NURSE ANESTHETIST, CERTIFIED REGISTERED

## 2021-08-03 PROCEDURE — 25010000002 HYDROMORPHONE PER 4 MG: Performed by: NURSE ANESTHETIST, CERTIFIED REGISTERED

## 2021-08-03 PROCEDURE — 86900 BLOOD TYPING SEROLOGIC ABO: CPT

## 2021-08-03 PROCEDURE — 86901 BLOOD TYPING SEROLOGIC RH(D): CPT

## 2021-08-03 PROCEDURE — 80048 BASIC METABOLIC PNL TOTAL CA: CPT | Performed by: INTERNAL MEDICINE

## 2021-08-03 PROCEDURE — C1713 ANCHOR/SCREW BN/BN,TIS/BN: HCPCS | Performed by: ORTHOPAEDIC SURGERY

## 2021-08-03 PROCEDURE — 87635 SARS-COV-2 COVID-19 AMP PRB: CPT | Performed by: INTERNAL MEDICINE

## 2021-08-03 PROCEDURE — 86901 BLOOD TYPING SEROLOGIC RH(D): CPT | Performed by: ORTHOPAEDIC SURGERY

## 2021-08-03 PROCEDURE — 25010000002 ONDANSETRON PER 1 MG: Performed by: ORTHOPAEDIC SURGERY

## 2021-08-03 PROCEDURE — 25010000002 MORPHINE SULFATE (PF) 2 MG/ML SOLUTION: Performed by: INTERNAL MEDICINE

## 2021-08-03 PROCEDURE — 25010000003 CEFAZOLIN SODIUM-DEXTROSE 2-3 GM-%(50ML) RECONSTITUTED SOLUTION: Performed by: ORTHOPAEDIC SURGERY

## 2021-08-03 PROCEDURE — 25010000002 DEXAMETHASONE PER 1 MG: Performed by: NURSE ANESTHETIST, CERTIFIED REGISTERED

## 2021-08-03 PROCEDURE — 99222 1ST HOSP IP/OBS MODERATE 55: CPT | Performed by: ORTHOPAEDIC SURGERY

## 2021-08-03 PROCEDURE — 84484 ASSAY OF TROPONIN QUANT: CPT | Performed by: INTERNAL MEDICINE

## 2021-08-03 PROCEDURE — 86900 BLOOD TYPING SEROLOGIC ABO: CPT | Performed by: ORTHOPAEDIC SURGERY

## 2021-08-03 PROCEDURE — 86850 RBC ANTIBODY SCREEN: CPT | Performed by: ORTHOPAEDIC SURGERY

## 2021-08-03 PROCEDURE — 99232 SBSQ HOSP IP/OBS MODERATE 35: CPT | Performed by: FAMILY MEDICINE

## 2021-08-03 PROCEDURE — 25010000002 ROPIVACAINE PER 1 MG: Performed by: NURSE ANESTHETIST, CERTIFIED REGISTERED

## 2021-08-03 PROCEDURE — 0QS604Z REPOSITION RIGHT UPPER FEMUR WITH INTERNAL FIXATION DEVICE, OPEN APPROACH: ICD-10-PCS | Performed by: ORTHOPAEDIC SURGERY

## 2021-08-03 DEVICE — IMPLANTABLE DEVICE: Type: IMPLANTABLE DEVICE | Status: FUNCTIONAL

## 2021-08-03 RX ORDER — NALOXONE HCL 0.4 MG/ML
0.4 VIAL (ML) INJECTION
Status: DISCONTINUED | OUTPATIENT
Start: 2021-08-03 | End: 2021-08-05 | Stop reason: HOSPADM

## 2021-08-03 RX ORDER — SODIUM CHLORIDE 9 MG/ML
100 INJECTION, SOLUTION INTRAVENOUS CONTINUOUS
Status: DISCONTINUED | OUTPATIENT
Start: 2021-08-03 | End: 2021-08-03

## 2021-08-03 RX ORDER — POLYETHYLENE GLYCOL 3350 17 G/17G
17 POWDER, FOR SOLUTION ORAL DAILY PRN
Status: DISCONTINUED | OUTPATIENT
Start: 2021-08-03 | End: 2021-08-05 | Stop reason: HOSPADM

## 2021-08-03 RX ORDER — HYDROMORPHONE HCL 110MG/55ML
PATIENT CONTROLLED ANALGESIA SYRINGE INTRAVENOUS AS NEEDED
Status: DISCONTINUED | OUTPATIENT
Start: 2021-08-03 | End: 2021-08-03 | Stop reason: SURG

## 2021-08-03 RX ORDER — METOCLOPRAMIDE HYDROCHLORIDE 5 MG/ML
5 INJECTION INTRAMUSCULAR; INTRAVENOUS EVERY 4 HOURS PRN
Status: DISCONTINUED | OUTPATIENT
Start: 2021-08-03 | End: 2021-08-05 | Stop reason: HOSPADM

## 2021-08-03 RX ORDER — PROPOFOL 10 MG/ML
INJECTION, EMULSION INTRAVENOUS AS NEEDED
Status: DISCONTINUED | OUTPATIENT
Start: 2021-08-03 | End: 2021-08-03 | Stop reason: SURG

## 2021-08-03 RX ORDER — ONDANSETRON 2 MG/ML
4 INJECTION INTRAMUSCULAR; INTRAVENOUS ONCE AS NEEDED
Status: DISCONTINUED | OUTPATIENT
Start: 2021-08-03 | End: 2021-08-03 | Stop reason: HOSPADM

## 2021-08-03 RX ORDER — ROPIVACAINE HYDROCHLORIDE 5 MG/ML
INJECTION, SOLUTION EPIDURAL; INFILTRATION; PERINEURAL
Status: COMPLETED | OUTPATIENT
Start: 2021-08-03 | End: 2021-08-03

## 2021-08-03 RX ORDER — CEFAZOLIN SODIUM 2 G/50ML
2 SOLUTION INTRAVENOUS ONCE
Status: COMPLETED | OUTPATIENT
Start: 2021-08-03 | End: 2021-08-03

## 2021-08-03 RX ORDER — DEXAMETHASONE SODIUM PHOSPHATE 4 MG/ML
INJECTION, SOLUTION INTRA-ARTICULAR; INTRALESIONAL; INTRAMUSCULAR; INTRAVENOUS; SOFT TISSUE AS NEEDED
Status: DISCONTINUED | OUTPATIENT
Start: 2021-08-03 | End: 2021-08-03 | Stop reason: SURG

## 2021-08-03 RX ORDER — MEPERIDINE HYDROCHLORIDE 25 MG/ML
25 INJECTION INTRAMUSCULAR; INTRAVENOUS; SUBCUTANEOUS ONCE AS NEEDED
Status: DISCONTINUED | OUTPATIENT
Start: 2021-08-03 | End: 2021-08-03 | Stop reason: HOSPADM

## 2021-08-03 RX ORDER — CEFAZOLIN SODIUM 2 G/50ML
2 SOLUTION INTRAVENOUS EVERY 8 HOURS
Status: COMPLETED | OUTPATIENT
Start: 2021-08-03 | End: 2021-08-04

## 2021-08-03 RX ORDER — AMOXICILLIN 250 MG
2 CAPSULE ORAL 2 TIMES DAILY
Status: DISCONTINUED | OUTPATIENT
Start: 2021-08-03 | End: 2021-08-05 | Stop reason: HOSPADM

## 2021-08-03 RX ORDER — LISINOPRIL 20 MG/1
40 TABLET ORAL DAILY
Status: DISCONTINUED | OUTPATIENT
Start: 2021-08-03 | End: 2021-08-05 | Stop reason: HOSPADM

## 2021-08-03 RX ORDER — SODIUM CHLORIDE 0.9 % (FLUSH) 0.9 %
3 SYRINGE (ML) INJECTION EVERY 12 HOURS SCHEDULED
Status: DISCONTINUED | OUTPATIENT
Start: 2021-08-03 | End: 2021-08-05 | Stop reason: HOSPADM

## 2021-08-03 RX ORDER — DOCUSATE SODIUM 100 MG/1
100 CAPSULE, LIQUID FILLED ORAL 2 TIMES DAILY
Status: DISCONTINUED | OUTPATIENT
Start: 2021-08-03 | End: 2021-08-05 | Stop reason: HOSPADM

## 2021-08-03 RX ORDER — SODIUM CHLORIDE 0.9 % (FLUSH) 0.9 %
10 SYRINGE (ML) INJECTION AS NEEDED
Status: DISCONTINUED | OUTPATIENT
Start: 2021-08-03 | End: 2021-08-03 | Stop reason: HOSPADM

## 2021-08-03 RX ORDER — AMLODIPINE BESYLATE 5 MG/1
2.5 TABLET ORAL DAILY
Status: DISCONTINUED | OUTPATIENT
Start: 2021-08-03 | End: 2021-08-05 | Stop reason: HOSPADM

## 2021-08-03 RX ORDER — SODIUM CHLORIDE 0.9 % (FLUSH) 0.9 %
1-10 SYRINGE (ML) INJECTION AS NEEDED
Status: DISCONTINUED | OUTPATIENT
Start: 2021-08-03 | End: 2021-08-05 | Stop reason: HOSPADM

## 2021-08-03 RX ORDER — BISACODYL 10 MG
10 SUPPOSITORY, RECTAL RECTAL DAILY PRN
Status: DISCONTINUED | OUTPATIENT
Start: 2021-08-03 | End: 2021-08-05 | Stop reason: HOSPADM

## 2021-08-03 RX ORDER — PANTOPRAZOLE SODIUM 40 MG/1
40 TABLET, DELAYED RELEASE ORAL EVERY MORNING
Status: DISCONTINUED | OUTPATIENT
Start: 2021-08-03 | End: 2021-08-05 | Stop reason: HOSPADM

## 2021-08-03 RX ORDER — MORPHINE SULFATE 4 MG/ML
4 INJECTION, SOLUTION INTRAMUSCULAR; INTRAVENOUS
Status: DISCONTINUED | OUTPATIENT
Start: 2021-08-03 | End: 2021-08-05 | Stop reason: HOSPADM

## 2021-08-03 RX ORDER — ONDANSETRON 4 MG/1
4 TABLET, FILM COATED ORAL EVERY 6 HOURS PRN
Status: DISCONTINUED | OUTPATIENT
Start: 2021-08-03 | End: 2021-08-05 | Stop reason: HOSPADM

## 2021-08-03 RX ORDER — HYDROCODONE BITARTRATE AND ACETAMINOPHEN 7.5; 325 MG/1; MG/1
1 TABLET ORAL EVERY 6 HOURS PRN
Status: DISCONTINUED | OUTPATIENT
Start: 2021-08-03 | End: 2021-08-05 | Stop reason: HOSPADM

## 2021-08-03 RX ORDER — MORPHINE SULFATE 2 MG/ML
2 INJECTION, SOLUTION INTRAMUSCULAR; INTRAVENOUS
Status: DISCONTINUED | OUTPATIENT
Start: 2021-08-03 | End: 2021-08-03

## 2021-08-03 RX ORDER — ACETAMINOPHEN 650 MG/1
650 SUPPOSITORY RECTAL EVERY 4 HOURS PRN
Status: DISCONTINUED | OUTPATIENT
Start: 2021-08-03 | End: 2021-08-03

## 2021-08-03 RX ORDER — ONDANSETRON 2 MG/ML
4 INJECTION INTRAMUSCULAR; INTRAVENOUS EVERY 6 HOURS PRN
Status: DISCONTINUED | OUTPATIENT
Start: 2021-08-03 | End: 2021-08-05 | Stop reason: HOSPADM

## 2021-08-03 RX ORDER — SODIUM CHLORIDE 0.9 % (FLUSH) 0.9 %
3-10 SYRINGE (ML) INJECTION AS NEEDED
Status: DISCONTINUED | OUTPATIENT
Start: 2021-08-03 | End: 2021-08-03

## 2021-08-03 RX ORDER — ONDANSETRON 2 MG/ML
INJECTION INTRAMUSCULAR; INTRAVENOUS AS NEEDED
Status: DISCONTINUED | OUTPATIENT
Start: 2021-08-03 | End: 2021-08-03 | Stop reason: SURG

## 2021-08-03 RX ORDER — LIDOCAINE HYDROCHLORIDE 20 MG/ML
INJECTION, SOLUTION INTRAVENOUS AS NEEDED
Status: DISCONTINUED | OUTPATIENT
Start: 2021-08-03 | End: 2021-08-03 | Stop reason: SURG

## 2021-08-03 RX ORDER — BISACODYL 5 MG/1
5 TABLET, DELAYED RELEASE ORAL DAILY PRN
Status: DISCONTINUED | OUTPATIENT
Start: 2021-08-03 | End: 2021-08-05 | Stop reason: HOSPADM

## 2021-08-03 RX ORDER — SODIUM CHLORIDE, SODIUM LACTATE, POTASSIUM CHLORIDE, CALCIUM CHLORIDE 600; 310; 30; 20 MG/100ML; MG/100ML; MG/100ML; MG/100ML
50 INJECTION, SOLUTION INTRAVENOUS CONTINUOUS
Status: DISCONTINUED | OUTPATIENT
Start: 2021-08-03 | End: 2021-08-04

## 2021-08-03 RX ORDER — MORPHINE SULFATE 2 MG/ML
2 INJECTION, SOLUTION INTRAMUSCULAR; INTRAVENOUS
Status: DISCONTINUED | OUTPATIENT
Start: 2021-08-03 | End: 2021-08-03 | Stop reason: HOSPADM

## 2021-08-03 RX ORDER — ONDANSETRON 2 MG/ML
4 INJECTION INTRAMUSCULAR; INTRAVENOUS EVERY 6 HOURS PRN
Status: DISCONTINUED | OUTPATIENT
Start: 2021-08-03 | End: 2021-08-03

## 2021-08-03 RX ORDER — SODIUM CHLORIDE, SODIUM LACTATE, POTASSIUM CHLORIDE, CALCIUM CHLORIDE 600; 310; 30; 20 MG/100ML; MG/100ML; MG/100ML; MG/100ML
1000 INJECTION, SOLUTION INTRAVENOUS CONTINUOUS
Status: DISCONTINUED | OUTPATIENT
Start: 2021-08-03 | End: 2021-08-03

## 2021-08-03 RX ORDER — ACETAMINOPHEN 160 MG/5ML
650 SOLUTION ORAL EVERY 4 HOURS PRN
Status: DISCONTINUED | OUTPATIENT
Start: 2021-08-03 | End: 2021-08-03

## 2021-08-03 RX ORDER — ACETAMINOPHEN 325 MG/1
650 TABLET ORAL EVERY 4 HOURS PRN
Status: DISCONTINUED | OUTPATIENT
Start: 2021-08-03 | End: 2021-08-03

## 2021-08-03 RX ORDER — ATORVASTATIN CALCIUM 40 MG/1
40 TABLET, FILM COATED ORAL DAILY
Status: DISCONTINUED | OUTPATIENT
Start: 2021-08-03 | End: 2021-08-05 | Stop reason: HOSPADM

## 2021-08-03 RX ADMIN — ROPIVACAINE HYDROCHLORIDE 30 ML: 5 INJECTION, SOLUTION EPIDURAL; INFILTRATION; PERINEURAL at 11:49

## 2021-08-03 RX ADMIN — LIDOCAINE HYDROCHLORIDE 60 MG: 20 INJECTION, SOLUTION INTRAVENOUS at 10:35

## 2021-08-03 RX ADMIN — SODIUM CHLORIDE, PRESERVATIVE FREE 3 ML: 5 INJECTION INTRAVENOUS at 08:12

## 2021-08-03 RX ADMIN — CEFAZOLIN SODIUM 2 G: 2 SOLUTION INTRAVENOUS at 10:20

## 2021-08-03 RX ADMIN — ONDANSETRON 4 MG: 2 INJECTION INTRAMUSCULAR; INTRAVENOUS at 10:35

## 2021-08-03 RX ADMIN — PROPOFOL 100 MG: 10 INJECTION, EMULSION INTRAVENOUS at 10:35

## 2021-08-03 RX ADMIN — LISINOPRIL 40 MG: 20 TABLET ORAL at 08:12

## 2021-08-03 RX ADMIN — SODIUM CHLORIDE 100 ML/HR: 9 INJECTION, SOLUTION INTRAVENOUS at 01:18

## 2021-08-03 RX ADMIN — DEXAMETHASONE SODIUM PHOSPHATE 4 MG: 4 INJECTION, SOLUTION INTRAMUSCULAR; INTRAVENOUS at 11:11

## 2021-08-03 RX ADMIN — ATORVASTATIN CALCIUM 40 MG: 40 TABLET, FILM COATED ORAL at 08:12

## 2021-08-03 RX ADMIN — ONDANSETRON 4 MG: 2 INJECTION INTRAMUSCULAR; INTRAVENOUS at 13:11

## 2021-08-03 RX ADMIN — SODIUM CHLORIDE, POTASSIUM CHLORIDE, SODIUM LACTATE AND CALCIUM CHLORIDE 1000 ML: 600; 310; 30; 20 INJECTION, SOLUTION INTRAVENOUS at 09:25

## 2021-08-03 RX ADMIN — SODIUM CHLORIDE, POTASSIUM CHLORIDE, SODIUM LACTATE AND CALCIUM CHLORIDE: 600; 310; 30; 20 INJECTION, SOLUTION INTRAVENOUS at 11:35

## 2021-08-03 RX ADMIN — HYDROMORPHONE HYDROCHLORIDE 0.4 MG: 2 INJECTION, SOLUTION INTRAMUSCULAR; INTRAVENOUS; SUBCUTANEOUS at 10:28

## 2021-08-03 RX ADMIN — DOCUSATE SODIUM 100 MG: 100 CAPSULE, LIQUID FILLED ORAL at 20:45

## 2021-08-03 RX ADMIN — CEFAZOLIN SODIUM 2 G: 2 SOLUTION INTRAVENOUS at 17:34

## 2021-08-03 RX ADMIN — SODIUM CHLORIDE, POTASSIUM CHLORIDE, SODIUM LACTATE AND CALCIUM CHLORIDE 50 ML/HR: 600; 310; 30; 20 INJECTION, SOLUTION INTRAVENOUS at 16:10

## 2021-08-03 RX ADMIN — SODIUM CHLORIDE, POTASSIUM CHLORIDE, SODIUM LACTATE AND CALCIUM CHLORIDE 100 ML/HR: 600; 310; 30; 20 INJECTION, SOLUTION INTRAVENOUS at 13:13

## 2021-08-03 RX ADMIN — MORPHINE SULFATE 2 MG: 2 INJECTION, SOLUTION INTRAMUSCULAR; INTRAVENOUS at 01:24

## 2021-08-03 RX ADMIN — AMLODIPINE BESYLATE 2.5 MG: 5 TABLET ORAL at 08:12

## 2021-08-03 RX ADMIN — HYDROMORPHONE HYDROCHLORIDE 0.5 MG: 1 INJECTION, SOLUTION INTRAMUSCULAR; INTRAVENOUS; SUBCUTANEOUS at 11:55

## 2021-08-03 RX ADMIN — DOCUSATE SODIUM 50MG AND SENNOSIDES 8.6MG 2 TABLET: 8.6; 5 TABLET, FILM COATED ORAL at 20:45

## 2021-08-03 RX ADMIN — DEXAMETHASONE SODIUM PHOSPHATE 4 MG: 4 INJECTION, SOLUTION INTRAMUSCULAR; INTRAVENOUS at 10:35

## 2021-08-03 RX ADMIN — SODIUM CHLORIDE, PRESERVATIVE FREE 3 ML: 5 INJECTION INTRAVENOUS at 16:10

## 2021-08-03 RX ADMIN — SODIUM CHLORIDE, PRESERVATIVE FREE 3 ML: 5 INJECTION INTRAVENOUS at 01:18

## 2021-08-03 RX ADMIN — ONDANSETRON 4 MG: 2 INJECTION INTRAMUSCULAR; INTRAVENOUS at 01:24

## 2021-08-03 NOTE — CONSULTS
[facility]   HISTORY AND PHYSICAL      Name:  Sal Siu   Age:  86 y.o.  Sex:  female  :  1935  MRN:  [id number]   Visit Number:  [id number]  Admission Date:  2021  Date Of Service:  21  Primary Care Physician:  David Barrios MD    Chief Complaint:     Acute right hip pain after mechanical fall.    History Of Presenting Illness:      86 year old woman with medical history including hypertension, cardiac disease, dyslipidemia, sleep apnea, GERD, UTI's and osteoarthritis that describes that she fell yesterday late in the afternoon when in her basement carrying a laundry basket.  She fell onto a concrete basement floor and had immediate right hip pain.  No reported head trauma or loss of consciousness.  She could not stand up or bear weight with the right leg.  No other injuries reported.  Patient was brought by EMS to [facility] where exam and imaging shows a right hip Garden type 2 femur neck fracture.  Patient was admitted to the hospitalist service late last night, and orthopaedic surgery consulted for evaluation and management of the right hip fracture.       Review Of Systems:    General ROS: No fever, chills or loss of consciousness.  Psychological ROS:  No confusion, no acute cognitive change.  Ophthalmic ROS: No acute visual change.  History of right cataract.  ENT ROS: No acute sore throat, nasal congestion.   Allergy and Immunology ROS: No rash.  Hematological and Lymphatic ROS: Not on formal anticoagulation.  Respiratory ROS: No acute shortness of breath.  Cardiovascular ROS: No reported acute chest pain or tightness.  Gastrointestinal ROS: No acute abdominal pain.  History of chronic constipation.  Plan bowel regimen as appropriate.  Genito-Urinary ROS: No dysuria.   History of UTI's.  UA ordered.  Musculoskeletal ROS: Chronic back pain. History of lumbar disc surgery .  Acute right hip pain.  No focal calf pain.  Neurological ROS: No acute focal motor deficit. Right  thigh and leg weak due to injury and fracture. No acute loss of sensation.  Dermatological ROS: No redness, rash or pruritis.  No open wound.     Past Medical History:    Past Medical History:   Diagnosis Date   • Arthritis    • Body piercing     EARS ONLY   • Cataract, right eye    • Chronic hypertension    • Constipation    • Dyslipidemia    • GERD (gastroesophageal reflux disease)    • Glaucoma    • Hx of migraines    • Hyperlipidemia    • Hypertensive cardiovascular disease    • Incontinence in female    • Intermittent palpitations    • Obstructive sleep apnea     DOES NOT USE BPAP/ CPAP   • PONV (postoperative nausea and vomiting)    • UTI (urinary tract infection)    • Wears glasses    • Wears partial dentures     LOWER        Past Surgical history:    Past Surgical History:   Procedure Laterality Date   • APPENDECTOMY  1946   • COLONOSCOPY     • DILATATION AND CURETTAGE  1970   • ENDOSCOPY     • EYE CAPSULOTOMY WITH LASER Left 5/22/2018    Procedure: EYE CAPSULOTOMY WITH LASER LEFT;  Surgeon: Meseret Simmons MD;  Location: [facility];  Service: Ophthalmology   • EYE SURGERY Bilateral     CATARACTS REMOVED   • HYSTERECTOMY  1971    COMPLETE   • LAPAROSCOPIC CHOLECYSTECTOMY     • LIPOMA EXCISION      Lower back   • LUMBAR DISC SURGERY  1997   • OOPHORECTOMY         Social History:    Social History     Socioeconomic History   • Marital status:      Spouse name: Not on file   • Number of children: Not on file   • Years of education: Not on file   • Highest education level: Not on file   Tobacco Use   • Smoking status: Never Smoker   • Smokeless tobacco: Never Used   Substance and Sexual Activity   • Alcohol use: No   • Drug use: No   • Sexual activity: Defer       Family History:    Family History   Problem Relation Age of Onset   • Heart attack Mother    • Hypertension Mother    • Heart attack Father    • Cancer Father        Allergies:      Sulfa antibiotics and Other    Home  Medications:    Prior to Admission Medications     Prescriptions Last Dose Informant Patient Reported? Taking?    amLODIPine (NORVASC) 2.5 MG tablet 8/2/2021  No Yes    Take 1 tablet by mouth Daily.    atorvastatin (LIPITOR) 40 MG tablet 8/2/2021  No Yes    Take 1 tablet by mouth once daily    Cholecalciferol (VITAMIN D3) 5000 units capsule capsule 8/2/2021  No Yes    Take 1 capsule by mouth Daily.    ciclopirox (LOPROX) 0.77 % cream 8/2/2021  No Yes    Apply  topically to the appropriate area as directed Every Night. Clean nail with alcohol for 7 days prior to application    Cyanocobalamin (VITAMIN B-12 PO) 8/2/2021  Yes Yes    Take  by mouth Daily.    lisinopril (PRINIVIL,ZESTRIL) 40 MG tablet 8/2/2021  No Yes    Take 1 tablet by mouth Daily.    meclizine (ANTIVERT) 12.5 MG tablet Past Month  No Yes    Take 1 tablet by mouth 3 (Three) Times a Day As Needed for Dizziness.    omeprazole (priLOSEC) 20 MG capsule 8/2/2021  No Yes    Take 1 capsule by mouth once daily    torsemide (DEMADEX) 10 MG tablet Past Month  No Yes    TAKE 1 TABLET BY MOUTH ONCE DAILY.  MAY  TAKE  AN  ADDITIONAL  10  MG  TABLET  AS  NEEDED  NO  MORE  THAN  ONCE  A  WEEK.             ED Medications:    Medications   amLODIPine (NORVASC) tablet 2.5 mg (2.5 mg Oral Given 8/3/21 0812)   atorvastatin (LIPITOR) tablet 40 mg (40 mg Oral Given 8/3/21 0812)   lisinopril (PRINIVIL,ZESTRIL) tablet 40 mg (40 mg Oral Given 8/3/21 0812)   pantoprazole (PROTONIX) EC tablet 40 mg (40 mg Oral Not Given 8/3/21 0600)   sodium chloride 0.9 % flush 3 mL (3 mL Intravenous Given 8/3/21 0812)   sodium chloride 0.9 % flush 3-10 mL (has no administration in time range)   acetaminophen (TYLENOL) tablet 650 mg (has no administration in time range)     Or   acetaminophen (TYLENOL) 160 MG/5ML solution 650 mg (has no administration in time range)     Or   acetaminophen (TYLENOL) suppository 650 mg (has no administration in time range)   ondansetron (ZOFRAN) injection 4 mg (4  "mg Intravenous Given 8/3/21 0124)   Pharmacy to Dose enoxaparin (LOVENOX) (has no administration in time range)   sodium chloride 0.9 % infusion (100 mL/hr Intravenous New Bag 8/3/21 0118)   Morphine sulfate (PF) injection 2 mg (2 mg Intravenous Given 8/3/21 0124)   enoxaparin (LOVENOX) syringe 40 mg (40 mg Subcutaneous Not Given 8/3/21 0556)   ondansetron (ZOFRAN) injection 4 mg (4 mg Intravenous Given 8/2/21 2221)       Vital Signs:    Temp:  [97.6 °F (36.4 °C)-98 °F (36.7 °C)] 97.9 °F (36.6 °C)  Heart Rate:  [75-79] 75  Resp:  [17-20] 17  BP: (142-157)/(52-64) 145/52    Vitals:    08/02/21 2150 08/03/21 0036 08/03/21 0347 08/03/21 0736   BP: 152/64 157/56 142/54 145/52   BP Location: Left arm Right arm Right arm Right arm   Patient Position: Lying Lying Lying Lying   Pulse: 76 79 76 75   Resp: 18 20 18 17   Temp: 97.6 °F (36.4 °C) 97.7 °F (36.5 °C) 98 °F (36.7 °C) 97.9 °F (36.6 °C)   TempSrc: Oral Temporal Temporal Oral   SpO2: 96% 90% 94% 95%   Weight: 67.6 kg (149 lb) 69.2 kg (152 lb 8.9 oz)     Height: 157.5 cm (62\") 157.5 cm (62\")             08/02/21 2150 08/03/21  0036   Weight: 67.6 kg (149 lb) 69.2 kg (152 lb 8.9 oz)       Body mass index is 27.9 kg/m².    No intake or output data in the 24 hours ending 08/03/21 0828    Physical Exam:    General Appearance:    Alert and cooperative, no acute distress.   Head:    Atraumatic and normocephalic.   Eyes:          Extra-ocular movements are within normal limits.   Back:     No acute midline spine pain.   Lungs:     Breath sounds heard bilaterally.  No active wheezing.    Heart:    Regular rate and rhythm.   Abdomen:     Soft, non-distended, no guarding.   Extremities:   Moves all extremities, right hip and leg limited mobility due to pain and fracture.   Pulses:   Pulses palpable, no cyanosis.   Skin:   No rash, no open wound.   Neurologic:   Motor and sensation grossly intact.     Labs:    Lab Results (last 24 hours)     Procedure Component Value Units " Date/Time    Troponin [[id number]]  (Normal) Collected: 08/03/21 0631    Specimen: Blood Updated: 08/03/21 0734     Troponin T <0.010 ng/mL     Narrative:      Troponin T Reference Range:  <= 0.03 ng/mL-   Negative for AMI  >0.03 ng/mL-     Abnormal for myocardial necrosis.  Clinicians would have to utilize clinical acumen, EKG, Troponin and serial changes to determine if it is an Acute Myocardial Infarction or myocardial injury due to an underlying chronic condition.       Results may be falsely decreased if patient taking Biotin.      Basic Metabolic Panel [[id number]]  (Abnormal) Collected: 08/03/21 0631    Specimen: Blood Updated: 08/03/21 0734     Glucose 138 mg/dL      BUN 16 mg/dL      Creatinine 0.62 mg/dL      Sodium 138 mmol/L      Potassium 4.1 mmol/L      Chloride 107 mmol/L      CO2 23.5 mmol/L      Calcium 9.1 mg/dL      eGFR Non African Amer 91 mL/min/1.73      BUN/Creatinine Ratio 25.8     Anion Gap 7.5 mmol/L     Narrative:      GFR Normal >60  Chronic Kidney Disease <60  Kidney Failure <15      CBC Auto Differential [[id number]]  (Abnormal) Collected: 08/03/21 0631    Specimen: Blood Updated: 08/03/21 0727     WBC 11.89 10*3/mm3      RBC 3.82 10*6/mm3      Hemoglobin 11.8 g/dL      Hematocrit 35.0 %      MCV 91.6 fL      MCH 30.9 pg      MCHC 33.7 g/dL      RDW 12.2 %      RDW-SD 40.7 fl      MPV 12.6 fL      Platelets 168 10*3/mm3      Neutrophil % 79.7 %      Lymphocyte % 12.1 %      Monocyte % 7.2 %      Eosinophil % 0.2 %      Basophil % 0.3 %      Immature Grans % 0.5 %      Neutrophils, Absolute 9.47 10*3/mm3      Lymphocytes, Absolute 1.44 10*3/mm3      Monocytes, Absolute 0.86 10*3/mm3      Eosinophils, Absolute 0.02 10*3/mm3      Basophils, Absolute 0.04 10*3/mm3      Immature Grans, Absolute 0.06 10*3/mm3      nRBC 0.0 /100 WBC     COVID PRE-OP / PRE-PROCEDURE SCREENING ORDER (NO ISOLATION) - Swab, Nasal Cavity [[id number]]  (Normal) Collected: 08/03/21 0016    Specimen: Swab from Nasal  Cavity Updated: 08/03/21 0057    Narrative:      The following orders were created for panel order COVID PRE-OP / PRE-PROCEDURE SCREENING ORDER (NO ISOLATION) - Swab, Nasal Cavity.  Procedure                               Abnormality         Status                     ---------                               -----------         ------                     COVID-19,[facility]...[[id number]]  Normal              Final result                 Please view results for these tests on the individual orders.    COVID-19,[facility] IN-HOUSE,Nasal Swab No Transport Media 3-4 HR TAT - Swab, Nasal Cavity [[id number]]  (Normal) Collected: 08/03/21 0016    Specimen: Swab from Nasal Cavity Updated: 08/03/21 0057     COVID19 Not Detected    Narrative:      Fact sheet for providers: https://www.fda.gov/media/556948/download     Fact sheet for patients: https://www.fda.gov/media/988712/download    Test performed by PCR.    Consider negative results in combination with clinical observations, patient history, and epidemiological information.    Troponin [[id number]]  (Normal) Collected: 08/02/21 2336    Specimen: Blood Updated: 08/03/21 0009     Troponin T <0.010 ng/mL     Narrative:      Troponin T Reference Range:  <= 0.03 ng/mL-   Negative for AMI  >0.03 ng/mL-     Abnormal for myocardial necrosis.  Clinicians would have to utilize clinical acumen, EKG, Troponin and serial changes to determine if it is an Acute Myocardial Infarction or myocardial injury due to an underlying chronic condition.       Results may be falsely decreased if patient taking Biotin.      Comprehensive Metabolic Panel [[id number]]  (Abnormal) Collected: 08/02/21 2336    Specimen: Blood Updated: 08/03/21 0007     Glucose 138 mg/dL      BUN 19 mg/dL      Creatinine 0.60 mg/dL      Sodium 139 mmol/L      Potassium 3.8 mmol/L      Chloride 105 mmol/L      CO2 22.1 mmol/L      Calcium 9.2 mg/dL      Total Protein 6.9 g/dL      Albumin 4.30 g/dL      ALT (SGPT) 17 U/L       AST (SGOT) 15 U/L      Alkaline Phosphatase 91 U/L      Total Bilirubin 0.5 mg/dL      eGFR Non African Amer 95 mL/min/1.73      Globulin 2.6 gm/dL      A/G Ratio 1.7 g/dL      BUN/Creatinine Ratio 31.7     Anion Gap 11.9 mmol/L     Narrative:      GFR Normal >60  Chronic Kidney Disease <60  Kidney Failure <15      aPTT [[id number]]  (Normal) Collected: 08/02/21 2220    Specimen: Blood Updated: 08/02/21 2250     PTT 29.8 seconds     Protime-INR [[id number]]  (Normal) Collected: 08/02/21 2220    Specimen: Blood Updated: 08/02/21 2249     Protime 13.3 Seconds      INR 0.97    Narrative:      Suggested INR therapeutic range for stable oral anticoagulant therapy:    Low Intensity therapy:   1.5-2.0  Moderate Intensity therapy:   2.0-3.0  High Intensity therapy:   2.5-4.0    CBC & Differential [[id number]]  (Abnormal) Collected: 08/02/21 2220    Specimen: Blood Updated: 08/02/21 2234    Narrative:      The following orders were created for panel order CBC & Differential.  Procedure                               Abnormality         Status                     ---------                               -----------         ------                     CBC Auto Differential[[id number]]        Abnormal            Final result                 Please view results for these tests on the individual orders.    CBC Auto Differential [[id number]]  (Abnormal) Collected: 08/02/21 2220    Specimen: Blood Updated: 08/02/21 2234     WBC 8.57 10*3/mm3      RBC 4.15 10*6/mm3      Hemoglobin 12.9 g/dL      Hematocrit 38.0 %      MCV 91.6 fL      MCH 31.1 pg      MCHC 33.9 g/dL      RDW 12.2 %      RDW-SD 41.3 fl      MPV 12.6 fL      Platelets 183 10*3/mm3      Neutrophil % 78.4 %      Lymphocyte % 13.4 %      Monocyte % 6.9 %      Eosinophil % 0.4 %      Basophil % 0.5 %      Immature Grans % 0.4 %      Neutrophils, Absolute 6.73 10*3/mm3      Lymphocytes, Absolute 1.15 10*3/mm3      Monocytes, Absolute 0.59 10*3/mm3      Eosinophils, Absolute  0.03 10*3/mm3      Basophils, Absolute 0.04 10*3/mm3      Immature Grans, Absolute 0.03 10*3/mm3      nRBC 0.0 /100 WBC           Radiology:    Imaging Results (Last 72 Hours)     Procedure Component Value Units Date/Time    XR Hip With or Without Pelvis 2 - 3 View Right [[id number]] Resulted: 08/02/21 2245     Updated: 08/02/21 2246    XR Chest 1 View [[id number]] Resulted: 08/02/21 2245     Updated: 08/02/21 2245          Assessment:      Closed nondisplaced intertrochanteric fracture of right femur (CMS/HCC)    Benign essential HTN    Mixed hyperlipidemia    Status post fall    Orthopaedic Impression:    Closed nondisplaced Garden type 2 femur neck fracture.  Acute accidental mechanical fall.    Plan:     Risks, benefits, and alternative treatments discussed with the patient: [x] Yes [] No    Risk benefits and merits of the proposed surgery were discussed and the patient's questions were answered risks discussed including and not limited to:  Anesthesia reactions  Blood loss and possible transfusion  Infection  Deep venous thrombosis and pulmonary embolus  Nerve, vascular or tendon injury  Fracture  Deformity  Stiffness  Weakness  Prosthesis and implant issues such as loosening, material wear or dislocation  Skin necrosis  Revision surgery or further treatment  Recurrence of problem and condition     Informed consent: [] Signed  [x] To be obtained at hospital  [] Both    Informed consent process, review of hip fracture non-surgical and surgical options, treatments, risks, benefits, merits, aftercare, rehabilitation and anticipated outcome with patient.  Pain control.  NPO.  Bedrest pre-op.  Also per orders and instructions.  Medical optimization for planned hip fracture stabilization surgery.    PLANNED PROCEDURE:  PLACEMENT OF HIP CANNULATED COMPRESSION SCREWS FOR RIGHT FEMUR NECK FRACTURE.    Pelon Wooten MD  08/03/21  08:28 EDT

## 2021-08-03 NOTE — CASE MANAGEMENT/SOCIAL WORK
Discharge Planning Assessment  [facility]     Patient Name: Sal Siu  MRN: [id number]  Today's Date: 8/3/2021    Admit Date: 8/2/2021    Discharge Needs Assessment     Row Name 08/03/21 1447       Living Environment    Lives With  spouse    Current Living Arrangements  home/apartment/condo    Primary Care Provided by  self    Provides Primary Care For  no one    Family Caregiver if Needed  spouse    Quality of Family Relationships  involved    Able to Return to Prior Arrangements  yes       Resource/Environmental Concerns    Transportation Concerns  car, none       Transition Planning    Patient/Family Anticipates Transition to  inpatient rehabilitation facility    Transportation Anticipated  car, drives self;family or friend will provide       Discharge Needs Assessment    Equipment Currently Used at Home  none    Concerns to be Addressed  discharge planning    Equipment Needed After Discharge  none    Discharge Facility/Level of Care Needs  rehabilitation facility    Provided Post Acute Provider List?  Refused    Provided Post Acute Provider Quality & Resource List?  Refused        Discharge Plan     Row Name 08/03/21 1101       Plan    Plan  Spoke to pt and  at [facility] with her permission .Confirmed address and phone number .She is independent with ADLS .Dose not have a DME She reports he has a living will they are wanting rehab at [facility] Faxed a referral will need PT/Ot notes    Row Name 08/03/21 4391       Plan    Plan  Spoke to pt she was drowsy after surgery today will need to follow to marrow after she has had therapy        Continued Care and Services - Admitted Since 8/2/2021    Coordination has not been started for this encounter.       Expected Discharge Date and Time     Expected Discharge Date Expected Discharge Time    Aug 5, 2021         Demographic Summary     Row Name 08/03/21 1446       General Information    Admission Type  inpatient    Referral Source  admission list         Functional Status     Row Name 08/03/21 1446       Functional Status    Usual Activity Tolerance  good       Functional Status, IADL    Medications  independent    Meal Preparation  independent    Housekeeping  independent    Laundry  independent    Shopping  independent       Mental Status    General Appearance WDL  WDL        Psychosocial    No documentation.       Abuse/Neglect    No documentation.       Legal     Row Name 08/03/21 1447       Financial/Legal    Finance Comments  denies any difficulty with medication  food utilities etc        Substance Abuse    No documentation.       Patient Forms    No documentation.           Kae Tenorio RN

## 2021-08-03 NOTE — ANESTHESIA PREPROCEDURE EVALUATION
Anesthesia Evaluation     Patient summary reviewed and Nursing notes reviewed   history of anesthetic complications: PONV  NPO Solid Status: > 8 hours  NPO Liquid Status: > 8 hours           Airway   Mallampati: II  TM distance: >3 FB  Neck ROM: full  No difficulty expected  Dental    (+) lower dentures and partials    Pulmonary - normal exam   (+) sleep apnea,   (-) not a smoker  Cardiovascular - normal exam  Exercise tolerance: unable to assess    ECG reviewed    (+) hypertension less than 2 medications, hyperlipidemia,     ROS comment: 8/2021 ECG SR with long QT interval    ECHO 9/2020  · Mild tricuspid valve regurgitation is present.  · Estimated EF = 71%.  · Left ventricular systolic function is hyperdynamic (EF > 70).  · Left ventricular diastolic dysfunction (grade I) consistent with impaired relaxation.  · There is mild calcification of the aortic valve mainly affecting the non coronary cusp(s).  · Normal right ventricular cavity size, wall thickness, systolic function and septal motion noted.  · The aortic valve exhibits mild-moderate sclerosis.  · Moderate MAC is present.  · No evidence of pulmonary hypertension is present.  · There is no evidence of pericardial effusion.    Neuro/Psych- negative ROS  GI/Hepatic/Renal/Endo    (+)  GERD,      Musculoskeletal     Abdominal  - normal exam   Substance History - negative use  (-) alcohol use, drug use     OB/GYN negative ob/gyn ROS   (-)  Pregnant    Comment: Hysterectomy      Other   arthritis,      ROS/Med Hx Other: Benign essential HTN  Mixed hyperlipidemia  Leg swelling  Closed nondisplaced intertrochanteric fracture of right femur (CMS/HCC)  Status post fall  Fall  Traumatic closed nondisplaced fracture of neck of right femur     GLAUCOMA    Partial lower DENTURES                  Anesthesia Plan    ASA 3     general   (Risks and benefits of general anesthesia discussed with patient, including, aspiration, recall, dental damage, cardiac or respiratory  compromise, stroke, fluctuations in blood pressure, seizure or death.     Pt advised that a endotracheal tube (ETT), laryngeal mask airway (LMA) or mask would be utilized to maintain the airway. Pt verbalized understanding and agreed to plan.      Fascia iliaca PNB for post operative pain control. Risks/benefits/alternatives discussed. Pt verbalized understanding and agreed to plan of care. )  intravenous induction     Anesthetic plan, all risks, benefits, and alternatives have been provided, discussed and informed consent has been obtained with: patient.    Plan discussed with CRNA.

## 2021-08-03 NOTE — ANESTHESIA PROCEDURE NOTES
Airway  Urgency: elective    Date/Time: 8/3/2021 10:28 AM  Airway not difficult    General Information and Staff    Patient location during procedure: OR  CRNA: Rayray Lam CRNA    Indications and Patient Condition  Indications for airway management: airway protection    Preoxygenated: yes  Mask difficulty assessment: 0 - not attempted    Final Airway Details  Final airway type: supraglottic airway      Successful airway: classic  Size 3    Number of attempts at approach: 1    Additional Comments  Airway pressure leak at <20cm/h20

## 2021-08-03 NOTE — ED PROVIDER NOTES
Subjective   Patient is an 86-year-old female with history of arthritis, chronic hypertension, constipation, dyslipidemia, GERD, glaucoma, hyperlipidemia, hypertensive cardiovascular disease, obstructive sleep apnea and urinary tract infections presenting to the ER for evaluation of a fall.  Patient states earlier this afternoon she was in her basement carrying laundry when she accidentally stumbled and fell onto the concrete on her right hip.  She states that she has not been able to stand up and bear weight after this accident and has pain in that right hip.  She denies any head trauma or LOC.  She denies any chest pain, dizziness or shortness of breath prior to the fall.  She denies any other pain including headache, neck pain, upper extremity pain, paresthesias of her extremities, abdominal pain,, or any other symptoms patient states she did have some nausea right after this happened.  Denies any anticoagulation          Review of Systems   Constitutional: Negative for chills and fever.   HENT: Negative.    Eyes: Negative.    Respiratory: Negative.    Cardiovascular: Negative.    Gastrointestinal: Negative.    Genitourinary: Negative.    Musculoskeletal: Positive for arthralgias.   Skin: Negative.    Allergic/Immunologic: Negative for immunocompromised state.   Neurological: Negative.    Psychiatric/Behavioral: Negative.        Past Medical History:   Diagnosis Date   • Arthritis    • Body piercing     EARS ONLY   • Cataract, right eye    • Chronic hypertension    • Constipation    • Dyslipidemia    • GERD (gastroesophageal reflux disease)    • Glaucoma    • Hx of migraines    • Hyperlipidemia    • Hypertensive cardiovascular disease    • Incontinence in female    • Intermittent palpitations    • Obstructive sleep apnea     DOES NOT USE BPAP/ CPAP   • PONV (postoperative nausea and vomiting)    • UTI (urinary tract infection)    • Wears glasses    • Wears partial dentures     LOWER        Allergies   Allergen  "Reactions   • Sulfa Antibiotics Swelling     urticaria   • Other Nausea And Vomiting     MUSHROOMS-CAUSE 'HALLUCINATIONS'       Past Surgical History:   Procedure Laterality Date   • APPENDECTOMY  1946   • COLONOSCOPY     • DILATATION AND CURETTAGE  1970   • ENDOSCOPY     • EYE CAPSULOTOMY WITH LASER Left 5/22/2018    Procedure: EYE CAPSULOTOMY WITH LASER LEFT;  Surgeon: Meseret Simmons MD;  Location: [facility];  Service: Ophthalmology   • EYE SURGERY Bilateral     CATARACTS REMOVED   • HYSTERECTOMY  1971    COMPLETE   • LAPAROSCOPIC CHOLECYSTECTOMY     • LIPOMA EXCISION      Lower back   • LUMBAR DISC SURGERY  1997   • OOPHORECTOMY         Family History   Problem Relation Age of Onset   • Heart attack Mother    • Hypertension Mother    • Heart attack Father    • Cancer Father        Social History     Socioeconomic History   • Marital status:      Spouse name: Not on file   • Number of children: Not on file   • Years of education: Not on file   • Highest education level: Not on file   Tobacco Use   • Smoking status: Never Smoker   • Smokeless tobacco: Never Used   Substance and Sexual Activity   • Alcohol use: No   • Drug use: No   • Sexual activity: Defer           Objective   Physical Exam  Vitals and nursing note reviewed.     /64 (BP Location: Left arm, Patient Position: Lying)   Pulse 76   Temp 97.6 °F (36.4 °C) (Oral)   Resp 18   Ht 157.5 cm (62\")   Wt 67.6 kg (149 lb)   LMP  (LMP Unknown)   SpO2 96%   BMI 27.25 kg/m²     GEN: No acute distress, lying supine in stretcher.  She is awake and alert.  She is answering questions appropriately.  Head: Normocephalic, atraumatic  Eyes: EOM intact  ENT: Mask in place per protocol  Cardiovascular: Regular rate and rhythm  Lungs: Clear to auscultation bilaterally without adventitious sounds  Abdomen: Soft, nontender, nondistended, no peritoneal signs, no guarding  Extremities: No obvious bruising to the right hip.  She does have " tenderness over the lateral aspect of the right hip.  There is no tenderness over the knee or ankle of the right lower extremity.  Upper extremities unremarkable.  Left lower extremity unremarkable.  Dorsalis pedis pulses are 2+ and equal.  Neuro: GCS 15  Psych: Mood and affect are appropriate    Procedures           ED Course  ED Course as of Aug 03 0029   Mon Aug 02, 2021   2235 WBC: 8.57 [LA]   2235 Hemoglobin: 12.9 [LA]   2301 Spoke with Dr. Wooten who has made aware of the patient.  Dr. Gray has already seen and evaluated the patient here and will admit for further management.    [LA]      ED Course User Index  [LA] Flower Frias PA-C                                           MDM  Number of Diagnoses or Management Options  Closed nondisplaced intertrochanteric fracture of right femur, initial encounter (CMS/[facility])  Fall, initial encounter  Diagnosis management comments: On arrival, patient stable.  Differential includes fracture, dislocation, and other concerns.  Fall mechanical in nature.  Will obtain basic labs, troponin, coags, chest x-ray and EKG for medical clearance.  Will obtain x-ray of the right hip as well.  Will give pain and nausea control.    Patient had mild elevation of glucose but there were no other lab abnormalities.  EKG was interpreted by the attending.  X-ray of the right hip revealed an intratrochanteric fracture.  Was able speak with Dr. Wooten who will follow in consultation.  Dr. Gray agreed to admit the patient for further treatment and management.  Patient was in agreement with this plan.       Amount and/or Complexity of Data Reviewed  Clinical lab tests: reviewed and ordered  Tests in the radiology section of CPT®: ordered and reviewed  Discussion of test results with the performing providers: yes  Review and summarize past medical records: yes  Discuss the patient with other providers: yes    Risk of Complications, Morbidity, and/or Mortality  Presenting problems:  moderate  Diagnostic procedures: moderate  Management options: moderate    Patient Progress  Patient progress: stable      Final diagnoses:   Closed nondisplaced intertrochanteric fracture of right femur, initial encounter (CMS/[facility])   Fall, initial encounter       ED Disposition  ED Disposition     ED Disposition Condition Comment    Decision to Admit  Level of Care: Med/Surg [1]   Diagnosis: Closed nondisplaced intertrochanteric fracture of right femur, initial encounter (CMS/[facility]) [340999]   Admitting Physician: VILMA BARGER [[id number]]   Attending Physician: VILMA BARGER [[id number]]   Certification: I Certify That Inpatient Hospital Services Are Medically Necessary For Greater Than 2 Midnights            No follow-up provider specified.       Medication List      No changes were made to your prescriptions during this visit.          Flower Frias PA-C  08/03/21 0029

## 2021-08-03 NOTE — OP NOTE
[address],  [address]  [place]  [address]  [phone]      OPERATIVE REPORT      PATIENT NAME:  Sal Siu                            YOB: 1935       PREOP DIAGNOSIS:   Right hip closed non-displaced femoral neck fracture, Garden Type 2.    POSTOP DIAGNOSIS:   Same    PROCEDURE:    Compression screws fixation of right hip femoral neck fracture.    SURGEON:     Rajan Wooten MD    OPERATIVE TEAM:   Circulator: Rhina Drake RN  Radiology Technologist: Nicole Dickson  Scrub Person: Guy Smith; Brent Ramon    ANESTHETIST:  JOSE: Rayray Lam CRNA    ANESTHESIA:  General    FLUIDS:      1200 ml crystalloid    ESTIM BLOOD LOSS:   25 ml     URINE:      350 ml    FINDINGS:     Mild impacted valgus nondisplaced Garden type 2 femoral neck fracture.    SPECIMENS:    None..    IMPLANTS:     Synthes 7.0 mm diameter cannulated partial threaded compression screws (95 mm, 95 mm, 90 mm)    DRAINS:     Herrera to gravity, then removed at end of procedure.    COMPLICATIONS:    None.    DISPOSITION:    Stable to Recovery.    INDICATIONS:   Painful limiting hip fracture on clinical exam and imaging.    NARRATIVE:    Risks, benefits and alternatives discussed and informed consent for surgical procedure obtained.  Risks discussed including but not limited to anesthesia, infection, nerve/vessel/tendon injury, fracture malunion or nonunion, implant loosening, hip dislocation, morbidities from any chronic medical conditions, DVT, PE and death.  Goals include pain relief, earlier mobilization and rehabilitation to improve ambulatory and functional level.  Patient presents for planned hip fracture stabilization surgery.     Antibiotic prophylaxis was given.  Surgeon site marking and a time out were performed prior to the procedure.  Anesthesia was effective and well tolerated.  The patient was placed onto the fracture table in the supine position with care  taken to pad all areas of the body and observe skin precautions.  Also, care was taken to provide DVT/PE prophylaxis including athrombic protocol.  The away leg was kept in a comfortable flexed, abducted position in the padded shannon and to provide room for the C-arm image.  The involved leg was gently placed in a padded fracture boot in gentle mild traction.  The hip and leg were prepped and draped in the usual sterile fashion.    After sterile prep and drape, a small incision was made at the proximal lateral aspect of the femur and dissection to the level of the bone.  A parallel aiming 135 degree guide was used and three parallel threaded pins were placed from the proximal lateral aspect of the femur and extending across the femoral neck fracture and into the femoral head in the desired positions.  Care was taken to keep the pins parallel and to prefer the stronger medial calcar bone.  Over the pins, standard depth gauge measurement, drilling and self tapping screw placement was performed.  Care was taken to use the shorter partial threads when indicated to obtain compression at the fracture site.  After final screw placement, final C-arm views were saved showing a good fracture reduction and hardware position in AP and lateral planes.      The small wound was irrigated copiously with antibiotic solution.  Routine closure was performed with 0 Vicryl for the tensor fascia aixa, 2-0 Vicryl for the subcutaneous tissue and staples for the skin.  A sterile xeroform, gauze and Tegaderm dressing was applied.  The patient was gently removed from the fracture table and supine on the hospital bed.  Anesthesia was effective and well tolerated.  There were no complications of the procedure.  The patient was transferred in stable condition to recovery.

## 2021-08-03 NOTE — H&P
[facility]   HISTORY AND PHYSICAL      Name:  Sal Siu   Age:  86 y.o.  Sex:  female  :  1935  MRN:  [id number]   Visit Number:  [id number]  Admission Date:  2021  Date Of Service:  21  Primary Care Physician:  David Barrios MD    Chief Complaint:     Right hip pain after a fall.    History Of Presenting Illness:      Ms. Siu is an 86-year-old female, with history of essential hypertension, GERD, dyslipidemia was brought to the emergency room by EMS after she sustained a fall at home.  Patient apparently was carrying laundry basket in her basement when she accidentally stumbled and fell onto her right hip.  Since then she was unable to stand and started having significant amount of pain in the right hip.  There was no history of loss of consciousness.  She denies any chest pain or shortness of breath.  She does follow-up with Dr. Farias but denies any history of coronary artery disease or atrial fibrillation.    In the emergency room, she was afebrile and hemodynamically stable.  Blood work done in the emergency room including CBC and INR were within normal limits.  Chest x-ray showed aortic calcifications but otherwise unremarkable.  Hip with pelvis x-ray showed right-sided nondisplaced femoral neck fracture.  Dr. Wooten from orthopedic services were consulted and the patient is currently being admitted to the medical floor for further evaluation and management.    Review Of Systems:    All systems were reviewed and negative except for: Right hip pain.    Past Medical History: Patient  has a past medical history of Arthritis, Body piercing, Cataract, right eye, Chronic hypertension, Constipation, Dyslipidemia, GERD (gastroesophageal reflux disease), Glaucoma, migraines, Hyperlipidemia, Hypertensive cardiovascular disease, Incontinence in female, Intermittent palpitations, Obstructive sleep apnea, PONV (postoperative nausea and vomiting), UTI (urinary  tract infection), Wears glasses, and Wears partial dentures.    Past Surgical History: Patient  has a past surgical history that includes Appendectomy (1946); Lipoma Excision; Dilation and curettage of uterus (1970); Lumbar disc surgery (1997); Laparoscopic cholecystectomy; Oophorectomy; Hysterectomy (1971); Colonoscopy; Esophagogastroduodenoscopy; Eye surgery (Bilateral); and Eye capsulotomy (Left, 5/22/2018).    Social History: Patient  reports that she has never smoked. She has never used smokeless tobacco. She reports that she does not drink alcohol and does not use drugs.    Family History: Patient's family history includes Cancer in her father; Heart attack in her father and mother; Hypertension in her mother.    Allergies:      Sulfa antibiotics and Other    Home Medications:    Prior to Admission Medications     Prescriptions Last Dose Informant Patient Reported? Taking?    amLODIPine (NORVASC) 2.5 MG tablet   No No    Take 1 tablet by mouth Daily.    Ascorbic Acid (VITAMIN C PO)   Yes No    Take  by mouth Daily.    atorvastatin (LIPITOR) 40 MG tablet   No No    Take 1 tablet by mouth once daily    Cholecalciferol (VITAMIN D3) 5000 units capsule capsule   No No    Take 1 capsule by mouth Daily.    ciclopirox (LOPROX) 0.77 % cream   No No    Apply  topically to the appropriate area as directed Every Night. Clean nail with alcohol for 7 days prior to application    Cyanocobalamin (VITAMIN B-12 PO)   Yes No    Take  by mouth Daily.    lisinopril (PRINIVIL,ZESTRIL) 40 MG tablet   No No    Take 1 tablet by mouth Daily.    meclizine (ANTIVERT) 12.5 MG tablet   No No    Take 1 tablet by mouth 3 (Three) Times a Day As Needed for Dizziness.    omeprazole (priLOSEC) 20 MG capsule   No No    Take 1 capsule by mouth once daily    torsemide (DEMADEX) 10 MG tablet   No No    TAKE 1 TABLET BY MOUTH ONCE DAILY.  MAY  TAKE  AN  ADDITIONAL  10  MG  TABLET  AS  NEEDED  NO  MORE  THAN  ONCE  A  WEEK.        ED  Medications:    Medications   fentaNYL citrate (PF) (SUBLIMAZE) injection 50 mcg (50 mcg Intravenous Given 8/2/21 2328)   ondansetron (ZOFRAN) injection 4 mg (4 mg Intravenous Given 8/2/21 2221)     Vital Signs:  Temp:  [97.6 °F (36.4 °C)] 97.6 °F (36.4 °C)  Heart Rate:  [76] 76  Resp:  [18] 18  BP: (152)/(64) 152/64        08/02/21 2150   Weight: 67.6 kg (149 lb)     Body mass index is 27.25 kg/m².    Physical Exam:     General Appearance:  Alert and cooperative.   Head:  Atraumatic and normocephalic.   Eyes: Conjunctivae and sclerae normal, no icterus. No pallor.   Ears:  Ears with no abnormalities noted.   Throat: No oral lesions, no thrush, oral mucosa moist.   Neck: Supple, trachea midline, no thyromegaly.   Back:   No kyphoscoliosis present. No tenderness to palpation.   Lungs:   Breath sounds heard bilaterally equally.  No crackles or wheezing. No Pleural rub or bronchial breathing.   Heart:  Normal S1 and S2, no murmur, no gallop, no rub. No JVD.   Abdomen:   Normal bowel sounds, no masses, no organomegaly. Soft, nontender, nondistended, no rebound tenderness.   Extremities: Supple, no edema, no cyanosis, no clubbing.  Tenderness without any swelling or ecchymosis noted on the right hip.  No significant shortening or external rotation noted of the right lower extremity.   Pulses: Pulses palpable bilaterally.   Skin: No bleeding or rash.   Neurologic: Alert and oriented x 3. No facial asymmetry. Moves all four limbs. No tremors.      Laboratory data:    I have reviewed the labs done in the emergency room.    Results from last 7 days   Lab Units 08/02/21  2220   WBC 10*3/mm3 8.57   HEMOGLOBIN g/dL 12.9   HEMATOCRIT % 38.0   PLATELETS 10*3/mm3 183     Results from last 7 days   Lab Units 08/02/21  2220   INR  0.97     EKG:      EKG done in the emergency room was reviewed by me.  It shows sinus rhythm at 69 bpm.  Normal axis.  No significant ST-T changes.  QTc was 459.    Radiology:    Chest x-ray reviewed by  me did not show any infiltrates or cardiomegaly.  Aortic calcifications noted.  X-ray of the right hip was reviewed by me and it shows nondisplaced right femoral neck fracture.  Official reports are currently pending.    Assessment:    1.  Status post fall at home.  2.  Closed right nondisplaced femoral neck fracture, POA.  3.  Essential hypertension.  4.  GERD.    Plan:    Ms. Siu is currently being admitted to the medical floor with telemetry.  She will be placed on morphine for pain control.  I have strongly advised her to use the incentive spirometry while lying down.  We will consult Dr. Wooten from orthopedic services.  She does not have any significant cardiac history and is a low to moderate risk candidate for her right hip surgery.  EKG does not show any evidence of ischemia.    Patient will be continued on her home medications including her blood pressure medications.  She will be placed on Lovenox for DVT prophylaxis.  Her CODE STATUS is full code.  I have discussed the patient's condition with her  and daughter who are at the bedside.    Risk Assessment: Low to moderate.  DVT Prophylaxis: Lovenox.  Code Status: Full.  Diet: Cardiac with n.p.o. after midnight.    Advance Care Planning      ACP discussion was held with the patient during this visit. Patient does not have an advance directive, information provided.      Alex Gray MD  08/02/21  23:30 EDT    Dictated utilizing Dragon dictation.

## 2021-08-03 NOTE — ANESTHESIA POSTPROCEDURE EVALUATION
Patient: Sal Siu    Procedure Summary     Date: 08/03/21 Room / Location: [facility] OR  /  [facility]    Anesthesia Start: 1010 Anesthesia Stop: 1150    Procedure: HIP CANNULATED SCREWS PLACEMENT, RIGHT (Right Hip) Diagnosis:       Fall, initial encounter      Closed traumatic nondisplaced fracture of neck of right femur, initial encounter (CMS/[facility])      (Fall, initial encounter [W19.XXXA])      (Closed traumatic nondisplaced fracture of neck of right femur, initial encounter (CMS/[facility]) [S72.001A])    Surgeons: Pelon Wooten MD Provider: Rayray Lam CRNA    Anesthesia Type: general ASA Status: 3          Anesthesia Type: general    Vitals  Vitals Value Taken Time   /38 08/03/21 1240   Temp 98.6 °F (37 °C) 08/03/21 1240   Pulse 73 08/03/21 1244   Resp 14 08/03/21 1240   SpO2 94 % 08/03/21 1244   Vitals shown include unvalidated device data.        Post Anesthesia Care and Evaluation    Patient location during evaluation: PACU  Patient participation: complete - patient participated  Level of consciousness: awake  Pain score: 3  Pain management: adequate  Airway patency: patent  Anesthetic complications: No anesthetic complications  PONV Status: controlled  Cardiovascular status: acceptable and stable  Respiratory status: acceptable and face mask  Hydration status: acceptable

## 2021-08-03 NOTE — ANESTHESIA PROCEDURE NOTES
Peripheral Block      Patient reassessed immediately prior to procedure    Reason for block: at surgeon's request and post-op pain management  Performed by  CRNA: Rayray Lam CRNA  Preanesthetic Checklist  Completed: patient identified, IV checked, site marked, risks and benefits discussed, surgical consent, monitors and equipment checked, pre-op evaluation and timeout performed  Prep:  Pt Position: supine  Sterile barriers:cap, gloves and mask  Prep: ChloraPrep  Patient monitoring: EKG, continuous pulse oximetry and blood pressure monitoring  Procedure  Sedation:yes    Guidance:ultrasound guided  Images:still images obtained    Laterality:right  Block Type:fascia iliaca compartment  Injection Technique:single-shot  Needle Type:echogenic  Needle Gauge:21 G      Medications Used: ropivacaine (NAROPIN) injection 0.5 %, 30 mL      Medications  Preservative Free Saline:30ml    Post Assessment  Injection Assessment: negative aspiration for heme, no paresthesia on injection and incremental injection  Patient Tolerance:comfortable throughout block  Complications:no  Additional Notes  Procedure:          FASCIA ILIACA BLOCK                                Patient analgesia was achieved with General Anesthesia      Pt was placed in the supine position.   The insertion site was prepped in sterile fashion with Chloraprep.  A BBraun echogenic needle was advance In-plane under ultrasound guidance. The Anterior superior Iliac crest was initially visualized and the probe was directed slightly medially and slightly towards the umbilicus.  The course of the needle was tracked over the sartorius muscle through the fascia Iliacus and into the anterior portion of the Iliacus muscle.  Major vessels where identified and avoided as were structures of the peritoneal cavity.  LA injection was made incrementally in 1-5 ml amounts and spread was visualized superiorly below the fascia iliacus.  Injection was completed with  negative aspiration of blood and negative intravascular injection.  Injection pressures were normal or minimal resistance.

## 2021-08-03 NOTE — CASE MANAGEMENT/SOCIAL WORK
Continued Stay Note  ANNETTE De Leon     Patient Name: Sal Siu  MRN: [id number]  Today's Date: 8/3/2021    Admit Date: 8/2/2021    Discharge Plan     Row Name 08/03/21 1358       Plan    Plan  Spoke to pt she was drowsy after surgery today will need to follow tomorrow after she has had therapy        Discharge Codes    No documentation.       Expected Discharge Date and Time     Expected Discharge Date Expected Discharge Time    Aug 5, 2021             Kae Tenorio RN

## 2021-08-03 NOTE — PLAN OF CARE
Problem: Adult Inpatient Plan of Care  Goal: Plan of Care Review  Outcome: Ongoing, Progressing  Flowsheets (Taken 8/3/2021 0027)  Plan of Care Reviewed With: patient  Outcome Summary: NEW ADMISSION,FX RIGHT HIP,PAIN CONTROL.   Goal Outcome Evaluation:  Plan of Care Reviewed With: patient           Outcome Summary: NEW ADMISSION,FX RIGHT HIP,PAIN CONTROL.

## 2021-08-03 NOTE — PROGRESS NOTES
[facility]    Name:  Sal Siu   Age:  86 y.o.  Sex:  female  :  1935  MRN:  [id number]   Visit Number:  [id number]  Admission Date:  2021  Date Of Service:  21  Primary Care Physician:  David Barrios MD     LOS: 1 day :    Chief Complaint:      Follow-up hip pain    Subjective:    Patient seen at bedside.  She underwent femur fracture surgery with Dr. Wooten today.  Slightly nauseous.  Family at bedside.  Denies any significant pain currently, she does not want to take much pain medicine, does not want to get hooked on it.  She also tells me she wants to go to [facility] for rehab.  Case management notified    Hospital Course:    Patient is a pleasant 86-year-old female who had presented via EMS after sustaining a fall in her basement while carrying laundry basket on 2021.  Medical history includes hypertension, GERD, dyslipidemia.  She had evidence of a right femoral neck fracture with orthopedics consulted.  Patient underwent operative intervention with screw fixation on 8/3/2021 with Dr. Wooten.    Review of Systems:     All systems were reviewed and negative except as mentioned in subjective, assessment and plan.    Vital Signs:    Temp:  [97.6 °F (36.4 °C)-99.4 °F (37.4 °C)] 97.8 °F (36.6 °C)  Heart Rate:  [68-79] 72  Resp:  [12-20] 14  BP: (132-157)/(38-64) 139/49    Intake and output:    No intake/output data recorded.  I/O this shift:  In: 1300 [I.V.:1300]  Out: 275 [Urine:250; Blood:25]    Physical Examination:    General Appearance:  Alert and cooperative.  No acute distress.  Slightly uncomfortable, elderly appearing   Head:  Atraumatic and normocephalic.   Eyes: Conjunctivae and sclerae normal, no icterus. No pallor.   Throat: No oral lesions, no thrush, oral mucosa moist.   Neck: Supple, trachea midline, no thyromegaly.   Lungs:   Breath sounds heard bilaterally equally.  No crackles or wheezing. No Pleural rub or  bronchial breathing.   Heart:  Normal S1 and S2, no murmur, no gallop, no rub. No JVD.   Abdomen:   Normal bowel sounds, no masses, no organomegaly. Soft, nontender, nondistended, no rebound tenderness.   Extremities:  Right hip bandage appears intact.  There is mild edema noted.  No surrounding erythema.  No significant bruising.  Pulses intact distally at the DP/PT.   Skin: No bleeding or rash.   Neurologic: Alert and oriented x 3. No facial asymmetry. Moves all four limbs. No tremors.      Laboratory results:    Results from last 7 days   Lab Units 08/03/21  0631 08/02/21  2336   SODIUM mmol/L 138 139   POTASSIUM mmol/L 4.1 3.8   CHLORIDE mmol/L 107 105   CO2 mmol/L 23.5 22.1   BUN mg/dL 16 19   CREATININE mg/dL 0.62 0.60   CALCIUM mg/dL 9.1 9.2   BILIRUBIN mg/dL  --  0.5   ALK PHOS U/L  --  91   ALT (SGPT) U/L  --  17   AST (SGOT) U/L  --  15   GLUCOSE mg/dL 138* 138*     Results from last 7 days   Lab Units 08/03/21  0631 08/02/21  2220   WBC 10*3/mm3 11.89* 8.57   HEMOGLOBIN g/dL 11.8* 12.9   HEMATOCRIT % 35.0 38.0   PLATELETS 10*3/mm3 168 183     Results from last 7 days   Lab Units 08/02/21  2220   INR  0.97     Results from last 7 days   Lab Units 08/03/21  0631 08/02/21  2336   TROPONIN T ng/mL <0.010 <0.010             I have reviewed the patient's laboratory results.    Radiology results:    XR Chest 1 View    Result Date: 8/3/2021  PROCEDURE: XR CHEST 1 VW    HISTORY: Fall, medical clearance  COMPARISON: None.  FINDINGS: The heart is normal in size. The mediastinum is unremarkable. The lungs are clear. There is no pneumothorax. There are no acute osseous abnormalities.      Impression: No acute cardiopulmonary process.      PROCEDURE: XR HIP W OR WO PELVIS 2-3 VIEW RIGHT-  HISTORY: Fall, medical clearance  COMPARISON: September 2017.  FINDINGS:  A 2 view exam demonstrates a right femoral neck fracture. The joint spaces are preserved.  No soft tissue abnormality is seen.  IMPRESSION: Right femoral neck  fracture.     Images were reviewed, interpreted, and dictated by Dr. Jonathan Connors M.D. Transcribed by Carla Beauchamp PA-C.  This report was finalized on 8/3/2021 12:42 PM by Jonathan Connors M.D..    Peripheral Block    Result Date: 8/3/2021  Rayray Lam CRNA     8/3/2021 11:50 AM Peripheral Block Patient reassessed immediately prior to procedure Reason for block: at surgeon's request and post-op pain management Performed by CRNA: Rayray Lam CRNA Preanesthetic Checklist Completed: patient identified, IV checked, site marked, risks and benefits discussed, surgical consent, monitors and equipment checked, pre-op evaluation and timeout performed Prep: Pt Position: supine Sterile barriers:cap, gloves and mask Prep: ChloraPrep Patient monitoring: EKG, continuous pulse oximetry and blood pressure monitoring Procedure Sedation:yes Guidance:ultrasound guided Images:still images obtained Laterality:right Block Type:fascia iliaca compartment Injection Technique:single-shot Needle Type:echogenic Needle Gauge:21 G Medications Used: ropivacaine (NAROPIN) injection 0.5 %, 30 mL Medications Preservative Free Saline:30ml Post Assessment Injection Assessment: negative aspiration for heme, no paresthesia on injection and incremental injection Patient Tolerance:comfortable throughout block Complications:no Additional Notes Procedure:          FASCIA ILIACA BLOCK                             Patient analgesia was achieved with General Anesthesia   Pt was placed in the supine position.   The insertion site was prepped in sterile fashion with Chloraprep.  A BBraun echogenic needle was advance In-plane under ultrasound guidance. The Anterior superior Iliac crest was initially visualized and the probe was directed slightly medially and slightly towards the umbilicus.  The course of the needle was tracked over the sartorius muscle through the fascia Iliacus and into the anterior portion of the Iliacus  muscle.  Major vessels where identified and avoided as were structures of the peritoneal cavity.  LA injection was made incrementally in 1-5 ml amounts and spread was visualized superiorly below the fascia iliacus.  Injection was completed with negative aspiration of blood and negative intravascular injection.  Injection pressures were normal or minimal resistance.     FL C Arm During Surgery    Result Date: 8/3/2021  This procedure was auto-finalized with no dictation required.    XR Hip With or Without Pelvis 2 - 3 View Right    Result Date: 8/3/2021  PROCEDURE: XR CHEST 1 VW    HISTORY: Fall, medical clearance  COMPARISON: None.  FINDINGS: The heart is normal in size. The mediastinum is unremarkable. The lungs are clear. There is no pneumothorax. There are no acute osseous abnormalities.      Impression: No acute cardiopulmonary process.      PROCEDURE: XR HIP W OR WO PELVIS 2-3 VIEW RIGHT-  HISTORY: Fall, medical clearance  COMPARISON: September 2017.  FINDINGS:  A 2 view exam demonstrates a right femoral neck fracture. The joint spaces are preserved.  No soft tissue abnormality is seen.  IMPRESSION: Right femoral neck fracture.     Images were reviewed, interpreted, and dictated by Dr. Jonathan Connors M.D. Transcribed by Carla Beauchamp PA-C.  This report was finalized on 8/3/2021 12:42 PM by Jonathan Connors M.D..    I have reviewed the patient's radiology reports.    Medication Review:     I have reviewed the patient's active and prn medications.     Problem List:      Benign essential HTN    Mixed hyperlipidemia    Status post fall    Fall    Traumatic closed nondisplaced fracture of neck of right femur (CMS/HCC)      Assessment:    1.  Status post fall at home.  2.  Closed right nondisplaced femoral neck fracture, POA.  3.  Essential hypertension.  4.  GERD.    Plan:    Patient is hemodynamically stable.  Labs are overall stable.  Physical therapy/OT evaluations pending.  As needed antiemetics with  Zofran and Reglan for now.  Suspect related to anesthesia.  As needed pain control.  Continue with Lovenox for VT prophylaxis.  Monitor urine output, she did have a temporary Herrera catheter at time of surgery which is removed.  Continue with home medications as appropriate.  Plan will be for rehab at [facility] upon discharge per family.    DVT Prophylaxis: Lovenox  Code Status: Full  Diet: As tolerated  Discharge Plan: 2 days to rehab    Ale Leija DO  08/03/21  15:24 EDT    Dictated utilizing Dragon dictation.

## 2021-08-03 NOTE — THERAPY EVALUATION
Pt requested Pt attempt evaluation tomorrow when she is more alert and not as fatigued. PTwill f/u with Pt tomorrow morning.

## 2021-08-03 NOTE — DISCHARGE PLACEMENT REQUEST
"  Kae Tenorio -[phone] fax [phone]  Sal Siu (86 y.o. Female)     Date of Birth Social Security Number Address Home Phone MRN    1935  [address] [place] [address] [phone] [id number]    Sikhism Marital Status          None        Admission Date Admission Type Admitting Provider Attending Provider Department, Room/Bed    8/2/21 Emergency Alex Gray MD Karrick, Shandy Marcus, DO [facility] MED SURG  4, 423/1    Discharge Date Discharge Disposition Discharge Destination                       Attending Provider: Ale Leija DO    Allergies: Sulfa Antibiotics, Other    Isolation: None   Infection: None   Code Status: CPR    Ht: 157.5 cm (62\")   Wt: 69.2 kg (152 lb 8.9 oz)    Admission Cmt: None   Principal Problem: Closed nondisplaced intertrochanteric fracture of right femur (CMS/[facility]) [S72.144A]                 Active Insurance as of 8/2/2021     Primary Coverage     Payor Plan Insurance Group Employer/Plan Group    MEDICARE MEDICARE A & B      Payor Plan Address Payor Plan Phone Number Payor Plan Fax Number Effective Dates    [address] [phone]  7/1/2000 - None Entered    [facility] [address]       Subscriber Name Subscriber Birth Date Member ID       SAL SIU R 1935 [id number]           Secondary Coverage     Payor Plan Insurance Group Employer/Plan Group    [employer] [place] [employer] [place] [facility]     Payor Plan Address Payor Plan Phone Number Payor Plan Fax Number Effective Dates    [address] [place] [address] [phone]  7/6/2000 - None Entered    [place] [address]       Subscriber Name Subscriber Birth Date Member ID       SAL SIU R 1935 [id number]                 Emergency Contacts      (Rel.) Home Phone Work Phone Mobile Phone    Rose Zacarias (Daughter) [phone] -- --    Kishore Siu (Spouse) [phone] -- --               History & Physical      Alex Gray MD at 08/02/21 2330      "         [facility]   HISTORY AND PHYSICAL      Name:  Sal Siu   Age:  86 y.o.  Sex:  female  :  1935  MRN:  [id number]   Visit Number:  [id number]  Admission Date:  2021  Date Of Service:  21  Primary Care Physician:  David Barrios MD    Chief Complaint:     Right hip pain after a fall.    History Of Presenting Illness:      Ms. Siu is an 86-year-old female, with history of essential hypertension, GERD, dyslipidemia was brought to the emergency room by EMS after she sustained a fall at home.  Patient apparently was carrying laundry basket in her basement when she accidentally stumbled and fell onto her right hip.  Since then she was unable to stand and started having significant amount of pain in the right hip.  There was no history of loss of consciousness.  She denies any chest pain or shortness of breath.  She does follow-up with Dr. Farias but denies any history of coronary artery disease or atrial fibrillation.    In the emergency room, she was afebrile and hemodynamically stable.  Blood work done in the emergency room including CBC and INR were within normal limits.  Chest x-ray showed aortic calcifications but otherwise unremarkable.  Hip with pelvis x-ray showed right-sided nondisplaced femoral neck fracture.  Dr. Wooten from orthopedic services were consulted and the patient is currently being admitted to the medical floor for further evaluation and management.    Review Of Systems:    All systems were reviewed and negative except for: Right hip pain.    Past Medical History: Patient  has a past medical history of Arthritis, Body piercing, Cataract, right eye, Chronic hypertension, Constipation, Dyslipidemia, GERD (gastroesophageal reflux disease), Glaucoma, migraines, Hyperlipidemia, Hypertensive cardiovascular disease, Incontinence in female, Intermittent palpitations, Obstructive sleep apnea, PONV (postoperative nausea and vomiting), UTI (urinary  tract infection), Wears glasses, and Wears partial dentures.    Past Surgical History: Patient  has a past surgical history that includes Appendectomy (1946); Lipoma Excision; Dilation and curettage of uterus (1970); Lumbar disc surgery (1997); Laparoscopic cholecystectomy; Oophorectomy; Hysterectomy (1971); Colonoscopy; Esophagogastroduodenoscopy; Eye surgery (Bilateral); and Eye capsulotomy (Left, 5/22/2018).    Social History: Patient  reports that she has never smoked. She has never used smokeless tobacco. She reports that she does not drink alcohol and does not use drugs.    Family History: Patient's family history includes Cancer in her father; Heart attack in her father and mother; Hypertension in her mother.    Allergies:      Sulfa antibiotics and Other    Home Medications:    Prior to Admission Medications     Prescriptions Last Dose Informant Patient Reported? Taking?    amLODIPine (NORVASC) 2.5 MG tablet   No No    Take 1 tablet by mouth Daily.    Ascorbic Acid (VITAMIN C PO)   Yes No    Take  by mouth Daily.    atorvastatin (LIPITOR) 40 MG tablet   No No    Take 1 tablet by mouth once daily    Cholecalciferol (VITAMIN D3) 5000 units capsule capsule   No No    Take 1 capsule by mouth Daily.    ciclopirox (LOPROX) 0.77 % cream   No No    Apply  topically to the appropriate area as directed Every Night. Clean nail with alcohol for 7 days prior to application    Cyanocobalamin (VITAMIN B-12 PO)   Yes No    Take  by mouth Daily.    lisinopril (PRINIVIL,ZESTRIL) 40 MG tablet   No No    Take 1 tablet by mouth Daily.    meclizine (ANTIVERT) 12.5 MG tablet   No No    Take 1 tablet by mouth 3 (Three) Times a Day As Needed for Dizziness.    omeprazole (priLOSEC) 20 MG capsule   No No    Take 1 capsule by mouth once daily    torsemide (DEMADEX) 10 MG tablet   No No    TAKE 1 TABLET BY MOUTH ONCE DAILY.  MAY  TAKE  AN  ADDITIONAL  10  MG  TABLET  AS  NEEDED  NO  MORE  THAN  ONCE  A  WEEK.        ED  Medications:    Medications   fentaNYL citrate (PF) (SUBLIMAZE) injection 50 mcg (50 mcg Intravenous Given 8/2/21 2328)   ondansetron (ZOFRAN) injection 4 mg (4 mg Intravenous Given 8/2/21 2221)     Vital Signs:  Temp:  [97.6 °F (36.4 °C)] 97.6 °F (36.4 °C)  Heart Rate:  [76] 76  Resp:  [18] 18  BP: (152)/(64) 152/64        08/02/21 2150   Weight: 67.6 kg (149 lb)     Body mass index is 27.25 kg/m².    Physical Exam:     General Appearance:  Alert and cooperative.   Head:  Atraumatic and normocephalic.   Eyes: Conjunctivae and sclerae normal, no icterus. No pallor.   Ears:  Ears with no abnormalities noted.   Throat: No oral lesions, no thrush, oral mucosa moist.   Neck: Supple, trachea midline, no thyromegaly.   Back:   No kyphoscoliosis present. No tenderness to palpation.   Lungs:   Breath sounds heard bilaterally equally.  No crackles or wheezing. No Pleural rub or bronchial breathing.   Heart:  Normal S1 and S2, no murmur, no gallop, no rub. No JVD.   Abdomen:   Normal bowel sounds, no masses, no organomegaly. Soft, nontender, nondistended, no rebound tenderness.   Extremities: Supple, no edema, no cyanosis, no clubbing.  Tenderness without any swelling or ecchymosis noted on the right hip.  No significant shortening or external rotation noted of the right lower extremity.   Pulses: Pulses palpable bilaterally.   Skin: No bleeding or rash.   Neurologic: Alert and oriented x 3. No facial asymmetry. Moves all four limbs. No tremors.      Laboratory data:    I have reviewed the labs done in the emergency room.    Results from last 7 days   Lab Units 08/02/21  2220   WBC 10*3/mm3 8.57   HEMOGLOBIN g/dL 12.9   HEMATOCRIT % 38.0   PLATELETS 10*3/mm3 183     Results from last 7 days   Lab Units 08/02/21  2220   INR  0.97     EKG:      EKG done in the emergency room was reviewed by me.  It shows sinus rhythm at 69 bpm.  Normal axis.  No significant ST-T changes.  QTc was 459.    Radiology:    Chest x-ray reviewed by  me did not show any infiltrates or cardiomegaly.  Aortic calcifications noted.  X-ray of the right hip was reviewed by me and it shows nondisplaced right femoral neck fracture.  Official reports are currently pending.    Assessment:    1.  Status post fall at home.  2.  Closed right nondisplaced femoral neck fracture, POA.  3.  Essential hypertension.  4.  GERD.    Plan:    Ms. Siu is currently being admitted to the medical floor with telemetry.  She will be placed on morphine for pain control.  I have strongly advised her to use the incentive spirometry while lying down.  We will consult Dr. Wooten from orthopedic services.  She does not have any significant cardiac history and is a low to moderate risk candidate for her right hip surgery.  EKG does not show any evidence of ischemia.    Patient will be continued on her home medications including her blood pressure medications.  She will be placed on Lovenox for DVT prophylaxis.  Her CODE STATUS is full code.  I have discussed the patient's condition with her  and daughter who are at the bedside.    Risk Assessment: Low to moderate.  DVT Prophylaxis: Lovenox.  Code Status: Full.  Diet: Cardiac with n.p.o. after midnight.    Advance Care Planning      ACP discussion was held with the patient during this visit. Patient does not have an advance directive, information provided.      Alex Gray MD  08/02/21  23:30 EDT    Dictated utilizing Dragon dictation.    Electronically signed by Alex Gray MD at 08/03/21 0008       Physician Progress Notes (last 24 hours) (Notes from 08/02/21 1455 through 08/03/21 1455)    No notes of this type exist for this encounter.            Consult Notes (last 24 hours) (Notes from 08/02/21 1455 through 08/03/21 1455)      Pelon Wooten MD at 08/03/21 3002      Consult Orders    1. Inpatient Orthopedic Surgery Consult [[id number]] ordered by Alex Gray MD at 08/02/21 2391               [facility]    HISTORY AND PHYSICAL      Name:  Sal Siu   Age:  86 y.o.  Sex:  female  :  1935  MRN:  [id number]   Visit Number:  [id number]  Admission Date:  2021  Date Of Service:  21  Primary Care Physician:  David Barrios MD    Chief Complaint:     Acute right hip pain after mechanical fall.    History Of Presenting Illness:      86 year old woman with medical history including hypertension, cardiac disease, dyslipidemia, sleep apnea, GERD, UTI's and osteoarthritis that describes that she fell yesterday late in the afternoon when in her basement carrying a laundry basket.  She fell onto a concrete basement floor and had immediate right hip pain.  No reported head trauma or loss of consciousness.  She could not stand up or bear weight with the right leg.  No other injuries reported.  Patient was brought by EMS to [facility] ER where exam and imaging shows a right hip Garden type 2 femur neck fracture.  Patient was admitted to the hospitalist service late last night, and orthopaedic surgery consulted for evaluation and management of the right hip fracture.       Review Of Systems:    General ROS: No fever, chills or loss of consciousness.  Psychological ROS:  No confusion, no acute cognitive change.  Ophthalmic ROS: No acute visual change.  History of right cataract.  ENT ROS: No acute sore throat, nasal congestion.   Allergy and Immunology ROS: No rash.  Hematological and Lymphatic ROS: Not on formal anticoagulation.  Respiratory ROS: No acute shortness of breath.  Cardiovascular ROS: No reported acute chest pain or tightness.  Gastrointestinal ROS: No acute abdominal pain.  History of chronic constipation.  Plan bowel regimen as appropriate.  Genito-Urinary ROS: No dysuria.   History of UTI's.  UA ordered.  Musculoskeletal ROS: Chronic back pain. History of lumbar disc surgery .  Acute right hip pain.  No focal calf pain.  Neurological ROS: No acute focal motor deficit. Right thigh and leg weak due  to injury and fracture. No acute loss of sensation.  Dermatological ROS: No redness, rash or pruritis.  No open wound.     Past Medical History:    Past Medical History:   Diagnosis Date   • Arthritis    • Body piercing     EARS ONLY   • Cataract, right eye    • Chronic hypertension    • Constipation    • Dyslipidemia    • GERD (gastroesophageal reflux disease)    • Glaucoma    • Hx of migraines    • Hyperlipidemia    • Hypertensive cardiovascular disease    • Incontinence in female    • Intermittent palpitations    • Obstructive sleep apnea     DOES NOT USE BPAP/ CPAP   • PONV (postoperative nausea and vomiting)    • UTI (urinary tract infection)    • Wears glasses    • Wears partial dentures     LOWER        Past Surgical history:    Past Surgical History:   Procedure Laterality Date   • APPENDECTOMY  1946   • COLONOSCOPY     • DILATATION AND CURETTAGE  1970   • ENDOSCOPY     • EYE CAPSULOTOMY WITH LASER Left 5/22/2018    Procedure: EYE CAPSULOTOMY WITH LASER LEFT;  Surgeon: Meseret Simmons MD;  Location: [facility];  Service: Ophthalmology   • EYE SURGERY Bilateral     CATARACTS REMOVED   • HYSTERECTOMY  1971    COMPLETE   • LAPAROSCOPIC CHOLECYSTECTOMY     • LIPOMA EXCISION      Lower back   • LUMBAR DISC SURGERY  1997   • OOPHORECTOMY         Social History:    Social History     Socioeconomic History   • Marital status:      Spouse name: Not on file   • Number of children: Not on file   • Years of education: Not on file   • Highest education level: Not on file   Tobacco Use   • Smoking status: Never Smoker   • Smokeless tobacco: Never Used   Substance and Sexual Activity   • Alcohol use: No   • Drug use: No   • Sexual activity: Defer       Family History:    Family History   Problem Relation Age of Onset   • Heart attack Mother    • Hypertension Mother    • Heart attack Father    • Cancer Father        Allergies:      Sulfa antibiotics and Other    Home Medications:    Prior to Admission  Medications     Prescriptions Last Dose Informant Patient Reported? Taking?    amLODIPine (NORVASC) 2.5 MG tablet 8/2/2021  No Yes    Take 1 tablet by mouth Daily.    atorvastatin (LIPITOR) 40 MG tablet 8/2/2021  No Yes    Take 1 tablet by mouth once daily    Cholecalciferol (VITAMIN D3) 5000 units capsule capsule 8/2/2021  No Yes    Take 1 capsule by mouth Daily.    ciclopirox (LOPROX) 0.77 % cream 8/2/2021  No Yes    Apply  topically to the appropriate area as directed Every Night. Clean nail with alcohol for 7 days prior to application    Cyanocobalamin (VITAMIN B-12 PO) 8/2/2021  Yes Yes    Take  by mouth Daily.    lisinopril (PRINIVIL,ZESTRIL) 40 MG tablet 8/2/2021  No Yes    Take 1 tablet by mouth Daily.    meclizine (ANTIVERT) 12.5 MG tablet Past Month  No Yes    Take 1 tablet by mouth 3 (Three) Times a Day As Needed for Dizziness.    omeprazole (priLOSEC) 20 MG capsule 8/2/2021  No Yes    Take 1 capsule by mouth once daily    torsemide (DEMADEX) 10 MG tablet Past Month  No Yes    TAKE 1 TABLET BY MOUTH ONCE DAILY.  MAY  TAKE  AN  ADDITIONAL  10  MG  TABLET  AS  NEEDED  NO  MORE  THAN  ONCE  A  WEEK.             ED Medications:    Medications   amLODIPine (NORVASC) tablet 2.5 mg (2.5 mg Oral Given 8/3/21 0812)   atorvastatin (LIPITOR) tablet 40 mg (40 mg Oral Given 8/3/21 0812)   lisinopril (PRINIVIL,ZESTRIL) tablet 40 mg (40 mg Oral Given 8/3/21 0812)   pantoprazole (PROTONIX) EC tablet 40 mg (40 mg Oral Not Given 8/3/21 0600)   sodium chloride 0.9 % flush 3 mL (3 mL Intravenous Given 8/3/21 0812)   sodium chloride 0.9 % flush 3-10 mL (has no administration in time range)   acetaminophen (TYLENOL) tablet 650 mg (has no administration in time range)     Or   acetaminophen (TYLENOL) 160 MG/5ML solution 650 mg (has no administration in time range)     Or   acetaminophen (TYLENOL) suppository 650 mg (has no administration in time range)   ondansetron (ZOFRAN) injection 4 mg (4 mg Intravenous Given 8/3/21 0124)  "  Pharmacy to Dose enoxaparin (LOVENOX) (has no administration in time range)   sodium chloride 0.9 % infusion (100 mL/hr Intravenous New Bag 8/3/21 0118)   Morphine sulfate (PF) injection 2 mg (2 mg Intravenous Given 8/3/21 0124)   enoxaparin (LOVENOX) syringe 40 mg (40 mg Subcutaneous Not Given 8/3/21 0556)   ondansetron (ZOFRAN) injection 4 mg (4 mg Intravenous Given 8/2/21 2221)       Vital Signs:    Temp:  [97.6 °F (36.4 °C)-98 °F (36.7 °C)] 97.9 °F (36.6 °C)  Heart Rate:  [75-79] 75  Resp:  [17-20] 17  BP: (142-157)/(52-64) 145/52    Vitals:    08/02/21 2150 08/03/21 0036 08/03/21 0347 08/03/21 0736   BP: 152/64 157/56 142/54 145/52   BP Location: Left arm Right arm Right arm Right arm   Patient Position: Lying Lying Lying Lying   Pulse: 76 79 76 75   Resp: 18 20 18 17   Temp: 97.6 °F (36.4 °C) 97.7 °F (36.5 °C) 98 °F (36.7 °C) 97.9 °F (36.6 °C)   TempSrc: Oral Temporal Temporal Oral   SpO2: 96% 90% 94% 95%   Weight: 67.6 kg (149 lb) 69.2 kg (152 lb 8.9 oz)     Height: 157.5 cm (62\") 157.5 cm (62\")             08/02/21 2150 08/03/21  0036   Weight: 67.6 kg (149 lb) 69.2 kg (152 lb 8.9 oz)       Body mass index is 27.9 kg/m².    No intake or output data in the 24 hours ending 08/03/21 0828    Physical Exam:    General Appearance:    Alert and cooperative, no acute distress.   Head:    Atraumatic and normocephalic.   Eyes:          Extra-ocular movements are within normal limits.   Back:     No acute midline spine pain.   Lungs:     Breath sounds heard bilaterally.  No active wheezing.    Heart:    Regular rate and rhythm.   Abdomen:     Soft, non-distended, no guarding.   Extremities:   Moves all extremities, right hip and leg limited mobility due to pain and fracture.   Pulses:   Pulses palpable, no cyanosis.   Skin:   No rash, no open wound.   Neurologic:   Motor and sensation grossly intact.     Labs:    Lab Results (last 24 hours)     Procedure Component Value Units Date/Time    Troponin [[id number]]  " (Normal) Collected: 08/03/21 0631    Specimen: Blood Updated: 08/03/21 0734     Troponin T <0.010 ng/mL     Narrative:      Troponin T Reference Range:  <= 0.03 ng/mL-   Negative for AMI  >0.03 ng/mL-     Abnormal for myocardial necrosis.  Clinicians would have to utilize clinical acumen, EKG, Troponin and serial changes to determine if it is an Acute Myocardial Infarction or myocardial injury due to an underlying chronic condition.       Results may be falsely decreased if patient taking Biotin.      Basic Metabolic Panel [[id number]]  (Abnormal) Collected: 08/03/21 0631    Specimen: Blood Updated: 08/03/21 0734     Glucose 138 mg/dL      BUN 16 mg/dL      Creatinine 0.62 mg/dL      Sodium 138 mmol/L      Potassium 4.1 mmol/L      Chloride 107 mmol/L      CO2 23.5 mmol/L      Calcium 9.1 mg/dL      eGFR Non African Amer 91 mL/min/1.73      BUN/Creatinine Ratio 25.8     Anion Gap 7.5 mmol/L     Narrative:      GFR Normal >60  Chronic Kidney Disease <60  Kidney Failure <15      CBC Auto Differential [[id number]]  (Abnormal) Collected: 08/03/21 0631    Specimen: Blood Updated: 08/03/21 0727     WBC 11.89 10*3/mm3      RBC 3.82 10*6/mm3      Hemoglobin 11.8 g/dL      Hematocrit 35.0 %      MCV 91.6 fL      MCH 30.9 pg      MCHC 33.7 g/dL      RDW 12.2 %      RDW-SD 40.7 fl      MPV 12.6 fL      Platelets 168 10*3/mm3      Neutrophil % 79.7 %      Lymphocyte % 12.1 %      Monocyte % 7.2 %      Eosinophil % 0.2 %      Basophil % 0.3 %      Immature Grans % 0.5 %      Neutrophils, Absolute 9.47 10*3/mm3      Lymphocytes, Absolute 1.44 10*3/mm3      Monocytes, Absolute 0.86 10*3/mm3      Eosinophils, Absolute 0.02 10*3/mm3      Basophils, Absolute 0.04 10*3/mm3      Immature Grans, Absolute 0.06 10*3/mm3      nRBC 0.0 /100 WBC     COVID PRE-OP / PRE-PROCEDURE SCREENING ORDER (NO ISOLATION) - Swab, Nasal Cavity [[id number]]  (Normal) Collected: 08/03/21 0016    Specimen: Swab from Nasal Cavity Updated: 08/03/21 0057     Narrative:      The following orders were created for panel order COVID PRE-OP / PRE-PROCEDURE SCREENING ORDER (NO ISOLATION) - Swab, Nasal Cavity.  Procedure                               Abnormality         Status                     ---------                               -----------         ------                     COVID-19,[facility] [employer] IN-JAS[facility]ER...[[id number]]  Normal              Final result                 Please view results for these tests on the individual orders.    COVID-19,[facility] [employer] IN-HOUSE,Nasal Swab No Transport Media 3-4 HR TAT - Swab, Nasal Cavity [[id number]]  (Normal) Collected: 08/03/21 0016    Specimen: Swab from Nasal Cavity Updated: 08/03/21 0057     COVID19 Not Detected    Narrative:      Fact sheet for providers: https://www.fda.gov/media/179291/download     Fact sheet for patients: https://www.fda.gov/media/797820/download    Test performed by PCR.    Consider negative results in combination with clinical observations, patient history, and epidemiological information.    Troponin [[id number]]  (Normal) Collected: 08/02/21 2336    Specimen: Blood Updated: 08/03/21 0009     Troponin T <0.010 ng/mL     Narrative:      Troponin T Reference Range:  <= 0.03 ng/mL-   Negative for AMI  >0.03 ng/mL-     Abnormal for myocardial necrosis.  Clinicians would have to utilize clinical acumen, EKG, Troponin and serial changes to determine if it is an Acute Myocardial Infarction or myocardial injury due to an underlying chronic condition.       Results may be falsely decreased if patient taking Biotin.      Comprehensive Metabolic Panel [[id number]]  (Abnormal) Collected: 08/02/21 2336    Specimen: Blood Updated: 08/03/21 0007     Glucose 138 mg/dL      BUN 19 mg/dL      Creatinine 0.60 mg/dL      Sodium 139 mmol/L      Potassium 3.8 mmol/L      Chloride 105 mmol/L      CO2 22.1 mmol/L      Calcium 9.2 mg/dL      Total Protein 6.9 g/dL      Albumin 4.30 g/dL      ALT (SGPT) 17 U/L      AST (SGOT) 15 U/L       Alkaline Phosphatase 91 U/L      Total Bilirubin 0.5 mg/dL      eGFR Non African Amer 95 mL/min/1.73      Globulin 2.6 gm/dL      A/G Ratio 1.7 g/dL      BUN/Creatinine Ratio 31.7     Anion Gap 11.9 mmol/L     Narrative:      GFR Normal >60  Chronic Kidney Disease <60  Kidney Failure <15      aPTT [[id number]]  (Normal) Collected: 08/02/21 2220    Specimen: Blood Updated: 08/02/21 2250     PTT 29.8 seconds     Protime-INR [[id number]]  (Normal) Collected: 08/02/21 2220    Specimen: Blood Updated: 08/02/21 2249     Protime 13.3 Seconds      INR 0.97    Narrative:      Suggested INR therapeutic range for stable oral anticoagulant therapy:    Low Intensity therapy:   1.5-2.0  Moderate Intensity therapy:   2.0-3.0  High Intensity therapy:   2.5-4.0    CBC & Differential [[id number]]  (Abnormal) Collected: 08/02/21 2220    Specimen: Blood Updated: 08/02/21 2234    Narrative:      The following orders were created for panel order CBC & Differential.  Procedure                               Abnormality         Status                     ---------                               -----------         ------                     CBC Auto Differential[[id number]]        Abnormal            Final result                 Please view results for these tests on the individual orders.    CBC Auto Differential [[id number]]  (Abnormal) Collected: 08/02/21 2220    Specimen: Blood Updated: 08/02/21 2234     WBC 8.57 10*3/mm3      RBC 4.15 10*6/mm3      Hemoglobin 12.9 g/dL      Hematocrit 38.0 %      MCV 91.6 fL      MCH 31.1 pg      MCHC 33.9 g/dL      RDW 12.2 %      RDW-SD 41.3 fl      MPV 12.6 fL      Platelets 183 10*3/mm3      Neutrophil % 78.4 %      Lymphocyte % 13.4 %      Monocyte % 6.9 %      Eosinophil % 0.4 %      Basophil % 0.5 %      Immature Grans % 0.4 %      Neutrophils, Absolute 6.73 10*3/mm3      Lymphocytes, Absolute 1.15 10*3/mm3      Monocytes, Absolute 0.59 10*3/mm3      Eosinophils, Absolute 0.03 10*3/mm3      Basophils,  Absolute 0.04 10*3/mm3      Immature Grans, Absolute 0.03 10*3/mm3      nRBC 0.0 /100 WBC           Radiology:    Imaging Results (Last 72 Hours)     Procedure Component Value Units Date/Time    XR Hip With or Without Pelvis 2 - 3 View Right [[id number]] Resulted: 08/02/21 2245     Updated: 08/02/21 2246    XR Chest 1 View [[id number]] Resulted: 08/02/21 2245     Updated: 08/02/21 2245          Assessment:      Closed nondisplaced intertrochanteric fracture of right femur (CMS/HCC)    Benign essential HTN    Mixed hyperlipidemia    Status post fall    Orthopaedic Impression:    Closed nondisplaced Garden type 2 femur neck fracture.  Acute accidental mechanical fall.    Plan:     Risks, benefits, and alternative treatments discussed with the patient: [x] Yes [] No    Risk benefits and merits of the proposed surgery were discussed and the patient's questions were answered risks discussed including and not limited to:  Anesthesia reactions  Blood loss and possible transfusion  Infection  Deep venous thrombosis and pulmonary embolus  Nerve, vascular or tendon injury  Fracture  Deformity  Stiffness  Weakness  Prosthesis and implant issues such as loosening, material wear or dislocation  Skin necrosis  Revision surgery or further treatment  Recurrence of problem and condition     Informed consent: [] Signed  [x] To be obtained at hospital  [] Both    Informed consent process, review of hip fracture non-surgical and surgical options, treatments, risks, benefits, merits, aftercare, rehabilitation and anticipated outcome with patient.  Pain control.  NPO.  Bedrest pre-op.  Also per orders and instructions.  Medical optimization for planned hip fracture stabilization surgery.    PLANNED PROCEDURE:  PLACEMENT OF HIP CANNULATED COMPRESSION SCREWS FOR RIGHT FEMUR NECK FRACTURE.    Pelon Wooten MD  08/03/21  08:28 EDT      Electronically signed by Pelon Wooten MD at 08/03/21 1006

## 2021-08-04 LAB
ANION GAP SERPL CALCULATED.3IONS-SCNC: 10.8 MMOL/L (ref 5–15)
BASOPHILS # BLD AUTO: 0.03 10*3/MM3 (ref 0–0.2)
BASOPHILS NFR BLD AUTO: 0.2 % (ref 0–1.5)
BUN SERPL-MCNC: 13 MG/DL (ref 8–23)
BUN/CREAT SERPL: 20.6 (ref 7–25)
CALCIUM SPEC-SCNC: 8.8 MG/DL (ref 8.6–10.5)
CHLORIDE SERPL-SCNC: 105 MMOL/L (ref 98–107)
CO2 SERPL-SCNC: 22.2 MMOL/L (ref 22–29)
CREAT SERPL-MCNC: 0.63 MG/DL (ref 0.57–1)
DEPRECATED RDW RBC AUTO: 42.2 FL (ref 37–54)
EOSINOPHIL # BLD AUTO: 0.02 10*3/MM3 (ref 0–0.4)
EOSINOPHIL NFR BLD AUTO: 0.2 % (ref 0.3–6.2)
ERYTHROCYTE [DISTWIDTH] IN BLOOD BY AUTOMATED COUNT: 12.2 % (ref 12.3–15.4)
GFR SERPL CREATININE-BSD FRML MDRD: 90 ML/MIN/1.73
GLUCOSE SERPL-MCNC: 117 MG/DL (ref 65–99)
HCT VFR BLD AUTO: 33.1 % (ref 34–46.6)
HGB BLD-MCNC: 11.1 G/DL (ref 12–15.9)
IMM GRANULOCYTES # BLD AUTO: 0.05 10*3/MM3 (ref 0–0.05)
IMM GRANULOCYTES NFR BLD AUTO: 0.4 % (ref 0–0.5)
LYMPHOCYTES # BLD AUTO: 1.36 10*3/MM3 (ref 0.7–3.1)
LYMPHOCYTES NFR BLD AUTO: 10.6 % (ref 19.6–45.3)
MCH RBC QN AUTO: 31.2 PG (ref 26.6–33)
MCHC RBC AUTO-ENTMCNC: 33.5 G/DL (ref 31.5–35.7)
MCV RBC AUTO: 93 FL (ref 79–97)
MONOCYTES # BLD AUTO: 1 10*3/MM3 (ref 0.1–0.9)
MONOCYTES NFR BLD AUTO: 7.8 % (ref 5–12)
NEUTROPHILS NFR BLD AUTO: 10.43 10*3/MM3 (ref 1.7–7)
NEUTROPHILS NFR BLD AUTO: 80.8 % (ref 42.7–76)
NRBC BLD AUTO-RTO: 0 /100 WBC (ref 0–0.2)
PLATELET # BLD AUTO: 150 10*3/MM3 (ref 140–450)
PMV BLD AUTO: 12.7 FL (ref 6–12)
POTASSIUM SERPL-SCNC: 3.7 MMOL/L (ref 3.5–5.2)
RBC # BLD AUTO: 3.56 10*6/MM3 (ref 3.77–5.28)
SODIUM SERPL-SCNC: 138 MMOL/L (ref 136–145)
WBC # BLD AUTO: 12.89 10*3/MM3 (ref 3.4–10.8)

## 2021-08-04 PROCEDURE — 25010000003 CEFAZOLIN SODIUM-DEXTROSE 2-3 GM-%(50ML) RECONSTITUTED SOLUTION: Performed by: ORTHOPAEDIC SURGERY

## 2021-08-04 PROCEDURE — 97161 PT EVAL LOW COMPLEX 20 MIN: CPT

## 2021-08-04 PROCEDURE — 25010000002 ENOXAPARIN PER 10 MG: Performed by: ORTHOPAEDIC SURGERY

## 2021-08-04 PROCEDURE — 99024 POSTOP FOLLOW-UP VISIT: CPT | Performed by: PHYSICIAN ASSISTANT

## 2021-08-04 PROCEDURE — 80048 BASIC METABOLIC PNL TOTAL CA: CPT | Performed by: ORTHOPAEDIC SURGERY

## 2021-08-04 PROCEDURE — 85025 COMPLETE CBC W/AUTO DIFF WBC: CPT | Performed by: ORTHOPAEDIC SURGERY

## 2021-08-04 PROCEDURE — 99232 SBSQ HOSP IP/OBS MODERATE 35: CPT | Performed by: FAMILY MEDICINE

## 2021-08-04 PROCEDURE — 97165 OT EVAL LOW COMPLEX 30 MIN: CPT

## 2021-08-04 RX ADMIN — DOCUSATE SODIUM 50MG AND SENNOSIDES 8.6MG 2 TABLET: 8.6; 5 TABLET, FILM COATED ORAL at 20:11

## 2021-08-04 RX ADMIN — DOCUSATE SODIUM 50MG AND SENNOSIDES 8.6MG 2 TABLET: 8.6; 5 TABLET, FILM COATED ORAL at 08:47

## 2021-08-04 RX ADMIN — SODIUM CHLORIDE, PRESERVATIVE FREE 3 ML: 5 INJECTION INTRAVENOUS at 08:47

## 2021-08-04 RX ADMIN — ENOXAPARIN SODIUM 40 MG: 40 INJECTION SUBCUTANEOUS at 08:46

## 2021-08-04 RX ADMIN — ATORVASTATIN CALCIUM 40 MG: 40 TABLET, FILM COATED ORAL at 08:47

## 2021-08-04 RX ADMIN — LISINOPRIL 40 MG: 20 TABLET ORAL at 08:47

## 2021-08-04 RX ADMIN — SODIUM CHLORIDE, PRESERVATIVE FREE 3 ML: 5 INJECTION INTRAVENOUS at 20:13

## 2021-08-04 RX ADMIN — AMLODIPINE BESYLATE 2.5 MG: 5 TABLET ORAL at 08:47

## 2021-08-04 RX ADMIN — CEFAZOLIN SODIUM 2 G: 2 SOLUTION INTRAVENOUS at 02:07

## 2021-08-04 RX ADMIN — PANTOPRAZOLE SODIUM 40 MG: 40 TABLET, DELAYED RELEASE ORAL at 06:27

## 2021-08-04 RX ADMIN — DOCUSATE SODIUM 100 MG: 100 CAPSULE, LIQUID FILLED ORAL at 08:47

## 2021-08-04 RX ADMIN — HYDROCODONE BITARTRATE AND ACETAMINOPHEN 1 TABLET: 7.5; 325 TABLET ORAL at 12:52

## 2021-08-04 RX ADMIN — HYDROCODONE BITARTRATE AND ACETAMINOPHEN 1 TABLET: 7.5; 325 TABLET ORAL at 20:11

## 2021-08-04 RX ADMIN — DOCUSATE SODIUM 100 MG: 100 CAPSULE, LIQUID FILLED ORAL at 20:11

## 2021-08-04 RX ADMIN — SODIUM CHLORIDE, PRESERVATIVE FREE 3 ML: 5 INJECTION INTRAVENOUS at 20:12

## 2021-08-04 NOTE — PROGRESS NOTES
"      [facility]  PROGRESS NOTE    Name:  Sal Siu   Age:  86 y.o.  Sex:  female  :  1935  MRN:  [id number]   Visit Number:  [id number]  Admission Date:  2021  Date Of Service:  21  Primary Care Physician:  David Barrios MD     LOS: 2 days :  Patient Care Team:  David Barrios MD as PCP - General (Internal Medicine)  Jeyson Land MD as Consulting Physician (Cardiology):    Chief Complaint:     POD # 1 Right hip surgery    Subjective / Interval History:   Patient is lying in bed without complaints.  She states \"I feel fine and haven't had to take any pain medication\"  Denies CP or SOA.      Review of Systems:     General ROS: No fever spike, no acute cognitive change.  Ophthalmic ROS: no acute visual disturbance.  ENT ROS: No acute sinus congestion.   Respiratory ROS: No new shortness of breath.   Cardiovascular ROS: No new chest pain.  Gastrointestinal ROS: No acute abdominal change. Non-distended.  Genito-Urinary ROS: No reported dysuria.  Musculoskeletal ROS: No deep calf pain. No acute focal motor deficit  Neurological ROS: No new numbness or tingling.  Dermatological ROS: No erythema.  No cyanosis.  No rash or pruritis.    Vital Signs:    Temp:  [97 °F (36.1 °C)-98.6 °F (37 °C)] 97.9 °F (36.6 °C)  Heart Rate:  [67-82] 82  Resp:  [12-18] 17  BP: (122-148)/(38-59) 147/48    Intake and output:    I/O last 3 completed shifts:  In: 3628 [P.O.:600; I.V.:3028]  Out: 1025 [Urine:1000; Blood:25]  I/O this shift:  In: 120 [P.O.:120]  Out: 250 [Urine:250]    Physical Examination:    General Appearance:    Alert, cooperative, and no acute distress.   Head:    Atraumatic and normocephalic, without obvious abnormality.   Eyes:    Extraocular movements are within normal limits.   ENT:   No acute change.   Neck:   Supple, trachea midline.   Lungs:     Normal respirations, unlabored.    Heart:    Regular rate.   Abdomen:     Soft, nondistended.   Extremities:   No new motor deficit, " no calf pain, Homans sign negative.   Skin:     No rash.  No cyanosis.  No erythema.  No active drainage.       Neurologic:   Sensation intact, pulses intact.     Laboratory results:    Lab Results (last 24 hours)     Procedure Component Value Units Date/Time    Basic Metabolic Panel [[id number]]  (Abnormal) Collected: 08/04/21 0612    Specimen: Blood Updated: 08/04/21 0654     Glucose 117 mg/dL      BUN 13 mg/dL      Creatinine 0.63 mg/dL      Sodium 138 mmol/L      Potassium 3.7 mmol/L      Chloride 105 mmol/L      CO2 22.2 mmol/L      Calcium 8.8 mg/dL      eGFR Non African Amer 90 mL/min/1.73      BUN/Creatinine Ratio 20.6     Anion Gap 10.8 mmol/L     Narrative:      GFR Normal >60  Chronic Kidney Disease <60  Kidney Failure <15      CBC & Differential [[id number]]  (Abnormal) Collected: 08/04/21 0612    Specimen: Blood Updated: 08/04/21 0647    Narrative:      The following orders were created for panel order CBC & Differential.  Procedure                               Abnormality         Status                     ---------                               -----------         ------                     CBC Auto Differential[[id number]]        Abnormal            Final result                 Please view results for these tests on the individual orders.    CBC Auto Differential [[id number]]  (Abnormal) Collected: 08/04/21 0612    Specimen: Blood Updated: 08/04/21 0647     WBC 12.89 10*3/mm3      RBC 3.56 10*6/mm3      Hemoglobin 11.1 g/dL      Hematocrit 33.1 %      MCV 93.0 fL      MCH 31.2 pg      MCHC 33.5 g/dL      RDW 12.2 %      RDW-SD 42.2 fl      MPV 12.7 fL      Platelets 150 10*3/mm3      Neutrophil % 80.8 %      Lymphocyte % 10.6 %      Monocyte % 7.8 %      Eosinophil % 0.2 %      Basophil % 0.2 %      Immature Grans % 0.4 %      Neutrophils, Absolute 10.43 10*3/mm3      Lymphocytes, Absolute 1.36 10*3/mm3      Monocytes, Absolute 1.00 10*3/mm3      Eosinophils, Absolute 0.02 10*3/mm3      Basophils,  Absolute 0.03 10*3/mm3      Immature Grans, Absolute 0.05 10*3/mm3      nRBC 0.0 /100 WBC           I have reviewed the patient's laboratory results.    Radiology results:    Imaging Results (Last 24 Hours)     ** No results found for the last 24 hours. **              Assessment/Plan      Benign essential HTN    Mixed hyperlipidemia    Status post fall    Fall    Traumatic closed nondisplaced fracture of neck of right femur (CMS/HCC)      S/P Right hip surgery:    Compression screws fixation of right hip femoral neck fracture.    Continue current management per the detailed orders and instructions, including skin, safety and fall precautions, respiratory therapy with incentive spirometer, advance diet as tolerated, bowel regimen, multi-modal analgesia, DVT prophylaxis, Hospitalist medical management, PT/OT following post-surgical rehab protocol, and Case Management for discharge and rehabilitation plans.  From the orthopedic standpoint patient is stable for discharge to rehab.   Toe touch to partial weight bearing with walker.   Patient will need DVT ppx Lovenox 40 mg sq once daily X 7 days  Please assist patient with a follow up visit in 2 weeks for staple removal.     Olivier Andrade PA-C  08/04/21  12:07 EDT

## 2021-08-04 NOTE — CASE MANAGEMENT/SOCIAL WORK
Continued Stay Note  ANNETTE De Leon     Patient Name: Sal Siu  MRN: [id number]  Today's Date: 8/4/2021    Admit Date: 8/2/2021    Discharge Plan     Row Name 08/04/21 1325       Plan    Plan  pt has been accpeted  to [facility] pending  PT/Ot evaul and review by [facility] Doctor to review updated pt of this     Rena Notified that PT/Ot notes were in the chart she will send to the docator for review   Pt has been accepted to [facility] 8/5   Nursing to call report to  To [facility] [phone] fax [phone]  Does not need a COVID test         Discharge Codes    No documentation.       Expected Discharge Date and Time     Expected Discharge Date Expected Discharge Time    Aug 5, 2021             Kae Tenorio RN

## 2021-08-04 NOTE — PLAN OF CARE
Goal Outcome Evaluation:  Plan of Care Reviewed With: (P) patient        Progress: (P) no change  Outcome Summary: (P) PT evaluation completed. Patient presents s/p R femur fx repair. Patient completed bed mobility with MIN assist x2. Patient completed sit>stand and 12 ft ambulation with verbal cuing for AD use and WB precautions and MIN assist x 2. Education was provided for TTWB/PWB status for LLE and HEP for QS/AP. Patient would benefit from skilled PT interventions during inpatient stay.

## 2021-08-04 NOTE — PLAN OF CARE
Goal Outcome Evaluation:  Plan of Care Reviewed With: patient, spouse        Progress: no change  Outcome Summary: OT eval completed. Patient presents deficits in strength, endurance, balance, mobility and ADL performance. Patient completed bed mobility, transfer and functional mobility around bed using RW with min A. Patient requires mod A for LB self care and toileting. Patient is expected to benefit from continued OT services prior to DC and wants to DC to short term rehab. Continue OT POC

## 2021-08-04 NOTE — PROGRESS NOTES
[facility]    Name:  Sal Siu   Age:  86 y.o.  Sex:  female  :  1935  MRN:  [id number]   Visit Number:  [id number]  Admission Date:  2021  Date Of Service:  21  Primary Care Physician:  David Barrios MD     LOS: 2 days :    Chief Complaint:      Follow-up hip pain    Subjective:    Patient seen at bedside today.  She is doing much better today.  Working with therapy well.  She states she is doing very well she thinks.  She wants to go to [facility] for rehab.  Case management working on this.  Eating and drinking well.    Hospital Course:    Patient is a pleasant 86-year-old female who had presented via EMS after sustaining a fall in her basement while carrying laundry basket on 2021.  Medical history includes hypertension, GERD, dyslipidemia.  She had evidence of a right femoral neck fracture with orthopedics consulted.  Patient underwent operative intervention with screw fixation on 8/3/2021 with Dr. Wooten.  Postoperative rehab sought    Review of Systems:     All systems were reviewed and negative except as mentioned in subjective, assessment and plan.    Vital Signs:    Temp:  [97 °F (36.1 °C)-98.2 °F (36.8 °C)] 97.9 °F (36.6 °C)  Heart Rate:  [67-82] 82  Resp:  [16-18] 17  BP: (122-148)/(48-59) 147/48    Intake and output:    I/O last 3 completed shifts:  In: 3628 [P.O.:600; I.V.:3028]  Out: 1025 [Urine:1000; Blood:25]  I/O this shift:  In: 120 [P.O.:120]  Out: 250 [Urine:250]    Physical Examination:    General Appearance:  Alert and cooperative.  No acute distress   Head:  Atraumatic and normocephalic.   Eyes: Conjunctivae and sclerae normal, no icterus. No pallor.   Throat: No oral lesions, no thrush, oral mucosa moist.   Neck: Supple, trachea midline, no thyromegaly.   Lungs:   Breath sounds heard bilaterally equally.  No crackles or wheezing. No Pleural rub or bronchial breathing.   Heart:  Normal S1 and S2, no murmur, no  gallop, no rub. No JVD.   Abdomen:   Normal bowel sounds, no masses, no organomegaly. Soft, nontender, nondistended, no rebound tenderness.   Extremities:  Right hip bandages intact and dry.  No significant edema or bruising.   Skin: No bleeding or rash.   Neurologic: Alert and oriented x 3. No facial asymmetry. Moves all four limbs. No tremors.      Laboratory results:    Results from last 7 days   Lab Units 08/04/21  0612 08/03/21  0631 08/02/21  2336   SODIUM mmol/L 138 138 139   POTASSIUM mmol/L 3.7 4.1 3.8   CHLORIDE mmol/L 105 107 105   CO2 mmol/L 22.2 23.5 22.1   BUN mg/dL 13 16 19   CREATININE mg/dL 0.63 0.62 0.60   CALCIUM mg/dL 8.8 9.1 9.2   BILIRUBIN mg/dL  --   --  0.5   ALK PHOS U/L  --   --  91   ALT (SGPT) U/L  --   --  17   AST (SGOT) U/L  --   --  15   GLUCOSE mg/dL 117* 138* 138*     Results from last 7 days   Lab Units 08/04/21  0612 08/03/21  0631 08/02/21  2220   WBC 10*3/mm3 12.89* 11.89* 8.57   HEMOGLOBIN g/dL 11.1* 11.8* 12.9   HEMATOCRIT % 33.1* 35.0 38.0   PLATELETS 10*3/mm3 150 168 183     Results from last 7 days   Lab Units 08/02/21  2220   INR  0.97     Results from last 7 days   Lab Units 08/03/21  0631 08/02/21  2336   TROPONIN T ng/mL <0.010 <0.010             I have reviewed the patient's laboratory results.    Radiology results:    XR Chest 1 View    Result Date: 8/3/2021  PROCEDURE: XR CHEST 1 VW    HISTORY: Fall, medical clearance  COMPARISON: None.  FINDINGS: The heart is normal in size. The mediastinum is unremarkable. The lungs are clear. There is no pneumothorax. There are no acute osseous abnormalities.      Impression: No acute cardiopulmonary process.      PROCEDURE: XR HIP W OR WO PELVIS 2-3 VIEW RIGHT-  HISTORY: Fall, medical clearance  COMPARISON: September 2017.  FINDINGS:  A 2 view exam demonstrates a right femoral neck fracture. The joint spaces are preserved.  No soft tissue abnormality is seen.  IMPRESSION: Right femoral neck fracture.     Images were reviewed,  interpreted, and dictated by Dr. Jonathan Connors M.D. Transcribed by Carla Beauchamp PA-C.  This report was finalized on 8/3/2021 12:42 PM by Jonathan Connors M.D..    Peripheral Block    Result Date: 8/3/2021  Rayray Lam CRNA     8/3/2021 11:50 AM Peripheral Block Patient reassessed immediately prior to procedure Reason for block: at surgeon's request and post-op pain management Performed by CRNA: Rayray Lam CRNA Preanesthetic Checklist Completed: patient identified, IV checked, site marked, risks and benefits discussed, surgical consent, monitors and equipment checked, pre-op evaluation and timeout performed Prep: Pt Position: supine Sterile barriers:cap, gloves and mask Prep: ChloraPrep Patient monitoring: EKG, continuous pulse oximetry and blood pressure monitoring Procedure Sedation:yes Guidance:ultrasound guided Images:still images obtained Laterality:right Block Type:fascia iliaca compartment Injection Technique:single-shot Needle Type:echogenic Needle Gauge:21 G Medications Used: ropivacaine (NAROPIN) injection 0.5 %, 30 mL Medications Preservative Free Saline:30ml Post Assessment Injection Assessment: negative aspiration for heme, no paresthesia on injection and incremental injection Patient Tolerance:comfortable throughout block Complications:no Additional Notes Procedure:          FASCIA ILIACA BLOCK                             Patient analgesia was achieved with General Anesthesia   Pt was placed in the supine position.   The insertion site was prepped in sterile fashion with Chloraprep.  A BBraun echogenic needle was advance In-plane under ultrasound guidance. The Anterior superior Iliac crest was initially visualized and the probe was directed slightly medially and slightly towards the umbilicus.  The course of the needle was tracked over the sartorius muscle through the fascia Iliacus and into the anterior portion of the Iliacus muscle.  Major vessels where  identified and avoided as were structures of the peritoneal cavity.  LA injection was made incrementally in 1-5 ml amounts and spread was visualized superiorly below the fascia iliacus.  Injection was completed with negative aspiration of blood and negative intravascular injection.  Injection pressures were normal or minimal resistance.     FL C Arm During Surgery    Result Date: 8/3/2021  This procedure was auto-finalized with no dictation required.    XR Hip With or Without Pelvis 2 - 3 View Right    Result Date: 8/3/2021  PROCEDURE: XR CHEST 1 VW    HISTORY: Fall, medical clearance  COMPARISON: None.  FINDINGS: The heart is normal in size. The mediastinum is unremarkable. The lungs are clear. There is no pneumothorax. There are no acute osseous abnormalities.      Impression: No acute cardiopulmonary process.      PROCEDURE: XR HIP W OR WO PELVIS 2-3 VIEW RIGHT-  HISTORY: Fall, medical clearance  COMPARISON: September 2017.  FINDINGS:  A 2 view exam demonstrates a right femoral neck fracture. The joint spaces are preserved.  No soft tissue abnormality is seen.  IMPRESSION: Right femoral neck fracture.     Images were reviewed, interpreted, and dictated by Dr. Jonathan Connors M.D. Transcribed by Carla Beauchamp PA-C.  This report was finalized on 8/3/2021 12:42 PM by Jonathan Connors M.D..    I have reviewed the patient's radiology reports.    Medication Review:     I have reviewed the patient's active and prn medications.     Problem List:      Benign essential HTN    Mixed hyperlipidemia    Status post fall    Fall    Traumatic closed nondisplaced fracture of neck of right femur (CMS/HCC)      Assessment:    1.  Status post fall at home.  2.  Closed right nondisplaced femoral neck fracture, POA.  3.  Essential hypertension.  4.  GERD.    Plan:    Patient rubén hemodynamically stable.  Labs are stable.  She is doing well with physical therapy.  No further nausea.  Minimal pain control.  Encouraged her to  continue ambulating, use of incentive spirometer.  Her and  are in agreement with [facility] for rehab.   consulted.  Can likely be discharged tomorrow if accepted.    DVT Prophylaxis: Lovenox  Code Status: Full  Diet: As tolerated  Discharge Plan: 2 days to rehab    Ale Leija DO  08/04/21  15:42 EDT    Dictated utilizing Dragon dictation.

## 2021-08-04 NOTE — PLAN OF CARE
Goal Outcome Evaluation:  Plan of Care Reviewed With: patient        Progress: improving  Outcome Summary: Rested well, VSS, pain controlled, requiring NC 2L to maintain sats >90%, Encouraged IS use

## 2021-08-04 NOTE — CONSULTS
Patient: Sal FRYE Siu  Procedure(s):  HIP CANNULATED SCREWS PLACEMENT, RIGHT  Anesthesia type: general    Patient location: [facility] Surgical Floor  Last vitals:   Vitals:    08/04/21 0735   BP: 148/50   Pulse: 78   Resp: 18   Temp: 97.8 °F (36.6 °C)   SpO2: 97%     Level of consciousness: awake, alert and oriented    Post-anesthesia pain: adequate analgesia  Airway patency: patent  Respiratory: unassisted  Cardiovascular: stable and blood pressure at baseline  Hydration: euvolemic    Anesthetic complications: no

## 2021-08-04 NOTE — THERAPY EVALUATION
Patient Name: Sal Siu  : 1935    MRN: [id number]                              Today's Date: 2021       Admit Date: 2021    Visit Dx:     ICD-10-CM ICD-9-CM   1. Closed nondisplaced intertrochanteric fracture of right femur, initial encounter (CMS/HCC)  S72.144A 820.21   2. Fall, initial encounter  W19.XXXA E888.9   3. Closed traumatic nondisplaced fracture of neck of right femur, initial encounter (CMS/[facility])  S72.001A 820.8   4. Status post fall  Z91.81 V15.88     Patient Active Problem List   Diagnosis   • Benign essential HTN   • Mixed hyperlipidemia   • Leg swelling   • Status post fall   • Fall   • Traumatic closed nondisplaced fracture of neck of right femur (CMS/[facility])     Past Medical History:   Diagnosis Date   • Arthritis    • Body piercing     EARS ONLY   • Cataract, right eye    • Chronic hypertension    • Constipation    • Dyslipidemia    • GERD (gastroesophageal reflux disease)    • Glaucoma    • Hx of migraines    • Hyperlipidemia    • Hypertensive cardiovascular disease    • Incontinence in female    • Intermittent palpitations    • Obstructive sleep apnea     DOES NOT USE BPAP/ CPAP   • PONV (postoperative nausea and vomiting)    • UTI (urinary tract infection)    • Wears glasses    • Wears partial dentures     LOWER      Past Surgical History:   Procedure Laterality Date   • APPENDECTOMY     • COLONOSCOPY     • DILATATION AND CURETTAGE     • ENDOSCOPY     • EYE CAPSULOTOMY WITH LASER Left 2018    Procedure: EYE CAPSULOTOMY WITH LASER LEFT;  Surgeon: Meseret Simmons MD;  Location: [facility];  Service: Ophthalmology   • EYE SURGERY Bilateral     CATARACTS REMOVED   • HIP CANNULATED SCREW PLACEMENT Right 8/3/2021    Procedure: HIP CANNULATED SCREWS PLACEMENT, RIGHT;  Surgeon: Pelon Wooten MD;  Location: [facility];  Service: Orthopedics;  Laterality: Right;   • HYSTERECTOMY      COMPLETE   • LAPAROSCOPIC CHOLECYSTECTOMY     • LIPOMA EXCISION       Lower back   • LUMBAR DISC SURGERY  1997   • OOPHORECTOMY       General Information     Row Name 08/04/21 1432          OT Time and Intention    Document Type  evaluation  -SD     Mode of Treatment  occupational therapy  -SD     Row Name 08/04/21 1432          General Information    Patient Profile Reviewed  yes  -SD     Prior Level of Function  independent:;all household mobility  -SD     Existing Precautions/Restrictions  fall;other (see comments) TTWB RLE  -SD     Barriers to Rehab  medically complex  -SD     Row Name 08/04/21 1432          Living Environment    Lives With  spouse  -SD     Row Name 08/04/21 1432          Home Main Entrance    Number of Stairs, Main Entrance  two  -SD     Row Name 08/04/21 1432          Stairs Within Home, Primary    Number of Stairs, Within Home, Primary  ten  -SD     Stair Railings, Within Home, Primary  railings on both sides of stairs  -SD     Stairs Comment, Within Home, Primary  to basement  -SD     Row Name 08/04/21 1432          Cognition    Orientation Status (Cognition)  oriented x 4  -SD     [facility] Name 08/04/21 1432          Safety Issues, Functional Mobility    Safety Issues Affecting Function (Mobility)  safety precautions follow-through/compliance;safety precaution awareness;awareness of need for assistance;insight into deficits/self-awareness;unable to maintain weight-bearing restrictions  -SD     Impairments Affecting Function (Mobility)  balance;endurance/activity tolerance;pain;strength  -SD       User Key  (r) = Recorded By, (t) = Taken By, (c) = Cosigned By    Initials Name Provider Type    Nano Donauhe OT Occupational Therapist          Mobility/ADL's     Row Name 08/04/21 1433          Bed Mobility    Bed Mobility  supine-sit  -SD     Supine-Sit [place] (Bed Mobility)  minimum assist (75% patient effort)  -SD     Bed Mobility, Safety Issues  decreased use of legs for bridging/pushing  -SD     Assistive Device (Bed Mobility)  bed rails;head of bed  elevated  -SD     Row Name 08/04/21 1433          Transfers    Transfers  sit-stand transfer  -SD     Sit-Stand [place] (Transfers)  minimum assist (75% patient effort)  -SD     Row Name 08/04/21 1433          Sit-Stand Transfer    Assistive Device (Sit-Stand Transfers)  walker, front-wheeled  -SD     Row Name 08/04/21 1433          Functional Mobility    Functional Mobility- Ind. Level  minimum assist (75% patient effort)  -SD     Functional Mobility- Device  rolling walker  -SD     Functional Mobility-Distance (Feet)  12  -SD     Functional Mobility-Maintain WBing  assist to maintain weight bearing status;cues to maintain weight bearing status  -SD     Functional Mobility- Safety Issues  balance decreased during turns;sequencing ability decreased;step length decreased;weight-shifting ability decreased  -SD     Row Name 08/04/21 1433          Activities of Daily Living    BADL Assessment/Intervention  bathing;upper body dressing;lower body dressing;grooming;feeding;toileting  -SD     Row Name 08/04/21 1433          Bathing Assessment/Intervention    [place] Level (Bathing)  lower body;moderate assist (50% patient effort)  -SD     Row Name 08/04/21 [facility]          Upper Body Dressing Assessment/Training    [place] Level (Upper Body Dressing)  set up  -SD     Row Name 08/04/21 [facility]          Lower Body Dressing Assessment/Training    [place] Level (Lower Body Dressing)  don;pants/bottoms;socks;moderate assist (50% patient effort)  -SD     Row Name 08/04/21 1433          Grooming Assessment/Training    [place] Level (Grooming)  set up  -SD     Row Name 08/04/21 1433          Self-Feeding Assessment/Training    [place] Level (Feeding)  set up  -SD     Row Name 08/04/21 1433          Toileting Assessment/Training    [place] Level (Toileting)  moderate assist (50% patient effort)  -SD     Assistive Devices (Toileting)  commode, bedside without drop arms  -SD       User Key  (r) = Recorded  By, (t) = Taken By, (c) = Cosigned By    Initials Name Provider Type    SD Nano Sousa OT Occupational Therapist        Obj/Interventions     Row Name 08/04/21 1434          Range of Motion Comprehensive    General Range of Motion  bilateral upper extremity ROM WFL  -SD     Row Name 08/04/21 1434          Strength Comprehensive (MMT)    General Manual Muscle Testing (MMT) Assessment  upper extremity strength deficits identified  -SD     Comment, General Manual Muscle Testing (MMT) Assessment  UB 4-/5  -SD       User Key  (r) = Recorded By, (t) = Taken By, (c) = Cosigned By    Initials Name Provider Type    SD Nnao Sousa OT Occupational Therapist        Goals/Plan     Row Name 08/04/21 1437          Transfer Goal 1 (OT)    Activity/Assistive Device (Transfer Goal 1, OT)  sit-to-stand/stand-to-sit;walker, rolling  -SD     [place] Level/Cues Needed (Transfer Goal 1, OT)  contact guard assist  -SD     Time Frame (Transfer Goal 1, OT)  long term goal (LTG);2 weeks  -SD     Progress/Outcome (Transfer Goal 1, OT)  goal ongoing  -SD     [facility] Name 08/04/21 1437          Dressing Goal 1 (OT)    Activity/Device (Dressing Goal 1, OT)  lower body dressing  -SD     [place]/Cues Needed (Dressing Goal 1, OT)  minimum assist (75% or more patient effort)  -SD     Time Frame (Dressing Goal 1, OT)  2 weeks  -SD     Progress/Outcome (Dressing Goal 1, OT)  goal ongoing  -SD     Row Name 08/04/21 1437          Toileting Goal 1 (OT)    Activity/Device (Toileting Goal 1, OT)  toileting skills, all;commode, bedside without drop arms;commode, 3-in-1  -SD     [place] Level/Cues Needed (Toileting Goal 1, OT)  minimum assist (75% or more patient effort)  -SD     Time Frame (Toileting Goal 1, OT)  2 weeks  -SD     Progress/Outcome (Toileting Goal 1, OT)  goal ongoing  -SD     Row Name 08/04/21 1437          Strength Goal 1 (OT)    Strength Goal 1 (OT)  Patient to perform UB ther ex as tolerated  -SD     Time Frame  (Strength Goal 1, OT)  long term goal (LTG)  -SD     Progress/Outcome (Strength Goal 1, OT)  goal ongoing  -SD     Row Name 08/04/21 1437          Therapy Assessment/Plan (OT)    Planned Therapy Interventions (OT)  activity tolerance training;adaptive equipment training;BADL retraining;patient/caregiver education/training;ROM/therapeutic exercise;strengthening exercise;transfer/mobility retraining  -SD       User Key  (r) = Recorded By, (t) = Taken By, (c) = Cosigned By    Initials Name Provider Type    Nano Donahue OT Occupational Therapist        Clinical Impression     Row Name 08/04/21 1435          Pain Assessment    Additional Documentation  Pain Scale: Numbers Pre/Post-Treatment (Group)  -SD     Row Name 08/04/21 1435          Pain Scale: Numbers Pre/Post-Treatment    Pretreatment Pain Rating  0/10 - no pain  -SD     Posttreatment Pain Rating  3/10  -SD     Pain Location - Side  Right  -SD     Pain Location - Orientation  lower  -SD     Pain Location  extremity  -SD     Pain Intervention(s)  Repositioned;Ambulation/increased activity  -SD     Row Name 08/04/21 1435          Plan of Care Review    Plan of Care Reviewed With  patient;spouse  -SD     Progress  no change  -SD     Outcome Summary  OT eval completed. Patient presents deficits in strength, endurance, balance, mobility and ADL performance. Patient completed bed mobility, transfer and functional mobility around bed using RW with min A. Patient requires mod A for LB self care and toileting. Patient is expected to benefit from continued OT services prior to DC and wants to DC to short term rehab. Continue OT POC  -SD     Row Name 08/04/21 1435          Therapy Assessment/Plan (OT)    Rehab Potential (OT)  good, to achieve stated therapy goals  -SD     Criteria for Skilled Therapeutic Interventions Met (OT)  skilled treatment is necessary  -SD     Therapy Frequency (OT)  daily Mon-Fri  -SD     Row Name 08/04/21 1435          Therapy Plan  Review/Discharge Plan (OT)    Anticipated Discharge Disposition (OT)  inpatient rehabilitation facility  -SD     Row Name 08/04/21 1435          Vital Signs    O2 Delivery Pre Treatment  supplemental O2  -SD     O2 Delivery Intra Treatment  room air  -SD     O2 Delivery Post Treatment  room air  -SD     Row Name 08/04/21 1435          Positioning and Restraints    Pre-Treatment Position  in bed  -SD     Post Treatment Position  chair  -SD     In Chair  reclined;call light within reach;encouraged to call for assist;exit alarm on;with family/caregiver;with PT  -SD       User Key  (r) = Recorded By, (t) = Taken By, (c) = Cosigned By    Initials Name Provider Type    Nano Donahue OT Occupational Therapist        Outcome Measures     Row Name 08/04/21 1438          How much help from another is currently needed...    Putting on and taking off regular lower body clothing?  2  -SD     Bathing (including washing, rinsing, and drying)  2  -SD     Toileting (which includes using toilet bed pan or urinal)  2  -SD     Putting on and taking off regular upper body clothing  4  -SD     Taking care of personal grooming (such as brushing teeth)  4  -SD     Eating meals  4  -SD     AM-PAC 6 Clicks Score (OT)  18  -SD     Row Name 08/04/21 1438          Functional Assessment    Outcome Measure Options  AM-PAC 6 Clicks Daily Activity (OT)  -SD       User Key  (r) = Recorded By, (t) = Taken By, (c) = Cosigned By    Initials Name Provider Type    Nano Donahue OT Occupational Therapist          Occupational Therapy Education                 Title: PT OT SLP Therapies (In Progress)     Topic: Occupational Therapy (In Progress)     Point: ADL training (Done)     Description:   Instruct learner(s) on proper safety adaptation and remediation techniques during self care or transfers.   Instruct in proper use of assistive devices.              Learning Progress Summary           Patient Acceptance, E,TB, VU by SD at 8/4/2021  9424    Comment: Benefit of OT; OT POC                   Point: Home exercise program (Not Started)     Description:   Instruct learner(s) on appropriate technique for monitoring, assisting and/or progressing therapeutic exercises/activities.              Learner Progress:  Not documented in this visit.          Point: Precautions (Not Started)     Description:   Instruct learner(s) on prescribed precautions during self-care and functional transfers.              Learner Progress:  Not documented in this visit.          Point: Body mechanics (Not Started)     Description:   Instruct learner(s) on proper positioning and spine alignment during self-care, functional mobility activities and/or exercises.              Learner Progress:  Not documented in this visit.                      User Key     Initials Effective Dates Name Provider Type Discipline    SD 06/16/21 -  Nano Sousa OT Occupational Therapist OT              OT Recommendation and Plan  Planned Therapy Interventions (OT): activity tolerance training, adaptive equipment training, BADL retraining, patient/caregiver education/training, ROM/therapeutic exercise, strengthening exercise, transfer/mobility retraining  Therapy Frequency (OT): daily (Mon-Fri)  Plan of Care Review  Plan of Care Reviewed With: patient, spouse  Progress: no change  Outcome Summary: OT eval completed. Patient presents deficits in strength, endurance, balance, mobility and ADL performance. Patient completed bed mobility, transfer and functional mobility around bed using RW with min A. Patient requires mod A for LB self care and toileting. Patient is expected to benefit from continued OT services prior to DC and wants to DC to short term rehab. Continue OT POC     Time Calculation:   Time Calculation- OT     Row Name 08/04/21 1440             Time Calculation- OT    OT Start Time  1340  -SD      OT Received On  08/04/21  -SD      OT Goal Re-Cert Due Date  08/16/21  -SD          Untimed Charges    OT Eval/Re-eval Minutes  30  -SD         Total Minutes    Untimed Charges Total Minutes  30  -SD       Total Minutes  30  -SD        User Key  (r) = Recorded By, (t) = Taken By, (c) = Cosigned By    Initials Name Provider Type    Nano Donahue OT Occupational Therapist        Therapy Charges for Today     Code Description Service Date Service Provider Modifiers Qty    [id number]  OT EVAL LOW COMPLEXITY 2 8/4/2021 Nano Sousa OT GO 1               Nano Sousa OT  8/4/2021

## 2021-08-04 NOTE — THERAPY EVALUATION
Patient Name: Sal Siu  : 1935    MRN: [id number]                              Today's Date: 2021       Admit Date: 2021    Visit Dx:     ICD-10-CM ICD-9-CM   1. Closed nondisplaced intertrochanteric fracture of right femur, initial encounter (CMS/HCC)  S72.144A 820.21   2. Fall, initial encounter  W19.XXXA E888.9   3. Closed traumatic nondisplaced fracture of neck of right femur, initial encounter (CMS/[facility])  S72.001A 820.8   4. Status post fall  Z91.81 V15.88     Patient Active Problem List   Diagnosis   • Benign essential HTN   • Mixed hyperlipidemia   • Leg swelling   • Status post fall   • Fall   • Traumatic closed nondisplaced fracture of neck of right femur (CMS/[facility])     Past Medical History:   Diagnosis Date   • Arthritis    • Body piercing     EARS ONLY   • Cataract, right eye    • Chronic hypertension    • Constipation    • Dyslipidemia    • GERD (gastroesophageal reflux disease)    • Glaucoma    • Hx of migraines    • Hyperlipidemia    • Hypertensive cardiovascular disease    • Impaired functional mobility, balance, gait, and endurance 2021   • Incontinence in female    • Intermittent palpitations    • Obstructive sleep apnea     DOES NOT USE BPAP/ CPAP   • PONV (postoperative nausea and vomiting)    • UTI (urinary tract infection)    • Wears glasses    • Wears partial dentures     LOWER      Past Surgical History:   Procedure Laterality Date   • APPENDECTOMY     • COLONOSCOPY     • DILATATION AND CURETTAGE     • ENDOSCOPY     • EYE CAPSULOTOMY WITH LASER Left 2018    Procedure: EYE CAPSULOTOMY WITH LASER LEFT;  Surgeon: Meseret Simmons MD;  Location: [facility];  Service: Ophthalmology   • EYE SURGERY Bilateral     CATARACTS REMOVED   • HIP CANNULATED SCREW PLACEMENT Right 8/3/2021    Procedure: HIP CANNULATED SCREWS PLACEMENT, RIGHT;  Surgeon: Pelon Wooten MD;  Location: [facility];  Service: Orthopedics;  Laterality: Right;   • HYSTERECTOMY       COMPLETE   • LAPAROSCOPIC CHOLECYSTECTOMY     • LIPOMA EXCISION      Lower back   • LUMBAR DISC SURGERY  1997   • OOPHORECTOMY       General Information     Row Name 08/04/21 1343          Physical Therapy Time and Intention    Document Type  evaluation  (Pended)   -CW     Mode of Treatment  physical therapy  (Pended)   -CW     Row Name 08/04/21 1343          General Information    Patient Profile Reviewed  yes  (Pended)   -CW     Prior Level of Function  independent:;all household mobility;community mobility  (Pended)   -CW     Existing Precautions/Restrictions  fall;weight bearing  (Pended)  RLE TTWB/ PWB  -CW     Row Name 08/04/21 1343          Living Environment    Lives With  spouse  (Pended)   -CW     Row Name 08/04/21 1343          Home Main Entrance    Number of Stairs, Main Entrance  two  (Pended)   -CW     Stair Railings, Main Entrance  none  (Pended)   -     Row Name 08/04/21 1343          Stairs Within Home, Primary    Stairs, Within Home, Primary  Stairs to basement with LENNY rails.  (Pended)   -CW     Stair Railings, Within Home, Primary  railings on both sides of stairs  (Pended)   -CW     Row Name 08/04/21 1343          Cognition    Orientation Status (Cognition)  oriented x 4  (Pended)   -     Row Name 08/04/21 1343          Safety Issues, Functional Mobility    Safety Issues Affecting Function (Mobility)  insight into deficits/self-awareness;ability to follow commands;safety precautions follow-through/compliance;unable to maintain weight-bearing restrictions  (Pended)   -CW     Impairments Affecting Function (Mobility)  balance;endurance/activity tolerance;strength;pain  (Pended)   -CW     Comment, Safety Issues/Impairments (Mobility)  verbal cues to mainiting WB status  (Pended)   -CW       User Key  (r) = Recorded By, (t) = Taken By, (c) = Cosigned By    Initials Name Provider Type    CW Blanche Martines, PT Student PT Student        Mobility     Row Name 08/04/21 3093          Bed Mobility     Bed Mobility  supine-sit  (Pended)   -CW     Supine-Sit [place] (Bed Mobility)  minimum assist (75% patient effort);2 person assist  (Pended)   -CW     Assistive Device (Bed Mobility)  bed rails;head of bed elevated  (Pended)   -[facility] Name 08/04/21 1449          Sit-Stand Transfer    Sit-Stand [place] (Transfers)  minimum assist (75% patient effort);2 person assist;verbal cues  (Pended)   -     Assistive Device (Sit-Stand Transfers)  walker, front-wheeled  (Pended)   -[facility] Name 08/04/21 1449          Gait/Stairs (Locomotion)    [place] Level (Gait)  verbal cues;nonverbal cues (demo/gesture);minimum assist (75% patient effort);2 person assist  (Pended)   -     Assistive Device (Gait)  walker, front-wheeled  (Pended)   -     Distance in Feet (Gait)  12ft  (Pended)   -     Deviations/Abnormal Patterns (Gait)  antalgic  (Pended)   -CW     Row Name 08/04/21 1449          Mobility    Extremity Weight-bearing Status  right lower extremity  (Pended)   -     Right Lower Extremity (Weight-bearing Status)  partial weight-bearing (PWB);toe touch weight-bearing (TTWB)  (Pended)   -CW       User Key  (r) = Recorded By, (t) = Taken By, (c) = Cosigned By    Initials Name Provider Type    CW Blanche Martines, PT Student PT Student        Obj/Interventions     [facility] Name 08/04/21 1343          Range of Motion Comprehensive    General Range of Motion  bilateral upper extremity ROM WFL  (Pended)   -CW     Row Name 08/04/21 1343          Strength Comprehensive (MMT)    General Manual Muscle Testing (MMT) Assessment  no strength deficits identified  (Pended)   -CW     Row Name 08/04/21 1343          Balance    Balance Assessment  sitting static balance;sitting dynamic balance;standing static balance;standing dynamic balance  (Pended)   -CW     Static Sitting Balance  WFL  (Pended)   -CW     Dynamic Sitting Balance  WFL  (Pended)   -     Static Standing Balance  WFL  (Pended)   -     Dynamic Standing  Balance  WFL  (Pended)   -CW       User Key  (r) = Recorded By, (t) = Taken By, (c) = Cosigned By    Initials Name Provider Type    CW Blanche Martines, PT Student PT Student        Goals/Plan     Row Name 08/04/21 1343          Bed Mobility Goal 1 (PT)    Activity/Assistive Device (Bed Mobility Goal 1, PT)  bed mobility activities, all  (Pended)   -CW     [place] Level/Cues Needed (Bed Mobility Goal 1, PT)  supervision required  (Pended)   -CW     Time Frame (Bed Mobility Goal 1, PT)  long term goal (LTG);10 days  (Pended)   -CW     Progress/Outcomes (Bed Mobility Goal 1, PT)  goal ongoing  (Pended)   -CW     Row Name 08/04/21 1343          Transfer Goal 1 (PT)    Activity/Assistive Device (Transfer Goal 1, PT)  transfers, all  (Pended)   -CW     [place] Level/Cues Needed (Transfer Goal 1, PT)  supervision required  (Pended)   -CW     Time Frame (Transfer Goal 1, PT)  long term goal (LTG);10 days  (Pended)   -CW     Progress/Outcome (Transfer Goal 1, PT)  goal ongoing  (Pended)   -CW     Row Name 08/04/21 1343          Gait Training Goal 1 (PT)    Activity/Assistive Device (Gait Training Goal 1, PT)  decrease fall risk;gait (walking locomotion);maintain weight-bearing status  (Pended)   -CW     [place] Level (Gait Training Goal 1, PT)  contact guard assist  (Pended)   -CW     Distance (Gait Training Goal 1, PT)  50ft  (Pended)   -CW     Time Frame (Gait Training Goal 1, PT)  long term goal (LTG);10 days  (Pended)   -CW     Progress/Outcome (Gait Training Goal 1, PT)  goal ongoing  (Pended)   -CW       User Key  (r) = Recorded By, (t) = Taken By, (c) = Cosigned By    Initials Name Provider Type    CW Blanche Martines, PT Student PT Student        Clinical Impression     Row Name 08/04/21 1343          Pain    Additional Documentation  Pain Scale: Numbers Pre/Post-Treatment (Group)  (Pended)   -CW     Row Name 08/04/21 1343          Pain Scale: Numbers Pre/Post-Treatment    Pretreatment Pain Rating  0/10 -  no pain  (Pended)   -CW     Posttreatment Pain Rating  0/10 - no pain  (Pended)   -CW     Row Name 08/04/21 1343          Plan of Care Review    Plan of Care Reviewed With  patient  (Pended)   -CW     Progress  no change  (Pended)   -CW     Outcome Summary  PT evaluation completed. Patient presents s/p R femur fx repair. Patient completed bed mobility with MIN assist x2. Patient completed sit>stand and 12 ft ambulation with verbal cuing for AD use and WB precautions and MIN assist x 2. Education was provided for TTWB/PWB status for LLE and HEP for QS/AP. Patient would benefit from skilled PT interventions during inpatient stay.  (Pended)   -[facility] Name 08/04/21 1343          Therapy Assessment/Plan (PT)    Patient/Family Therapy Goals Statement (PT)  To go to rehab  (Pended)   -CW     Rehab Potential (PT)  good, to achieve stated therapy goals  (Pended)   -CW     Criteria for Skilled Interventions Met (PT)  yes;meets criteria;skilled treatment is necessary  (Pended)   -CW     Predicted Duration of Therapy Intervention (PT)  Until discharge  (Pended)   -CW     [facility] Name 08/04/21 1343          Vital Signs    O2 Delivery Pre Treatment  supplemental O2  (Pended)   -CW     O2 Delivery Intra Treatment  room air  (Pended)   -CW     O2 Delivery Post Treatment  room air  (Pended)   -CW     Pre Patient Position  Supine  (Pended)   -CW     Intra Patient Position  Standing  (Pended)   -CW     Post Patient Position  Sitting  (Pended)   -CW     Row Name 08/04/21 1343          Positioning and Restraints    Pre-Treatment Position  in bed  (Pended)   -CW     Post Treatment Position  chair  (Pended)   -CW     In Chair  reclined;call light within reach;encouraged to call for assist;exit alarm on;with family/caregiver  (Pended)   -CW       User Key  (r) = Recorded By, (t) = Taken By, (c) = Cosigned By    Initials Name Provider Type    Blanche Jiang, PT Student PT Student        Outcome Measures     Row Name 08/04/21 0272           How much help from another person do you currently need...    Turning from your back to your side while in flat bed without using bedrails?  3  (Pended)   -CW     Moving from lying on back to sitting on the side of a flat bed without bedrails?  3  (Pended)   -CW     Moving to and from a bed to a chair (including a wheelchair)?  3  (Pended)   -CW     Standing up from a chair using your arms (e.g., wheelchair, bedside chair)?  3  (Pended)   -CW     Climbing 3-5 steps with a railing?  2  (Pended)   -CW     To walk in hospital room?  3  (Pended)   -CW     AM-PAC 6 Clicks Score (PT)  17  (Pended)   -CW     Row Name 08/04/21 1438 08/04/21 1343       Functional Assessment    Outcome Measure Options  AM-PAC 6 Clicks Daily Activity (OT)  -SD  AM-PAC 6 Clicks Basic Mobility (PT)  (Pended)   -CW      User Key  (r) = Recorded By, (t) = Taken By, (c) = Cosigned By    Initials Name Provider Type    Nano Donahue, OT Occupational Therapist    CW Blanche Martines, PT Student PT Student                       Physical Therapy Education                 Title: PT OT SLP Therapies (In Progress)     Topic: Physical Therapy (Done)     Point: Mobility training (Done)     Learning Progress Summary           Patient Acceptance, E, VU by CW at 8/4/2021 1502    Comment: Education wa provided on the importance of PT for mobiloty, on TTWB/PWB status, and on HEP x20 when resting.                   Point: Home exercise program (Done)     Learning Progress Summary           Patient Acceptance, E, VU by CW at 8/4/2021 1502    Comment: Education wa provided on the importance of PT for mobiloty, on TTWB/PWB status, and on HEP x20 when resting.                   Point: Precautions (Done)     Learning Progress Summary           Patient Acceptance, E, VU by CW at 8/4/2021 1502    Comment: Education wa provided on the importance of PT for mobiloty, on TTWB/PWB status, and on HEP x20 when resting.                               User Key     Initials  Effective Dates Name Provider Type Discipline    CW 07/26/21 -  Blanche Martines, PT Student PT Student PT              PT Recommendation and Plan  Planned Therapy Interventions (PT): (P) gait training, patient/family education, strengthening, transfer training  Plan of Care Reviewed With: (P) patient  Progress: (P) no change  Outcome Summary: (P) PT evaluation completed. Patient presents s/p R femur fx repair. Patient completed bed mobility with MIN assist x2. Patient completed sit>stand and 12 ft ambulation with verbal cuing for AD use and WB precautions and MIN assist x 2. Education was provided for TTWB/PWB status for LLE and HEP for QS/AP. Patient would benefit from skilled PT interventions during inpatient stay.     Time Calculation:   PT Charges     Row Name 08/04/21 1504             Time Calculation    Start Time  1343  (Pended)   -CW      PT Received On  08/04/21  (Pended)   -CW      PT Goal Re-Cert Due Date  08/14/21  (Pended)   -CW         Untimed Charges    PT Eval/Re-eval Minutes  38  (Pended)   -CW         Total Minutes    Untimed Charges Total Minutes  38  (Pended)   -CW       Total Minutes  38  (Pended)   -CW        User Key  (r) = Recorded By, (t) = Taken By, (c) = Cosigned By    Initials Name Provider Type    CW Blanche Martines, PT Student PT Student        Therapy Charges for Today     Code Description Service Date Service Provider Modifiers Qty    [id number] HC PT EVAL LOW COMPLEXITY 3 8/4/2021 Blanche Martines PT Student GP 1          PT G-Codes  Outcome Measure Options: AM-PAC 6 Clicks Daily Activity (OT)  AM-PAC 6 Clicks Score (PT): (P) 17  AM-PAC 6 Clicks Score (OT): 18    DELLA MORALEZ  8/4/2021

## 2021-08-05 VITALS
RESPIRATION RATE: 18 BRPM | HEART RATE: 86 BPM | OXYGEN SATURATION: 94 % | DIASTOLIC BLOOD PRESSURE: 70 MMHG | SYSTOLIC BLOOD PRESSURE: 160 MMHG | HEIGHT: 62 IN | BODY MASS INDEX: 29.13 KG/M2 | WEIGHT: 158.29 LBS | TEMPERATURE: 99.2 F

## 2021-08-05 LAB
DEPRECATED RDW RBC AUTO: 44.3 FL (ref 37–54)
ERYTHROCYTE [DISTWIDTH] IN BLOOD BY AUTOMATED COUNT: 12.7 % (ref 12.3–15.4)
HCT VFR BLD AUTO: 31.8 % (ref 34–46.6)
HGB BLD-MCNC: 10.1 G/DL (ref 12–15.9)
MCH RBC QN AUTO: 30.1 PG (ref 26.6–33)
MCHC RBC AUTO-ENTMCNC: 31.8 G/DL (ref 31.5–35.7)
MCV RBC AUTO: 94.9 FL (ref 79–97)
PLATELET # BLD AUTO: 140 10*3/MM3 (ref 140–450)
PMV BLD AUTO: 12.9 FL (ref 6–12)
RBC # BLD AUTO: 3.35 10*6/MM3 (ref 3.77–5.28)
WBC # BLD AUTO: 10.09 10*3/MM3 (ref 3.4–10.8)

## 2021-08-05 PROCEDURE — 25010000002 ONDANSETRON PER 1 MG: Performed by: ORTHOPAEDIC SURGERY

## 2021-08-05 PROCEDURE — 25010000002 ENOXAPARIN PER 10 MG: Performed by: ORTHOPAEDIC SURGERY

## 2021-08-05 PROCEDURE — 63710000001 ONDANSETRON PER 8 MG: Performed by: ORTHOPAEDIC SURGERY

## 2021-08-05 PROCEDURE — 97150 GROUP THERAPEUTIC PROCEDURES: CPT

## 2021-08-05 PROCEDURE — 99239 HOSP IP/OBS DSCHRG MGMT >30: CPT | Performed by: FAMILY MEDICINE

## 2021-08-05 PROCEDURE — 85027 COMPLETE CBC AUTOMATED: CPT | Performed by: FAMILY MEDICINE

## 2021-08-05 RX ADMIN — HYDROCODONE BITARTRATE AND ACETAMINOPHEN 1 TABLET: 7.5; 325 TABLET ORAL at 12:23

## 2021-08-05 RX ADMIN — SODIUM CHLORIDE, PRESERVATIVE FREE 3 ML: 5 INJECTION INTRAVENOUS at 09:04

## 2021-08-05 RX ADMIN — DOCUSATE SODIUM 50MG AND SENNOSIDES 8.6MG 2 TABLET: 8.6; 5 TABLET, FILM COATED ORAL at 09:05

## 2021-08-05 RX ADMIN — LISINOPRIL 40 MG: 20 TABLET ORAL at 09:05

## 2021-08-05 RX ADMIN — DOCUSATE SODIUM 100 MG: 100 CAPSULE, LIQUID FILLED ORAL at 09:05

## 2021-08-05 RX ADMIN — ONDANSETRON 4 MG: 2 INJECTION INTRAMUSCULAR; INTRAVENOUS at 01:53

## 2021-08-05 RX ADMIN — ONDANSETRON HYDROCHLORIDE 4 MG: 4 TABLET, FILM COATED ORAL at 12:26

## 2021-08-05 RX ADMIN — ENOXAPARIN SODIUM 40 MG: 40 INJECTION SUBCUTANEOUS at 09:05

## 2021-08-05 RX ADMIN — ATORVASTATIN CALCIUM 40 MG: 40 TABLET, FILM COATED ORAL at 09:05

## 2021-08-05 RX ADMIN — PANTOPRAZOLE SODIUM 40 MG: 40 TABLET, DELAYED RELEASE ORAL at 06:08

## 2021-08-05 RX ADMIN — AMLODIPINE BESYLATE 2.5 MG: 5 TABLET ORAL at 09:05

## 2021-08-05 RX ADMIN — HYDROCODONE BITARTRATE AND ACETAMINOPHEN 1 TABLET: 7.5; 325 TABLET ORAL at 01:58

## 2021-08-05 NOTE — CASE MANAGEMENT/SOCIAL WORK
Continued Stay Note  [facility]     Patient Name: Sal Siu  MRN: [id number]  Today's Date: 8/5/2021    Admit Date: 8/2/2021    Discharge Plan     Row Name 08/05/21 1056       Plan    Plan  spoke wit Rena[facility] stated pt would need to be at there facility by 4pm if transferring today and if not let her know; lm for md and rn  12:44 EDT   Spoke with pt, stated spouse is aware that she is being transferred today to [facility]; informed Rena [facility]mon pt is coming today        Discharge Codes    No documentation.       Expected Discharge Date and Time     Expected Discharge Date Expected Discharge Time    Aug 5, 2021             Ashlee Dial RN

## 2021-08-05 NOTE — THERAPY DISCHARGE NOTE
Patient Name: Sal Siu  : 1935    MRN: [id number]                              Today's Date: 2021       Admit Date: 2021    Visit Dx:     ICD-10-CM ICD-9-CM   1. Closed nondisplaced intertrochanteric fracture of right femur, initial encounter (CMS/HCC)  S72.144A 820.21   2. Fall, initial encounter  W19.XXXA E888.9   3. Closed traumatic nondisplaced fracture of neck of right femur, initial encounter (CMS/[facility])  S72.001A 820.8   4. Status post fall  Z91.81 V15.88     Patient Active Problem List   Diagnosis   • Benign essential HTN   • Mixed hyperlipidemia   • Leg swelling   • Status post fall   • Fall   • Traumatic closed nondisplaced fracture of neck of right femur (CMS/[facility])     Past Medical History:   Diagnosis Date   • Arthritis    • Body piercing     EARS ONLY   • Cataract, right eye    • Chronic hypertension    • Constipation    • Dyslipidemia    • GERD (gastroesophageal reflux disease)    • Glaucoma    • Hx of migraines    • Hyperlipidemia    • Hypertensive cardiovascular disease    • Impaired functional mobility, balance, gait, and endurance 2021   • Incontinence in female    • Intermittent palpitations    • Obstructive sleep apnea     DOES NOT USE BPAP/ CPAP   • PONV (postoperative nausea and vomiting)    • UTI (urinary tract infection)    • Wears glasses    • Wears partial dentures     LOWER      Past Surgical History:   Procedure Laterality Date   • APPENDECTOMY     • COLONOSCOPY     • DILATATION AND CURETTAGE     • ENDOSCOPY     • EYE CAPSULOTOMY WITH LASER Left 2018    Procedure: EYE CAPSULOTOMY WITH LASER LEFT;  Surgeon: Meseret Simmons MD;  Location: [facility];  Service: Ophthalmology   • EYE SURGERY Bilateral     CATARACTS REMOVED   • HIP CANNULATED SCREW PLACEMENT Right 8/3/2021    Procedure: HIP CANNULATED SCREWS PLACEMENT, RIGHT;  Surgeon: Pelon Wooten MD;  Location: [facility] OR;  Service: Orthopedics;  Laterality: Right;   • HYSTERECTOMY       COMPLETE   • LAPAROSCOPIC CHOLECYSTECTOMY     • LIPOMA EXCISION      Lower back   • LUMBAR DISC SURGERY  1997   • OOPHORECTOMY       General Information     Row Name 08/05/21 1207 08/05/21 1150       Physical Therapy Time and Intention    Document Type  discharge evaluation/summary  -TW  therapy note (daily note)  -    Mode of Treatment  --  physical therapy  -    Row Name 08/05/21 1150          General Information    Patient Profile Reviewed  yes  -TW     Existing Precautions/Restrictions  fall;weight bearing  -     Row Name 08/05/21 1150          Cognition    Orientation Status (Cognition)  oriented x 4  -     Row Name 08/05/21 1150          Safety Issues, Functional Mobility    Safety Issues Affecting Function (Mobility)  insight into deficits/self-awareness;safety precaution awareness;safety precautions follow-through/compliance  -TW     Impairments Affecting Function (Mobility)  balance;endurance/activity tolerance;pain;strength  -TW       User Key  (r) = Recorded By, (t) = Taken By, (c) = Cosigned By    Initials Name Provider Type     Elvira Ramon, DELLA Physical Therapist        Mobility     Row Name 08/05/21 1150          Bed Mobility    Bed Mobility  scooting/bridging;rolling right;rolling left  -TW     Rolling Left [place] (Bed Mobility)  contact guard;verbal cues  -TW     Rolling Right [place] (Bed Mobility)  contact guard;verbal cues  -TW     Scooting/Bridging [place] (Bed Mobility)  contact guard;verbal cues  -TW     Supine-Sit [place] (Bed Mobility)  not tested  -     Row Name 08/05/21 1150          Sit-Stand Transfer    Sit-Stand [place] (Transfers)  not tested  -     Row Name 08/05/21 1150          Gait/Stairs (Locomotion)    [place] Level (Gait)  not tested  -       User Key  (r) = Recorded By, (t) = Taken By, (c) = Cosigned By    Initials Name Provider Type     Elvira Ramon PT Physical Therapist        Obj/Interventions     Row Name 08/05/21 1150           Motor Skills    Therapeutic Exercise  hip;knee BLE ther ex in supine 1 x 10: glut sets, quad sets, hip add sets, SAQ, R AAROM for hip ab/add and heelslides while able to do Independently on L  -TW       User Key  (r) = Recorded By, (t) = Taken By, (c) = Cosigned By    Initials Name Provider Type    TW Elvira Ramon, PT Physical Therapist        Goals/Plan     Row Name 08/05/21 1150          Bed Mobility Goal 1 (PT)    Progress/Outcomes (Bed Mobility Goal 1, PT)  goal partially met  -TW     Row Name 08/05/21 1150          Transfer Goal 1 (PT)    Progress/Outcome (Transfer Goal 1, PT)  goal partially met  -TW     Row Name 08/05/21 1150          Gait Training Goal 1 (PT)    Progress/Outcome (Gait Training Goal 1, PT)  goal partially met  -TW       User Key  (r) = Recorded By, (t) = Taken By, (c) = Cosigned By    Initials Name Provider Type     Elvira Ramon, PT Physical Therapist        Clinical Impression     Row Name 08/05/21 1150          Pain    Additional Documentation  Pain Scale: Numbers Pre/Post-Treatment (Group)  -TW     Row Name 08/05/21 1150          Pain Scale: Numbers Pre/Post-Treatment    Pretreatment Pain Rating  5/10  -TW     Posttreatment Pain Rating  5/10  -TW     Pain Location - Side  Right  -TW     Pain Location  hip  -TW     Pre/Posttreatment Pain Comment  Pt also had c/o being slightly nauseated with any movement.  -TW     Pain Intervention(s)  Repositioned;Ambulation/increased activity  -TW     Row Name 08/05/21 1150          Plan of Care Review    Plan of Care Reviewed With  patient  -TW     Outcome Summary  PT treatment completed this am with pt eager to perform LE ther ex, performing x 10 per flowsheet. Pt is to be d/c'd later this afternoon to [facility] and declined to get up at this time due to pending d/c.  -TW     Row Name 08/05/21 1150          Vital Signs    O2 Delivery Pre Treatment  room air  -TW     Pre Patient Position  Supine  -TW     Intra Patient Position  Side  Lying  -TW     Post Patient Position  Supine  -TW     Row Name 08/05/21 1150          Positioning and Restraints    Pre-Treatment Position  in bed  -TW     Post Treatment Position  bed  -TW     In Bed  supine;with family/caregiver;call light within reach;encouraged to call for assist;exit alarm on  -TW       User Key  (r) = Recorded By, (t) = Taken By, (c) = Cosigned By    Initials Name Provider Type    Elvira Maguire PT Physical Therapist        Outcome Measures     Row Name 08/05/21 1150          How much help from another person do you currently need...    Turning from your back to your side while in flat bed without using bedrails?  3  -TW     Moving from lying on back to sitting on the side of a flat bed without bedrails?  3  -TW     Moving to and from a bed to a chair (including a wheelchair)?  3  -TW     Standing up from a chair using your arms (e.g., wheelchair, bedside chair)?  3  -TW     Climbing 3-5 steps with a railing?  2  -TW     To walk in hospital room?  3  -TW     AM-PAC 6 Clicks Score (PT)  17  -TW     Row Name 08/05/21 1150          Functional Assessment    Outcome Measure Options  AM-PAC 6 Clicks Basic Mobility (PT)  -TW       User Key  (r) = Recorded By, (t) = Taken By, (c) = Cosigned By    Initials Name Provider Type    Elvira Maguire PT Physical Therapist        Physical Therapy Education                 Title: PT OT SLP Therapies (Done)     Topic: Physical Therapy (Done)     Point: Mobility training (Done)     Learning Progress Summary           Patient Acceptance, E,TB, VU by CC at 8/5/2021 1203    Acceptance, E, VU by  at 8/4/2021 1502    Comment: Education wa provided on the importance of PT for mobiloty, on TTWB/PWB status, and on HEP x20 when resting.                   Point: Home exercise program (Done)     Learning Progress Summary           Patient Acceptance, E,TB, VU by CC at 8/5/2021 1203    Acceptance, E,D, DU by TW at 8/5/2021 1150    Comment: Pt education on LE ther ex  technique.    Acceptance, E, VU by CW at 8/4/2021 1502    Comment: Education wa provided on the importance of PT for mobiloty, on TTWB/PWB status, and on HEP x20 when resting.                   Point: Precautions (Done)     Learning Progress Summary           Patient Acceptance, E,TB, VU by  at 8/5/2021 1203    Acceptance, E, VU by CW at 8/4/2021 1502    Comment: Education wa provided on the importance of PT for mobiloty, on TTWB/PWB status, and on HEP x20 when resting.                               User Key     Initials Effective Dates Name Provider Type Discipline     06/16/21 -  Chantell Beatty RN Registered Nurse Nurse    TW 06/16/21 -  Elvira Ramon, DELLA Physical Therapist PT     07/26/21 -  Blanche Martines PT Student PT Student PT              PT Recommendation and Plan     Plan of Care Reviewed With: patient  Outcome Summary: PT treatment completed this am with pt eager to perform LE ther ex, performing x 10 per flowsheet. Pt is to be d/c'd later this afternoon to [facility] and declined to get up at this time due to pending d/c.     Time Calculation:   PT Charges     Row Name 08/05/21 1150             Time Calculation    Start Time  1133  -TW      Stop Time  1150  -TW      Time Calculation (min)  17 min  -TW         Timed Charges    59157 - PT Therapeutic Exercise Minutes  17  -TW         Total Minutes    Timed Charges Total Minutes  17  -TW       Total Minutes  17  -TW        User Key  (r) = Recorded By, (t) = Taken By, (c) = Cosigned By    Initials Name Provider Type    TW Elvira Ramon, PT Physical Therapist        Therapy Charges for Today     Code Description Service Date Service Provider Modifiers Qty    39031009670 HC PT THER PROC GROUP 8/5/2021 Elvira Ramon, PT GP 1          PT G-Codes  Outcome Measure Options: AM-PAC 6 Clicks Basic Mobility (PT)  AM-PAC 6 Clicks Score (PT): 17  AM-PAC 6 Clicks Score (OT): 18    PT Discharge Summary  Anticipated Discharge Disposition (PT): inpatient  rehabilitation facility  Reason for Discharge: Discharge from facility  Outcomes Achieved: Patient able to partially acheive established goals  Discharge Destination: Inpatient rehabilitation facility    Elvira Ramon, DELLA  8/5/2021

## 2021-08-05 NOTE — PLAN OF CARE
Goal Outcome Evaluation:  Plan of Care Reviewed With: patient        Progress: improving  Outcome Summary: Patient pain well controlled with po pain meds. VSS. No acute events noted overnight.

## 2021-08-05 NOTE — PLAN OF CARE
Goal Outcome Evaluation:  Plan of Care Reviewed With: patient           Outcome Summary: PT treatment completed this am with pt eager to perform LE ther ex, performing x 10 per flowsheet. Pt is to be d/c'd later this afternoon to [facility] and declined to get up at this time due to pending d/c.

## 2021-08-05 NOTE — CASE MANAGEMENT/SOCIAL WORK
Case Management Discharge Note           Provided Post Acute Provider List?: Refused  Provided Post Acute Provider Quality & Resource List?: Refused    Selected Continued Care - Discharged on 8/5/2021 Admission date: 8/2/2021 - Discharge disposition: Rehab Facility or Unit (DC - External)    Destination Coordination complete.    Service Provider Selected Services Address Phone Fax Patient Preferred    [facility]  Skilled Nursing [address] [id number] [phone] --                  Transportation Services  Ambulance: [facility]    Final Discharge Disposition Code: 62 - inpatient rehab facility

## 2021-08-05 NOTE — DISCHARGE SUMMARY
[facility]      Name:  Sal Siu   Age:  86 y.o.  Sex:  female  :  1935  MRN:  [id number]   Visit Number:  [id number]    Admission Date:  2021  Date of Discharge:  2021  Primary Care Physician:  David Barrios MD    Important issues to note:    Patient to be transferred to [facility] for rehab.  Follow-up with PCP and orthopedics.    Discharge Diagnoses:     1.  Status post fall at home.  2.  Closed right nondisplaced femoral neck fracture, POA.  3.  Essential hypertension.  4.  GERD.    Problem List:     Active Hospital Problems    Diagnosis  POA   • Status post fall [Z91.81]  Not Applicable   • Fall [W19.XXXA]  Unknown   • Traumatic closed nondisplaced fracture of neck of right femur (CMS/[facility]) [S72.001A]  Unknown   • Mixed hyperlipidemia [E78.2]  Yes   • Benign essential HTN [I10]  Yes      Resolved Hospital Problems    Diagnosis Date Resolved POA   • **Closed nondisplaced intertrochanteric fracture of right femur (CMS/[facility]) [S72.144A] 2021 Yes     Presenting Problem:    Chief Complaint   Patient presents with   • Fall      Consults:     Consulting Physician(s)  Chat With All Active Members    Provider Relationship Specialty    Pelon Wooten MD  Consulting Physician Orthopedic Surgery    Ale Leija DO Consulting Physician Hospitalist        Procedures Performed:    Procedure(s):  HIP CANNULATED SCREWS PLACEMENT, RIGHT    History of presenting illness/Hospital Course:    Patient is an 86-year-old female who had presented via EMS after sustaining a fall in her basement while carrying laundry on 2021.  Medical history of hypertension, dyslipidemia, GERD.  She unfortunately had evidence of a right femoral neck fracture upon presentation.  She was given as needed pain control.  Orthopedics was consulted.  She underwent operative intervention with screw fixation on 8/3/2021 with Dr. Wooten.  Patient has had a normal  largely uncomplicated postoperative course.  Her pain is been well controlled and she is working with physical therapy.  Her vital signs have been stable.  Her labs have been stable.  She is eating and drinking well.  Having bowel movements.  Voiding well.  After discussing with patient and , they were requesting short-term rehab, [facility].  She was accepted.  Will be transferred today to their facility for further therapy.    Follow-up with PCP as directed.  Follow-up with orthopedics as directed.  Lovenox for VT prophylaxis for another 7 to 14 days.    Vital Signs:    Temp:  [97.6 °F (36.4 °C)-99.2 °F (37.3 °C)] 99.2 °F (37.3 °C)  Heart Rate:  [59-86] 86  Resp:  [16-20] 18  BP: (116-160)/(42-70) 160/70    Physical Exam:    General Appearance:  Alert and cooperative.  No acute distress   Head:  Atraumatic and normocephalic.   Eyes: Conjunctivae and sclerae normal, no icterus. No pallor.   Ears:  Ears with no abnormalities noted.   Throat: No oral lesions, no thrush, oral mucosa moist.   Neck: Supple, trachea midline, no thyromegaly.   Back:   No kyphoscoliosis present. No tenderness to palpation.   Lungs:   Breath sounds heard bilaterally equally.  No crackles or wheezing. No Pleural rub or bronchial breathing.   Heart:  Normal S1 and S2, no murmur, no gallop, no rub. No JVD.   Abdomen:   Normal bowel sounds, no masses, no organomegaly. Soft, nontender, nondistended, no rebound tenderness.   Extremities: Supple, no edema, no cyanosis, no clubbing.   Pulses: Pulses palpable bilaterally.   Skin: No bleeding or rash.  Patient's right hip bandages are clean and intact.  There is no surrounding edema or erythema.   Neurologic: Alert and oriented x 3. No facial asymmetry. Moves all four limbs. No tremors.     Pertinent Lab Results:     Results from last 7 days   Lab Units 08/04/21  0612 08/03/21  0631 08/02/21  2336   SODIUM mmol/L 138 138 139   POTASSIUM mmol/L 3.7 4.1 3.8   CHLORIDE mmol/L 105 107 105   CO2  mmol/L 22.2 23.5 22.1   BUN mg/dL 13 16 19   CREATININE mg/dL 0.63 0.62 0.60   CALCIUM mg/dL 8.8 9.1 9.2   BILIRUBIN mg/dL  --   --  0.5   ALK PHOS U/L  --   --  91   ALT (SGPT) U/L  --   --  17   AST (SGOT) U/L  --   --  15   GLUCOSE mg/dL 117* 138* 138*     Results from last 7 days   Lab Units 08/05/21  0510 08/04/21  0612 08/03/21  0631   WBC 10*3/mm3 10.09 12.89* 11.89*   HEMOGLOBIN g/dL 10.1* 11.1* 11.8*   HEMATOCRIT % 31.8* 33.1* 35.0   PLATELETS 10*3/mm3 140 150 168     Results from last 7 days   Lab Units 08/02/21  2220   INR  0.97     Results from last 7 days   Lab Units 08/03/21  0631 08/02/21  2336   TROPONIN T ng/mL <0.010 <0.010                           Pertinent Radiology Results:    Imaging Results (All)     Procedure Component Value Units Date/Time    [facility] OR Procedure [[id number]] Resulted: 08/03/21 1259     Updated: 08/03/21 1259    XR Chest 1 View [[id number]] Collected: 08/03/21 0906     Updated: 08/03/21 1245    Narrative:      PROCEDURE: XR CHEST 1 VW        HISTORY: Fall, medical clearance     COMPARISON: None.     FINDINGS: The heart is normal in size. The mediastinum is unremarkable.  The lungs are clear. There is no pneumothorax. There are no acute  osseous abnormalities.       Impression:      No acute cardiopulmonary process.                 PROCEDURE: XR HIP W OR WO PELVIS 2-3 VIEW RIGHT-     HISTORY: Fall, medical clearance     COMPARISON: September 2017.     FINDINGS:  A 2 view exam demonstrates a right femoral neck fracture.   The joint spaces are preserved.  No soft tissue abnormality is seen.     IMPRESSION: Right femoral neck fracture.               Images were reviewed, interpreted, and dictated by Dr. Jonathan Connors M.D.  Transcribed by Carla Beauchamp PA-C.     This report was finalized on 8/3/2021 12:42 PM by Jonathan Connors M.D..    XR Hip With or Without Pelvis 2 - 3 View Right [[id number]] Collected: 08/03/21 0906     Updated: 08/03/21 1245    Narrative:       PROCEDURE: XR CHEST 1 VW        HISTORY: Fall, medical clearance     COMPARISON: None.     FINDINGS: The heart is normal in size. The mediastinum is unremarkable.  The lungs are clear. There is no pneumothorax. There are no acute  osseous abnormalities.       Impression:      No acute cardiopulmonary process.                 PROCEDURE: XR HIP W OR WO PELVIS 2-3 VIEW RIGHT-     HISTORY: Fall, medical clearance     COMPARISON: September 2017.     FINDINGS:  A 2 view exam demonstrates a right femoral neck fracture.   The joint spaces are preserved.  No soft tissue abnormality is seen.     IMPRESSION: Right femoral neck fracture.               Images were reviewed, interpreted, and dictated by Dr. Jonathan Connors M.D.  Transcribed by Carla Beauchamp PA-C.     This report was finalized on 8/3/2021 12:42 PM by Jonathan Connors M.D..    FL C Arm During Surgery [[id number]] Resulted: 08/03/21 1125     Updated: 08/03/21 1125    Narrative:      This procedure was auto-finalized with no dictation required.          Echo:    Results for orders placed during the hospital encounter of 09/09/20    Adult Transthoracic Echo Complete W/ Cont if Necessary Per Protocol    Interpretation Summary  · Mild tricuspid valve regurgitation is present.  · Estimated EF = 71%.  · Left ventricular systolic function is hyperdynamic (EF > 70).  · Left ventricular diastolic dysfunction (grade I) consistent with impaired relaxation.  · There is mild calcification of the aortic valve mainly affecting the non coronary cusp(s).  · Normal right ventricular cavity size, wall thickness, systolic function and septal motion noted.  · The aortic valve exhibits mild-moderate sclerosis.  · Moderate MAC is present.  · No evidence of pulmonary hypertension is present.  · There is no evidence of pericardial effusion.    Condition on Discharge:      Stable.    Code status during the hospital stay:    Code Status and Medical Interventions:   Ordered at:  08/03/21 1307     Code Status:    CPR     Medical Interventions (Level of Support Prior to Arrest):    Full     Discharge Disposition:    Rehab Facility or Unit (DC - External)    Discharge Medications:       Discharge Medications      New Medications      Instructions Start Date   enoxaparin 40 MG/0.4ML solution syringe  Commonly known as: LOVENOX   40 mg, Subcutaneous, Daily   Start Date: August 6, 2021        Continue These Medications      Instructions Start Date   amLODIPine 2.5 MG tablet  Commonly known as: NORVASC   2.5 mg, Oral, Daily      atorvastatin 40 MG tablet  Commonly known as: LIPITOR   Take 1 tablet by mouth once daily      ciclopirox 0.77 % cream  Commonly known as: LOPROX   Topical, Nightly, Clean nail with alcohol for 7 days prior to application      lisinopril 40 MG tablet  Commonly known as: PRINIVIL,ZESTRIL   40 mg, Oral, Daily      meclizine 12.5 MG tablet  Commonly known as: ANTIVERT   12.5 mg, Oral, 3 Times Daily PRN      omeprazole 20 MG capsule  Commonly known as: priLOSEC   Take 1 capsule by mouth once daily      torsemide 10 MG tablet  Commonly known as: DEMADEX   TAKE 1 TABLET BY MOUTH ONCE DAILY.  MAY  TAKE  AN  ADDITIONAL  10  MG  TABLET  AS  NEEDED  NO  MORE  THAN  ONCE  A  WEEK.      vitamin B-12 500 MCG tablet  Commonly known as: CYANOCOBALAMIN   500 mcg, Oral, Daily      vitamin D3 125 MCG (5000 UT) capsule capsule   5,000 Units, Oral, Daily           Discharge Diet:   As tolerated    Activity at Discharge:     As tolerated  Follow-up Appointments:    Follow-up Information     David Barrios MD Follow up in 1 month(s).    Specialty: Internal Medicine  Contact information:  [address]  [address]  [place] [id number] [phone]             Pelon Wooten MD Follow up in 2 week(s).    Specialty: Orthopedic Surgery  Contact information:  [address]  [place] [id number] [phone]                 Future Appointments   Date Time Provider Department  [place]   9/20/2021 10:30 AM Arron Farias MD [facility]   10/15/2021 10:30 AM David Barrios MD [facility]     Test Results Pending at Discharge:           Ale Leija DO  08/05/21  11:31 EDT    Time: I spent 45 minutes on this discharge activity which included: face-to-face encounter with the patient, reviewing the data in the system, coordination of the care with the nursing staff as well as consultants, documentation, and entering orders.     Dictated utilizing Dragon dictation.

## 2021-08-19 ENCOUNTER — READMISSION MANAGEMENT (OUTPATIENT)
Dept: CALL CENTER | Facility: HOSPITAL | Age: 86
End: 2021-08-19

## 2021-08-19 ENCOUNTER — TELEPHONE (OUTPATIENT)
Dept: INTERNAL MEDICINE | Facility: CLINIC | Age: 86
End: 2021-08-19

## 2021-08-19 NOTE — OUTREACH NOTE
Prep Survey      Responses   [facility] facility patient discharged from?  Non-BH   Is LACE score < 7 ?  Non-BH Discharge   Emergency Room discharge w/ pulse ox?  No   Eligibility  [facility]   Date of Admission  08/05/21   Date of Discharge  08/12/21   Discharge diagnosis  Rt Femur Fx   Does the patient have one of the following disease processes/diagnoses(primary or secondary)?  General Surgery   Prep survey completed?  Yes          Italia Nguyen RN

## 2021-08-20 ENCOUNTER — TRANSCRIBE ORDERS (OUTPATIENT)
Dept: HOME HEALTH SERVICES | Facility: HOME HEALTHCARE | Age: 86
End: 2021-08-20

## 2021-08-20 ENCOUNTER — HOME HEALTH ADMISSION (OUTPATIENT)
Dept: HOME HEALTH SERVICES | Facility: HOME HEALTHCARE | Age: 86
End: 2021-08-20

## 2021-08-20 ENCOUNTER — TRANSITIONAL CARE MANAGEMENT TELEPHONE ENCOUNTER (OUTPATIENT)
Dept: CALL CENTER | Facility: HOSPITAL | Age: 86
End: 2021-08-20

## 2021-08-20 DIAGNOSIS — S72.001D FRACTURE OF HIP, CLOSED, RIGHT, WITH ROUTINE HEALING, SUBSEQUENT ENCOUNTER: Primary | ICD-10-CM

## 2021-08-20 NOTE — OUTREACH NOTE
Call Center TCM Note      Responses   [facility] patient discharged from?  Non- [[facility] ]   Does the patient have one of the following disease processes/diagnoses(primary or secondary)?  General Surgery   TCM attempt successful?  Yes   Call start time  1048   Revoked Reason  Other [Patient reports that she has not been discharged yet from [facility]. States being discharged today. ]   Call end time  1049   Discharge diagnosis  Rt Femur Fx   Person spoke with today (if not patient) and relationship  patient, Sal Siu          Brie Amor RN    8/20/2021, 10:51 EDT

## 2021-08-21 ENCOUNTER — HOME CARE VISIT (OUTPATIENT)
Dept: HOME HEALTH SERVICES | Facility: HOME HEALTHCARE | Age: 86
End: 2021-08-21

## 2021-08-21 VITALS
WEIGHT: 149 LBS | TEMPERATURE: 98.2 F | HEART RATE: 78 BPM | SYSTOLIC BLOOD PRESSURE: 128 MMHG | DIASTOLIC BLOOD PRESSURE: 68 MMHG | HEIGHT: 64 IN | BODY MASS INDEX: 25.44 KG/M2

## 2021-08-21 PROCEDURE — G0151 HHCP-SERV OF PT,EA 15 MIN: HCPCS

## 2021-08-22 NOTE — HOME HEALTH
Reason for referral: Pt is an 85 yo female who fell in her basement and had resultant R femur fracture now s/p ORIF by Dr. Wooten on 8/3/2021 and is “TTWB to PWB”   Pt serves as primary caregiver for her . They have no children but a very supportive social support system.   Pt lives in single story home with full basement. Laundry is in the basement as well as additional living quarters. Primary living quarters are on first floor.  F/u with ortho should be 8.23.21, however out of confusion her  accidentally cancelled that appointment, so pt is going to f/u with ortho first think Monday morning and attempt to get a f/u appointment.   There are bathroom rennovations underway and she will have a walk in shower once completed.  Pt completed inpatient rehab stay at [facility] after d/c from [facility] post op and was d/c from [facility] yesterday, 8.20.21

## 2021-08-23 ENCOUNTER — HOME CARE VISIT (OUTPATIENT)
Dept: HOME HEALTH SERVICES | Facility: HOME HEALTHCARE | Age: 86
End: 2021-08-23

## 2021-08-23 PROCEDURE — G0152 HHCP-SERV OF OT,EA 15 MIN: HCPCS

## 2021-08-24 ENCOUNTER — OFFICE VISIT (OUTPATIENT)
Dept: ORTHOPEDIC SURGERY | Facility: CLINIC | Age: 86
End: 2021-08-24

## 2021-08-24 ENCOUNTER — HOME CARE VISIT (OUTPATIENT)
Dept: HOME HEALTH SERVICES | Facility: HOME HEALTHCARE | Age: 86
End: 2021-08-24

## 2021-08-24 VITALS — HEIGHT: 64 IN | WEIGHT: 149 LBS | RESPIRATION RATE: 18 BRPM | TEMPERATURE: 97.7 F | BODY MASS INDEX: 25.44 KG/M2

## 2021-08-24 VITALS
DIASTOLIC BLOOD PRESSURE: 64 MMHG | HEART RATE: 75 BPM | TEMPERATURE: 97.9 F | SYSTOLIC BLOOD PRESSURE: 136 MMHG | OXYGEN SATURATION: 97 %

## 2021-08-24 VITALS — SYSTOLIC BLOOD PRESSURE: 124 MMHG | HEART RATE: 76 BPM | OXYGEN SATURATION: 96 % | DIASTOLIC BLOOD PRESSURE: 68 MMHG

## 2021-08-24 VITALS
DIASTOLIC BLOOD PRESSURE: 64 MMHG | TEMPERATURE: 98.1 F | OXYGEN SATURATION: 97 % | SYSTOLIC BLOOD PRESSURE: 128 MMHG | HEART RATE: 70 BPM | RESPIRATION RATE: 16 BRPM

## 2021-08-24 DIAGNOSIS — S72.001A CLOSED TRAUMATIC NONDISPLACED FRACTURE OF NECK OF RIGHT FEMUR, INITIAL ENCOUNTER (HCC): Primary | ICD-10-CM

## 2021-08-24 PROCEDURE — G0157 HHC PT ASSISTANT EA 15: HCPCS

## 2021-08-24 PROCEDURE — 99024 POSTOP FOLLOW-UP VISIT: CPT | Performed by: PHYSICIAN ASSISTANT

## 2021-08-24 PROCEDURE — G0299 HHS/HOSPICE OF RN EA 15 MIN: HCPCS

## 2021-08-24 RX ORDER — POTASSIUM CHLORIDE 750 MG/1
TABLET, FILM COATED, EXTENDED RELEASE ORAL
COMMUNITY
Start: 2021-08-19 | End: 2021-08-26 | Stop reason: SDUPTHER

## 2021-08-24 RX ORDER — TORSEMIDE 5 MG/1
10 TABLET ORAL
COMMUNITY
Start: 2021-08-19 | End: 2021-10-15 | Stop reason: SDUPTHER

## 2021-08-24 NOTE — PROGRESS NOTES
Subjective   Patient ID: Sal Siu is a 86 y.o. right hand dominant female is here today for a post-operative visit.  Post-op of the Right Hip (Cannulated Screws Placement 8/3/21)          CHIEF COMPLAINT:      History of Present Illness      Pain controlled: [] no   [x] yes   Medication refill requested: [x] no   [] yes    Patient compliant with instructions: [] no   [x] yes   Other: Reports excellent progress since surgery.  Patient reports she has no pain.  She has been using a walker and performing toe-touch weightbearing.  She does receive home physical therapy     Past Medical History:   Diagnosis Date   • Arthritis    • Body piercing     EARS ONLY   • Cataract, right eye    • Chronic hypertension    • Constipation    • Dyslipidemia    • GERD (gastroesophageal reflux disease)    • Glaucoma    • Hx of migraines    • Hyperlipidemia    • Hypertensive cardiovascular disease    • Impaired functional mobility, balance, gait, and endurance 08/04/2021   • Incontinence in female    • Intermittent palpitations    • Obstructive sleep apnea     DOES NOT USE BPAP/ CPAP   • PONV (postoperative nausea and vomiting)    • UTI (urinary tract infection)    • Wears glasses    • Wears partial dentures     LOWER         Past Surgical History:   Procedure Laterality Date   • APPENDECTOMY  1946   • COLONOSCOPY     • DILATATION AND CURETTAGE  1970   • ENDOSCOPY     • EYE CAPSULOTOMY WITH LASER Left 5/22/2018    Procedure: EYE CAPSULOTOMY WITH LASER LEFT;  Surgeon: Meseret Simmons MD;  Location: [facility];  Service: Ophthalmology   • EYE SURGERY Bilateral     CATARACTS REMOVED   • HIP CANNULATED SCREW PLACEMENT Right 8/3/2021    Procedure: HIP CANNULATED SCREWS PLACEMENT, RIGHT;  Surgeon: Pelon Wooten MD;  Location: [facility];  Service: Orthopedics;  Laterality: Right;   • HYSTERECTOMY  1971    COMPLETE   • LAPAROSCOPIC CHOLECYSTECTOMY     • LIPOMA EXCISION      Lower back   • LUMBAR DISC SURGERY  1997   •  "OOPHORECTOMY         Allergies   Allergen Reactions   • Sulfa Antibiotics Swelling     urticaria   • Other Nausea And Vomiting     MUSHROOMS-CAUSE 'HALLUCINATIONS'       Review of Systems   Constitutional: Negative for diaphoresis, fever and unexpected weight change.   HENT: Negative for dental problem and sore throat.    Eyes: Negative for visual disturbance.   Respiratory: Negative for shortness of breath.    Cardiovascular: Negative for chest pain.   Gastrointestinal: Negative for abdominal pain, constipation, diarrhea, nausea and vomiting.   Genitourinary: Negative for difficulty urinating and frequency.   Neurological: Negative for headaches.   Hematological: Does not bruise/bleed easily.     I have reviewed the medical and surgical history, family history, social history, medications, and/or allergies, and the review of systems of this report.    Objective   Temp 97.7 °F (36.5 °C) (Skin)   Resp 18   Ht 162.6 cm (64\")   Wt 67.6 kg (149 lb)   LMP  (LMP Unknown)   BMI 25.58 kg/m²       Signs of infection: [x] no                    [] yes   Drainage: [x] no                    [] yes   Incision: [x] healing well     []healed well   Motor exam intact: [] no                    [x] yes   Neurovascular exam intact: [] no                    [x] yes   Signs of compartment syndrome: [x] no                    [] yes   Signs of DVT: [x] no                    [] yes   Other:      Physical Exam  Ortho Exam    Extremity DVT signs are negative on physical exam with negative Sandie sign, no calf pain, no palpable cords and no skin tone change  Neurologic Exam    Assessment/Plan     Independent Review of Radiographic Studies:      X-ray of the right hip 2 view performed in the office for the evaluation of fracture healing.  Comparison films are available reviewed.  No interval displacement  Procedures     Diagnoses and all orders for this visit:    1. Closed traumatic nondisplaced fracture of neck of right femur, initial " encounter (CMS/[facility]) (Primary)         Recommendations/Plan:     Sutures Staples or Pins [x] Removed today  [] At prior visit  [] Plan removal later   Physical therapy: []rehab facility  []outpatient referral  [x] therapy ongoing   Ultrasound: [x]not ordered         []order given to patient   Labs: [x]not ordered         []order given to patient   Weight Bearing status: []Full []WBAT [x]PWB []NWB []Other     Guided on proper techniques for mobility, strength, agility and/or conditioning exercises  Weight bearing parameters reviewed  Physical therapy program ongoing     Follow-up in 5 weeks x-ray on arrival  Patient is encouraged and agreeable to call or return sooner for any issues or concerns.

## 2021-08-26 ENCOUNTER — OFFICE VISIT (OUTPATIENT)
Dept: INTERNAL MEDICINE | Facility: CLINIC | Age: 86
End: 2021-08-26

## 2021-08-26 VITALS
HEART RATE: 68 BPM | HEIGHT: 64 IN | WEIGHT: 142.12 LBS | DIASTOLIC BLOOD PRESSURE: 60 MMHG | TEMPERATURE: 98 F | SYSTOLIC BLOOD PRESSURE: 120 MMHG | OXYGEN SATURATION: 99 % | BODY MASS INDEX: 24.26 KG/M2

## 2021-08-26 DIAGNOSIS — E53.8 B12 DEFICIENCY: ICD-10-CM

## 2021-08-26 DIAGNOSIS — I10 BENIGN ESSENTIAL HTN: Primary | ICD-10-CM

## 2021-08-26 DIAGNOSIS — M79.89 LEG SWELLING: ICD-10-CM

## 2021-08-26 DIAGNOSIS — S72.001D: ICD-10-CM

## 2021-08-26 PROBLEM — Z91.81 STATUS POST FALL: Status: RESOLVED | Noted: 2021-08-02 | Resolved: 2021-08-26

## 2021-08-26 PROCEDURE — 1111F DSCHRG MED/CURRENT MED MERGE: CPT | Performed by: INTERNAL MEDICINE

## 2021-08-26 PROCEDURE — 99214 OFFICE O/P EST MOD 30 MIN: CPT | Performed by: INTERNAL MEDICINE

## 2021-08-26 RX ORDER — GLUCOSAMINE/D3/BOSWELLIA SERRA 1500MG-400
TABLET ORAL AS NEEDED
COMMUNITY
End: 2022-05-17

## 2021-08-26 NOTE — PROGRESS NOTES
Transitional Care Follow Up Visit  Subjective     Sal Siu is a 86 y.o. female who presents for a transitional care management visit.    Within 48 business hours after discharge our office contacted her via telephone to coordinate her care and needs.      I reviewed and discussed the details of that call along with the discharge summary, hospital problems, inpatient lab results, inpatient diagnostic studies, and consultation reports with Sal.     Current outpatient and discharge medications have been reconciled for the patient.  Reviewed by: David Barrios MD      Date of TCM Phone Call 8/19/2021   [facility]   Date of Admission 8/5/2021   Date of Discharge 8/12/2021     Risk for Readmission (LACE) No data recorded    History of Present Illness   Course During Hospital Stay: 86-year-old female with a history of hypertension coming in for follow-up after recent right nondisplaced femoral neck fracture which was treated with ORIF and subsequently transferred to [facility] for rehab.  She is coming in accompanied by her friend who is also giving us some of the history.  Is doing well with physical therapy and currently using a walker intermittently.  Denies any significant pain.  She is compliant with her medications there is no leg swelling.  Her staples were removed about 2 days ago   The following portions of the patient's history were reviewed and updated as appropriate: allergies, current medications, past family history, past medical history, past social history, past surgical history and problem list.    Review of Systems   Constitutional: Negative.  Negative for activity change, appetite change, fatigue and fever.   HENT: Negative for congestion, ear discharge, ear pain and trouble swallowing.    Eyes: Negative for photophobia and visual disturbance.   Respiratory: Negative for cough and shortness of breath.    Cardiovascular: Negative for chest pain and palpitations.   Gastrointestinal:  Negative for abdominal distention, abdominal pain, constipation, diarrhea, nausea and vomiting.   Endocrine: Negative.    Genitourinary: Negative for dysuria, hematuria and urgency.   Musculoskeletal: Positive for arthralgias and gait problem. Negative for back pain, joint swelling and myalgias.   Skin: Negative for color change and rash.   Allergic/Immunologic: Negative.    Neurological: Negative for dizziness, weakness, light-headedness and headaches.   Hematological: Negative for adenopathy. Does not bruise/bleed easily.   Psychiatric/Behavioral: Positive for sleep disturbance. Negative for agitation, confusion and dysphoric mood. The patient is not nervous/anxious.        Objective   Physical Exam  Constitutional:       General: She is not in acute distress.     Appearance: She is well-developed.   HENT:      Nose: Nose normal.   Eyes:      General: No scleral icterus.     Conjunctiva/sclera: Conjunctivae normal.   Neck:      Thyroid: No thyromegaly.      Trachea: No tracheal deviation.   Cardiovascular:      Rate and Rhythm: Normal rate and regular rhythm.      Heart sounds: No murmur heard.   No friction rub.   Pulmonary:      Effort: No respiratory distress.      Breath sounds: No wheezing or rales.   Abdominal:      General: There is no distension.      Palpations: Abdomen is soft. There is no mass.      Tenderness: There is no abdominal tenderness. There is no guarding.   Musculoskeletal:         General: Deformity present. Normal range of motion.   Lymphadenopathy:      Cervical: No cervical adenopathy.   Skin:     General: Skin is warm and dry.      Findings: No erythema or rash.   Neurological:      Mental Status: She is alert and oriented to person, place, and time.      Cranial Nerves: No cranial nerve deficit.      Coordination: Coordination normal.      Gait: Gait abnormal.      Deep Tendon Reflexes: Reflexes are normal and symmetric.   Psychiatric:         Behavior: Behavior normal.         Thought  Content: Thought content normal.         Judgment: Judgment normal.         Assessment/Plan   Diagnoses and all orders for this visit:    1. Benign essential HTN (Primary) stable with current medications    2. Leg swelling longstanding history of lower extremity edema doing well now with leg elevation.  Uses diuretic as needed maybe once a week.  Will discontinue potassium supplement    3. Closed traumatic nondisplaced fracture of neck of right femur with routine healing, subsequent encounter stable wound has healed well continue with physical therapy    4. B12 deficiency stable on supplement follow levels

## 2021-08-27 ENCOUNTER — HOME CARE VISIT (OUTPATIENT)
Dept: HOME HEALTH SERVICES | Facility: HOME HEALTHCARE | Age: 86
End: 2021-08-27

## 2021-08-27 VITALS
OXYGEN SATURATION: 98 % | HEART RATE: 66 BPM | RESPIRATION RATE: 16 BRPM | TEMPERATURE: 98.1 F | DIASTOLIC BLOOD PRESSURE: 70 MMHG | SYSTOLIC BLOOD PRESSURE: 140 MMHG

## 2021-08-27 LAB
ALBUMIN SERPL-MCNC: 4.3 G/DL (ref 3.5–5.2)
ALBUMIN/GLOB SERPL: 1.7 G/DL
ALP SERPL-CCNC: 142 U/L (ref 39–117)
ALT SERPL-CCNC: 16 U/L (ref 1–33)
AST SERPL-CCNC: 15 U/L (ref 1–32)
BILIRUB SERPL-MCNC: 0.4 MG/DL (ref 0–1.2)
BUN SERPL-MCNC: 15 MG/DL (ref 8–23)
BUN/CREAT SERPL: 20.8 (ref 7–25)
CALCIUM SERPL-MCNC: 9.9 MG/DL (ref 8.6–10.5)
CHLORIDE SERPL-SCNC: 107 MMOL/L (ref 98–107)
CO2 SERPL-SCNC: 25.3 MMOL/L (ref 22–29)
CREAT SERPL-MCNC: 0.72 MG/DL (ref 0.57–1)
ERYTHROCYTE [DISTWIDTH] IN BLOOD BY AUTOMATED COUNT: 11.7 % (ref 12.3–15.4)
GLOBULIN SER CALC-MCNC: 2.6 GM/DL
GLUCOSE SERPL-MCNC: 100 MG/DL (ref 65–99)
HCT VFR BLD AUTO: 35 % (ref 34–46.6)
HGB BLD-MCNC: 11.4 G/DL (ref 12–15.9)
MCH RBC QN AUTO: 29.4 PG (ref 26.6–33)
MCHC RBC AUTO-ENTMCNC: 32.6 G/DL (ref 31.5–35.7)
MCV RBC AUTO: 90.2 FL (ref 79–97)
PLATELET # BLD AUTO: 309 10*3/MM3 (ref 140–450)
POTASSIUM SERPL-SCNC: 4.9 MMOL/L (ref 3.5–5.2)
PROT SERPL-MCNC: 6.9 G/DL (ref 6–8.5)
RBC # BLD AUTO: 3.88 10*6/MM3 (ref 3.77–5.28)
SODIUM SERPL-SCNC: 142 MMOL/L (ref 136–145)
WBC # BLD AUTO: 6.34 10*3/MM3 (ref 3.4–10.8)

## 2021-08-27 PROCEDURE — G0157 HHC PT ASSISTANT EA 15: HCPCS

## 2021-08-30 ENCOUNTER — HOME CARE VISIT (OUTPATIENT)
Dept: HOME HEALTH SERVICES | Facility: HOME HEALTHCARE | Age: 86
End: 2021-08-30

## 2021-08-30 VITALS
HEART RATE: 69 BPM | RESPIRATION RATE: 14 BRPM | TEMPERATURE: 98.6 F | OXYGEN SATURATION: 96 % | DIASTOLIC BLOOD PRESSURE: 60 MMHG | SYSTOLIC BLOOD PRESSURE: 120 MMHG

## 2021-08-30 PROCEDURE — G0151 HHCP-SERV OF PT,EA 15 MIN: HCPCS

## 2021-09-01 ENCOUNTER — HOME CARE VISIT (OUTPATIENT)
Dept: HOME HEALTH SERVICES | Facility: HOME HEALTHCARE | Age: 86
End: 2021-09-01

## 2021-09-01 VITALS
TEMPERATURE: 98.7 F | RESPIRATION RATE: 14 BRPM | DIASTOLIC BLOOD PRESSURE: 58 MMHG | HEART RATE: 64 BPM | OXYGEN SATURATION: 96 % | SYSTOLIC BLOOD PRESSURE: 110 MMHG

## 2021-09-01 VITALS
DIASTOLIC BLOOD PRESSURE: 60 MMHG | HEART RATE: 75 BPM | TEMPERATURE: 97.3 F | SYSTOLIC BLOOD PRESSURE: 126 MMHG | RESPIRATION RATE: 20 BRPM | OXYGEN SATURATION: 99 %

## 2021-09-01 PROCEDURE — G0157 HHC PT ASSISTANT EA 15: HCPCS

## 2021-09-01 PROCEDURE — G0300 HHS/HOSPICE OF LPN EA 15 MIN: HCPCS

## 2021-09-02 ENCOUNTER — HOME CARE VISIT (OUTPATIENT)
Dept: HOME HEALTH SERVICES | Facility: HOME HEALTHCARE | Age: 86
End: 2021-09-02

## 2021-09-02 VITALS — DIASTOLIC BLOOD PRESSURE: 64 MMHG | SYSTOLIC BLOOD PRESSURE: 122 MMHG | OXYGEN SATURATION: 98 % | HEART RATE: 88 BPM

## 2021-09-02 PROCEDURE — G0152 HHCP-SERV OF OT,EA 15 MIN: HCPCS

## 2021-09-07 ENCOUNTER — HOME CARE VISIT (OUTPATIENT)
Dept: HOME HEALTH SERVICES | Facility: HOME HEALTHCARE | Age: 86
End: 2021-09-07

## 2021-09-07 VITALS
RESPIRATION RATE: 20 BRPM | OXYGEN SATURATION: 98 % | HEART RATE: 70 BPM | SYSTOLIC BLOOD PRESSURE: 125 MMHG | DIASTOLIC BLOOD PRESSURE: 70 MMHG | TEMPERATURE: 97 F

## 2021-09-07 PROCEDURE — G0299 HHS/HOSPICE OF RN EA 15 MIN: HCPCS

## 2021-09-08 ENCOUNTER — HOME CARE VISIT (OUTPATIENT)
Dept: HOME HEALTH SERVICES | Facility: HOME HEALTHCARE | Age: 86
End: 2021-09-08

## 2021-09-08 VITALS
OXYGEN SATURATION: 96 % | DIASTOLIC BLOOD PRESSURE: 60 MMHG | SYSTOLIC BLOOD PRESSURE: 138 MMHG | RESPIRATION RATE: 14 BRPM | HEART RATE: 67 BPM | TEMPERATURE: 98.5 F

## 2021-09-08 PROCEDURE — G0157 HHC PT ASSISTANT EA 15: HCPCS

## 2021-09-08 NOTE — CASE COMMUNICATION
Patient was discharged from Skilled nursing services effective 9.7.2021. All goals met. Patient will continue with PT/OT services.

## 2021-09-09 ENCOUNTER — HOME CARE VISIT (OUTPATIENT)
Dept: HOME HEALTH SERVICES | Facility: HOME HEALTHCARE | Age: 86
End: 2021-09-09

## 2021-09-09 PROCEDURE — G0152 HHCP-SERV OF OT,EA 15 MIN: HCPCS

## 2021-09-10 ENCOUNTER — HOME CARE VISIT (OUTPATIENT)
Dept: HOME HEALTH SERVICES | Facility: HOME HEALTHCARE | Age: 86
End: 2021-09-10

## 2021-09-10 VITALS
TEMPERATURE: 98 F | DIASTOLIC BLOOD PRESSURE: 60 MMHG | SYSTOLIC BLOOD PRESSURE: 128 MMHG | OXYGEN SATURATION: 98 % | HEART RATE: 66 BPM | RESPIRATION RATE: 16 BRPM

## 2021-09-10 PROCEDURE — G0157 HHC PT ASSISTANT EA 15: HCPCS

## 2021-09-12 VITALS — SYSTOLIC BLOOD PRESSURE: 130 MMHG | DIASTOLIC BLOOD PRESSURE: 66 MMHG | HEART RATE: 71 BPM | OXYGEN SATURATION: 98 %

## 2021-09-14 ENCOUNTER — HOME CARE VISIT (OUTPATIENT)
Dept: HOME HEALTH SERVICES | Facility: HOME HEALTHCARE | Age: 86
End: 2021-09-14

## 2021-09-14 VITALS
DIASTOLIC BLOOD PRESSURE: 58 MMHG | OXYGEN SATURATION: 98 % | RESPIRATION RATE: 14 BRPM | HEART RATE: 64 BPM | SYSTOLIC BLOOD PRESSURE: 132 MMHG | TEMPERATURE: 98.8 F

## 2021-09-14 PROCEDURE — G0157 HHC PT ASSISTANT EA 15: HCPCS

## 2021-09-16 ENCOUNTER — HOME CARE VISIT (OUTPATIENT)
Dept: HOME HEALTH SERVICES | Facility: HOME HEALTHCARE | Age: 86
End: 2021-09-16

## 2021-09-16 VITALS — SYSTOLIC BLOOD PRESSURE: 122 MMHG | DIASTOLIC BLOOD PRESSURE: 78 MMHG | OXYGEN SATURATION: 96 % | HEART RATE: 76 BPM

## 2021-09-16 PROCEDURE — G0152 HHCP-SERV OF OT,EA 15 MIN: HCPCS

## 2021-09-16 PROCEDURE — G0180 MD CERTIFICATION HHA PATIENT: HCPCS | Performed by: INTERNAL MEDICINE

## 2021-09-17 ENCOUNTER — HOME CARE VISIT (OUTPATIENT)
Dept: HOME HEALTH SERVICES | Facility: HOME HEALTHCARE | Age: 86
End: 2021-09-17

## 2021-09-17 PROCEDURE — G0151 HHCP-SERV OF PT,EA 15 MIN: HCPCS

## 2021-09-18 VITALS
OXYGEN SATURATION: 95 % | SYSTOLIC BLOOD PRESSURE: 138 MMHG | HEART RATE: 78 BPM | TEMPERATURE: 98.6 F | DIASTOLIC BLOOD PRESSURE: 70 MMHG | RESPIRATION RATE: 18 BRPM

## 2021-09-18 NOTE — CASE COMMUNICATION
Pt cont to be PWB RLE and should not be performing closed chain standing activity on the RLE while moving the LLE. She told me she was doing standing ex for BLE which included standing on the RLE and moving the LLE. I emphasized that she should NOT be doing that. Per the goal, until WB status is progressed, pt is only to be doing standing open chain exercises with the RLE.

## 2021-09-19 NOTE — PROGRESS NOTES
[facility] Cardiology  Office Progress Note  Sal Siu  1935  [address] [address]       Visit Date: 09/20/21    PCP: David Barrios MD  [address]  [place] [id number]    IDENTIFICATION: A 86 y.o. female  white female, retired /supervisor, from [place], [place].  Former [facility] pt 2020    PROBLEM LIST:   1. Chronic hypertension, probably essential.  2. Hypertensive cardiovascular disease:  a. 2007 Acceptable quantitative SPECT gated Cardiolite GXT with reduced exercise capacity, acceptable LVEF (0.80)   3. 9/2020 echocardiogram EF 71% AV sclerosis/MAC rvsp 35  4. Intermittent palpitations.   5. Dyslipidemia.   1. 3/19 LDL: 53  2. 9/20 LDL 47  6. Right infrascapular arthritis type pain with corticosteroid injection (data deficit), February 2009.  7. 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  10. Remote operations:  a. Appendectomy, 1946.  b. Lower back apparent lipoma removal (data deficit).   c. Dilation and curettage, 1970.  d. Hysterectomy, 1971.  e. Lumbar disc surgery, 1997.  f. Laparoscopic cholecystectomy (data deficit).   g. Posterior capsular opacification, left eye, May 2018  h. 8/21 ORIF Cervoni (screw placement) right hip fracture s/p fall.      CC:   Chief Complaint   Patient presents with   • Benign essential HTN       Allergies  Allergies   Allergen Reactions   • Sulfa Antibiotics Swelling     urticaria   • Other Nausea And Vomiting     MUSHROOMS-CAUSE 'HALLUCINATIONS'       Current Medications    Current Outpatient Medications:   •  amLODIPine (NORVASC) 2.5 MG tablet, Take 1 tablet by mouth Daily., Disp: 30 tablet, Rfl: 5  •  atorvastatin (LIPITOR) 40 MG tablet, Take 1 tablet by mouth once daily, Disp: 90 tablet, Rfl: 3  •  Biotin 55948 MCG tablet, Take  by mouth As Needed., Disp: , Rfl:   •  Cholecalciferol (VITAMIN D3) 5000 units capsule capsule, Take 1 capsule by mouth Daily.,  "Disp: 30 capsule, Rfl: 6  •  ciclopirox (LOPROX) 0.77 % cream, Apply  topically to the appropriate area as directed Every Night. Clean nail with alcohol for 7 days prior to application, Disp: 90 g, Rfl: 2  •  lisinopril (PRINIVIL,ZESTRIL) 40 MG tablet, Take 1 tablet by mouth Daily., Disp: 30 tablet, Rfl: 5  •  meclizine (ANTIVERT) 12.5 MG tablet, Take 1 tablet by mouth 3 (Three) Times a Day As Needed for Dizziness., Disp: 30 tablet, Rfl: 3  •  omeprazole (priLOSEC) 20 MG capsule, Take 1 capsule by mouth once daily, Disp: 90 capsule, Rfl: 3  •  torsemide (DEMADEX) 5 MG tablet, 10 mg. As needed, Disp: , Rfl:   •  vitamin B-12 (CYANOCOBALAMIN) 500 MCG tablet, Take 500 mcg by mouth Daily., Disp: , Rfl:   •  potassium chloride (K-DUR,KLOR-CON) 10 MEQ CR tablet, Take 10 mEq by mouth Daily., Disp: , Rfl:       History of Present Illness   Sal Siu is a 86 y.o. year old female here for follow up.  He is transitioning care from Dr. Land's practice to [facility] for geographic convenience.  Pt denies any chest pain, dyspnea, dyspnea on exertion, orthopnea, PND, palpitations, lower extremity edema, or claudication.  She continues to recover following her hip fracture and surgery last month at [facility].  Her daughter accompanies her in the office states that she has baseline shortness of breath      OBJECTIVE:  Vitals:    09/20/21 1021   BP: 124/60   BP Location: Left arm   Patient Position: Sitting   Pulse: 67   SpO2: 98%   Weight: 64.4 kg (142 lb)   Height: 156.2 cm (61.5\")     Body mass index is 26.4 kg/m².    Constitutional:       Appearance: Healthy appearance. Not in distress.   Neck:      Vascular: No JVR. JVD normal.   Pulmonary:      Effort: Pulmonary effort is normal.      Breath sounds: Normal breath sounds. No wheezing. No rhonchi. No rales.   Chest:      Chest wall: Not tender to palpatation.   Cardiovascular:      PMI at left midclavicular line. Normal rate. Regular rhythm. Normal S1. Normal " S2.      Murmurs: There is a systolic murmur.      No gallop. No click. No rub.   Pulses:     Intact distal pulses.   Edema:     Peripheral edema absent.   Abdominal:      General: Bowel sounds are normal.      Palpations: Abdomen is soft.      Tenderness: There is no abdominal tenderness.   Musculoskeletal: Normal range of motion.         General: No tenderness. Skin:     General: Skin is warm and dry.   Neurological:      General: No focal deficit present.      Mental Status: Alert and oriented to person, place and time.         Diagnostic Data:  Procedures      ASSESSMENT:   Diagnosis Plan   1. MOSELEY (dyspnea on exertion)     2. Essential hypertension     3. Mixed hyperlipidemia         PLAN:  Dyspnea multifactorial with valvular sclerosis we will follow-up echocardiogram in the next 2 years.  Likely element of diastolic dysfunction hypertensive heart disease    Hypertension controlled lisinopril amlodipine    Dyslipidemia on statin therapy          Arron Farias MD, FACC

## 2021-09-20 ENCOUNTER — OFFICE VISIT (OUTPATIENT)
Dept: CARDIOLOGY | Facility: CLINIC | Age: 86
End: 2021-09-20

## 2021-09-20 VITALS
HEIGHT: 62 IN | SYSTOLIC BLOOD PRESSURE: 124 MMHG | OXYGEN SATURATION: 98 % | HEART RATE: 67 BPM | WEIGHT: 142 LBS | BODY MASS INDEX: 26.13 KG/M2 | DIASTOLIC BLOOD PRESSURE: 60 MMHG

## 2021-09-20 DIAGNOSIS — R06.09 DOE (DYSPNEA ON EXERTION): Primary | ICD-10-CM

## 2021-09-20 DIAGNOSIS — I10 ESSENTIAL HYPERTENSION: ICD-10-CM

## 2021-09-20 DIAGNOSIS — E78.2 MIXED HYPERLIPIDEMIA: ICD-10-CM

## 2021-09-20 PROBLEM — Z74.09 IMPAIRED MOBILITY: Status: ACTIVE | Noted: 2021-09-20

## 2021-09-20 PROBLEM — R68.89 TOTAL SELF-CARE DEFICIT: Status: ACTIVE | Noted: 2021-09-20

## 2021-09-20 PROBLEM — M54.50 LOW BACK PAIN: Status: ACTIVE | Noted: 2021-09-20

## 2021-09-20 PROBLEM — G56.00 CARPAL TUNNEL SYNDROME: Status: ACTIVE | Noted: 2021-09-20

## 2021-09-20 PROBLEM — N39.0 URINARY TRACT INFECTIOUS DISEASE: Status: ACTIVE | Noted: 2021-09-20

## 2021-09-20 PROBLEM — J06.9 ACUTE UPPER RESPIRATORY INFECTION: Status: ACTIVE | Noted: 2021-09-20

## 2021-09-20 PROBLEM — H61.20 IMPACTED CERUMEN: Status: ACTIVE | Noted: 2021-09-20

## 2021-09-20 PROBLEM — M25.559 HIP PAIN: Status: ACTIVE | Noted: 2021-09-20

## 2021-09-20 PROBLEM — E78.00 PURE HYPERCHOLESTEROLEMIA: Status: ACTIVE | Noted: 2021-09-20

## 2021-09-20 PROBLEM — R53.83 MALAISE AND FATIGUE: Status: ACTIVE | Noted: 2021-09-20

## 2021-09-20 PROBLEM — M51.06 LUMBAR DISC DISORDER WITH MYELOPATHY: Status: ACTIVE | Noted: 2021-09-20

## 2021-09-20 PROBLEM — H81.10 BENIGN PAROXYSMAL POSITIONAL VERTIGO: Status: ACTIVE | Noted: 2021-09-20

## 2021-09-20 PROBLEM — R53.81 MALAISE AND FATIGUE: Status: ACTIVE | Noted: 2021-09-20

## 2021-09-20 PROBLEM — R06.00 DYSPNEA: Status: ACTIVE | Noted: 2021-09-20

## 2021-09-20 PROBLEM — Z91.89 AT RISK FOR VENOUS THROMBOEMBOLISM (VTE): Status: ACTIVE | Noted: 2021-08-05

## 2021-09-20 PROBLEM — J20.9 ACUTE BRONCHITIS: Status: ACTIVE | Noted: 2021-09-20

## 2021-09-20 PROBLEM — E87.6 HYPOKALEMIA: Status: ACTIVE | Noted: 2021-09-20

## 2021-09-20 PROCEDURE — 99214 OFFICE O/P EST MOD 30 MIN: CPT | Performed by: INTERNAL MEDICINE

## 2021-09-21 ENCOUNTER — OFFICE VISIT (OUTPATIENT)
Dept: ORTHOPEDIC SURGERY | Facility: CLINIC | Age: 86
End: 2021-09-21

## 2021-09-21 VITALS — WEIGHT: 143 LBS | RESPIRATION RATE: 18 BRPM | TEMPERATURE: 94.6 F | BODY MASS INDEX: 27 KG/M2 | HEIGHT: 61 IN

## 2021-09-21 DIAGNOSIS — S72.001A CLOSED TRAUMATIC NONDISPLACED FRACTURE OF NECK OF RIGHT FEMUR, INITIAL ENCOUNTER (HCC): Primary | ICD-10-CM

## 2021-09-21 PROCEDURE — 99024 POSTOP FOLLOW-UP VISIT: CPT | Performed by: PHYSICIAN ASSISTANT

## 2021-09-21 NOTE — PROGRESS NOTES
Subjective   Patient ID: Sal Siu is a 86 y.o. right hand dominant female is here today for a post-operative visit.  Post-op of the Right Hip (Hip Cannulated Screws Placement 8/3/21. Has been doing exercises at home. States its doing good but does have some pain.)          CHIEF COMPLAINT:    Post op right hip femur fracture.     History of Present Illness      Pain controlled: [] no   [x] yes   Medication refill requested: [x] no   [] yes    Patient compliant with instructions: [] no   [x] yes   Other: Reports good progress since surgery.     Past Medical History:   Diagnosis Date   • Arthritis    • Body piercing     EARS ONLY   • Cataract, right eye    • Chronic hypertension    • Constipation    • Dyslipidemia    • GERD (gastroesophageal reflux disease)    • Glaucoma    • Hx of migraines    • Hyperlipidemia    • Hypertensive cardiovascular disease    • Impaired functional mobility, balance, gait, and endurance 08/04/2021   • Incontinence in female    • Intermittent palpitations    • Obstructive sleep apnea     DOES NOT USE BPAP/ CPAP   • PONV (postoperative nausea and vomiting)    • UTI (urinary tract infection)    • Wears glasses    • Wears partial dentures     LOWER         Past Surgical History:   Procedure Laterality Date   • APPENDECTOMY  1946   • COLONOSCOPY     • DILATATION AND CURETTAGE  1970   • ENDOSCOPY     • EYE CAPSULOTOMY WITH LASER Left 5/22/2018    Procedure: EYE CAPSULOTOMY WITH LASER LEFT;  Surgeon: Meseret Simmons MD;  Location: [facility];  Service: Ophthalmology   • EYE SURGERY Bilateral     CATARACTS REMOVED   • HIP CANNULATED SCREW PLACEMENT Right 8/3/2021    Procedure: HIP CANNULATED SCREWS PLACEMENT, RIGHT;  Surgeon: Pelon Wooten MD;  Location: [facility];  Service: Orthopedics;  Laterality: Right;   • HYSTERECTOMY  1971    COMPLETE   • LAPAROSCOPIC CHOLECYSTECTOMY     • LIPOMA EXCISION      Lower back   • LUMBAR DISC SURGERY  1997   • OOPHORECTOMY         Allergies  "  Allergen Reactions   • Sulfa Antibiotics Swelling     urticaria   • Other Nausea And Vomiting     MUSHROOMS-CAUSE 'HALLUCINATIONS'       Review of Systems   Constitutional: Negative for fever.   HENT: Negative for dental problem and voice change.    Eyes: Negative for visual disturbance.   Respiratory: Negative for shortness of breath.    Cardiovascular: Negative for chest pain.   Gastrointestinal: Negative for abdominal pain.   Genitourinary: Negative for dysuria.   Musculoskeletal: Positive for arthralgias (right hip) and joint swelling (bilateral ankle ). Negative for gait problem.   Skin: Negative for rash.   Neurological: Negative for speech difficulty.   Hematological: Does not bruise/bleed easily.   Psychiatric/Behavioral: Negative for confusion.     I have reviewed the medical and surgical history, family history, social history, medications, and/or allergies, and the review of systems of this report.    Objective   Temp 94.6 °F (34.8 °C)   Resp 18   Ht 156.2 cm (61.5\")   Wt 64.9 kg (143 lb)   LMP  (LMP Unknown)   BMI 26.59 kg/m²       Signs of infection: [x] no                    [] yes   Drainage: [x] no                    [] yes   Incision: [x] healing well     []healed well   Motor exam intact: [] no                    [x] yes   Neurovascular exam intact: [] no                    [x] yes   Signs of compartment syndrome: [x] no                    [] yes   Signs of DVT: [x] no                    [] yes   Other:      Physical Exam  Ortho Exam    Extremity DVT signs are negative on physical exam with negative Sandie sign, no calf pain, no palpable cords and no skin tone change  Neurologic Exam    Assessment/Plan     Independent Review of Radiographic Studies:    AP and lateral of the right hip, indication to evaluate fracture healing, and compared with prior imaging, shows interim fracture healing, callus and or periostitis with good maintained reduction and alignment and intact position of fracture " fixation hardware.    Laboratory and Other Studies:  No new results reviewed today.     Medical Decision Making:    Stable neurovascular exam.     Procedures     Diagnoses and all orders for this visit:    1. Closed traumatic nondisplaced fracture of neck of right femur, initial encounter (CMS/[facility]) (Primary)  -     XR Hip With or Without Pelvis 2 - 3 View Right; Future  -     Ambulatory Referral to Physical Therapy Evaluate and treat, Ortho, POST OP         Recommendations/Plan:     Sutures Staples or Pins [] Removed today  [x] At prior visit  [] Plan removal later   Physical therapy: []rehab facility  [x]outpatient referral  [] therapy ongoing   Ultrasound: [x]not ordered         []order given to patient   Labs: [x]not ordered         []order given to patient   Weight Bearing status: []Full [x]WBAT []PWB []NWB []Other     Ice, heat, and/or modalities as beneficial  Weight bearing parameters reviewed  Physical therapy referral given   follow up 6 weeks XOA    Patient is encouraged and agreeable to call or return sooner for any issues or concerns.

## 2021-09-22 ENCOUNTER — HOME CARE VISIT (OUTPATIENT)
Dept: HOME HEALTH SERVICES | Facility: HOME HEALTHCARE | Age: 86
End: 2021-09-22

## 2021-09-22 VITALS
OXYGEN SATURATION: 98 % | DIASTOLIC BLOOD PRESSURE: 55 MMHG | SYSTOLIC BLOOD PRESSURE: 139 MMHG | RESPIRATION RATE: 16 BRPM | HEART RATE: 68 BPM

## 2021-09-22 PROCEDURE — G0157 HHC PT ASSISTANT EA 15: HCPCS

## 2021-09-24 ENCOUNTER — HOME CARE VISIT (OUTPATIENT)
Dept: HOME HEALTH SERVICES | Facility: HOME HEALTHCARE | Age: 86
End: 2021-09-24

## 2021-09-24 PROCEDURE — G0151 HHCP-SERV OF PT,EA 15 MIN: HCPCS

## 2021-09-25 NOTE — CASE COMMUNICATION
Pt has been d/c from home care PT as of 9.24.2021 with condition improved, goals met and has an outpatient PT appointment confirmed next week.  Pt is stable, remains in the home and under physican care.  Thank you for this referral.  Cristiane Bran, PT

## 2021-09-30 ENCOUNTER — FLU SHOT (OUTPATIENT)
Dept: INTERNAL MEDICINE | Facility: CLINIC | Age: 86
End: 2021-09-30

## 2021-09-30 DIAGNOSIS — Z23 NEED FOR INFLUENZA VACCINATION: Primary | ICD-10-CM

## 2021-09-30 PROCEDURE — 90662 IIV NO PRSV INCREASED AG IM: CPT | Performed by: INTERNAL MEDICINE

## 2021-09-30 PROCEDURE — G0008 ADMIN INFLUENZA VIRUS VAC: HCPCS | Performed by: INTERNAL MEDICINE

## 2021-10-13 RX ORDER — ATORVASTATIN CALCIUM 40 MG/1
TABLET, FILM COATED ORAL
Qty: 90 TABLET | Refills: 0 | Status: SHIPPED | OUTPATIENT
Start: 2021-10-13 | End: 2022-03-05

## 2021-10-15 ENCOUNTER — OFFICE VISIT (OUTPATIENT)
Dept: INTERNAL MEDICINE | Facility: CLINIC | Age: 86
End: 2021-10-15

## 2021-10-15 VITALS
BODY MASS INDEX: 26.09 KG/M2 | TEMPERATURE: 97.8 F | HEART RATE: 60 BPM | DIASTOLIC BLOOD PRESSURE: 60 MMHG | HEIGHT: 62 IN | SYSTOLIC BLOOD PRESSURE: 118 MMHG | OXYGEN SATURATION: 97 % | WEIGHT: 141.8 LBS

## 2021-10-15 DIAGNOSIS — I10 BENIGN ESSENTIAL HTN: ICD-10-CM

## 2021-10-15 DIAGNOSIS — B35.1 ONYCHOMYCOSIS: ICD-10-CM

## 2021-10-15 DIAGNOSIS — Z23 NEED FOR VACCINATION: Primary | ICD-10-CM

## 2021-10-15 DIAGNOSIS — E53.8 B12 DEFICIENCY: ICD-10-CM

## 2021-10-15 PROBLEM — H81.10 BENIGN PAROXYSMAL POSITIONAL VERTIGO: Status: RESOLVED | Noted: 2021-09-20 | Resolved: 2021-10-15

## 2021-10-15 PROBLEM — N39.0 URINARY TRACT INFECTIOUS DISEASE: Status: RESOLVED | Noted: 2021-09-20 | Resolved: 2021-10-15

## 2021-10-15 PROBLEM — M79.89 LEG SWELLING: Status: RESOLVED | Noted: 2021-03-25 | Resolved: 2021-10-15

## 2021-10-15 PROBLEM — Z91.89 AT RISK FOR VENOUS THROMBOEMBOLISM (VTE): Status: RESOLVED | Noted: 2021-08-05 | Resolved: 2021-10-15

## 2021-10-15 PROBLEM — R53.83 MALAISE AND FATIGUE: Status: RESOLVED | Noted: 2021-09-20 | Resolved: 2021-10-15

## 2021-10-15 PROBLEM — J20.9 ACUTE BRONCHITIS: Status: RESOLVED | Noted: 2021-09-20 | Resolved: 2021-10-15
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
## 2021-10-15 PROBLEM — R68.89 TOTAL SELF-CARE DEFICIT: Status: RESOLVED | Noted: 2021-09-20 | Resolved: 2021-10-15

## 2021-10-15 PROBLEM — J06.9 ACUTE UPPER RESPIRATORY INFECTION: Status: RESOLVED | Noted: 2021-09-20 | Resolved: 2021-10-15

## 2021-10-15 PROBLEM — E78.00 PURE HYPERCHOLESTEROLEMIA: Status: RESOLVED | Noted: 2021-09-20 | Resolved: 2021-10-15

## 2021-10-15 PROBLEM — Z74.09 IMPAIRED MOBILITY: Status: RESOLVED | Noted: 2021-09-20 | Resolved: 2021-10-15

## 2021-10-15 PROBLEM — E87.6 HYPOKALEMIA: Status: RESOLVED | Noted: 2021-09-20 | Resolved: 2021-10-15

## 2021-10-15 PROBLEM — E78.2 MIXED HYPERLIPIDEMIA: Status: RESOLVED | Noted: 2021-03-25 | Resolved: 2021-10-15

## 2021-10-15 PROBLEM — M51.06 LUMBAR DISC DISORDER WITH MYELOPATHY: Status: RESOLVED | Noted: 2021-09-20 | Resolved: 2021-10-15

## 2021-10-15 PROBLEM — H61.20 IMPACTED CERUMEN: Status: RESOLVED | Noted: 2021-09-20 | Resolved: 2021-10-15

## 2021-10-15 PROBLEM — R53.81 MALAISE AND FATIGUE: Status: RESOLVED | Noted: 2021-09-20 | Resolved: 2021-10-15

## 2021-10-15 PROBLEM — M54.50 LOW BACK PAIN: Status: RESOLVED | Noted: 2021-09-20 | Resolved: 2021-10-15

## 2021-10-15 PROBLEM — M25.559 HIP PAIN: Status: RESOLVED | Noted: 2021-09-20 | Resolved: 2021-10-15

## 2021-10-15 PROBLEM — R06.00 DYSPNEA: Status: RESOLVED | Noted: 2021-09-20 | Resolved: 2021-10-15

## 2021-10-15 PROBLEM — G56.00 CARPAL TUNNEL SYNDROME: Status: RESOLVED | Noted: 2021-09-20 | Resolved: 2021-10-15

## 2021-10-15 PROCEDURE — G0439 PPPS, SUBSEQ VISIT: HCPCS | Performed by: INTERNAL MEDICINE

## 2021-10-15 PROCEDURE — 99397 PER PM REEVAL EST PAT 65+ YR: CPT | Performed by: INTERNAL MEDICINE

## 2021-10-15 PROCEDURE — 0001A COVID-19 (PFIZER): CPT | Performed by: INTERNAL MEDICINE

## 2021-10-15 PROCEDURE — 1159F MED LIST DOCD IN RCRD: CPT | Performed by: INTERNAL MEDICINE

## 2021-10-15 PROCEDURE — 1170F FXNL STATUS ASSESSED: CPT | Performed by: INTERNAL MEDICINE

## 2021-10-15 PROCEDURE — 91300 COVID-19 (PFIZER): CPT | Performed by: INTERNAL MEDICINE

## 2021-10-15 RX ORDER — OMEPRAZOLE 20 MG/1
20 CAPSULE, DELAYED RELEASE ORAL DAILY
Qty: 90 CAPSULE | Refills: 3 | Status: SHIPPED | OUTPATIENT
Start: 2021-10-15

## 2021-10-15 RX ORDER — LISINOPRIL 40 MG/1
40 TABLET ORAL DAILY
Qty: 90 TABLET | Refills: 3 | Status: SHIPPED | OUTPATIENT
Start: 2021-10-15

## 2021-10-15 RX ORDER — TORSEMIDE 5 MG/1
5 TABLET ORAL DAILY PRN
Qty: 20 TABLET | Refills: 0 | Status: SHIPPED | OUTPATIENT
Start: 2021-10-15 | End: 2022-08-10

## 2021-10-15 NOTE — PROGRESS NOTES
The ABCs of the Annual Wellness Visit  Subsequent Medicare Wellness Visit    Chief Complaint   Patient presents with   • Medicare Wellness-subsequent      Subjective    History of Present Illness:  Sal Siu is a 86 y.o. female who presents for a Subsequent Medicare Wellness Visit.    The following portions of the patient's history were reviewed and   updated as appropriate: allergies, current medications, past family history, past medical history, past social history, past surgical history and problem list.    Compared to one year ago, the patient feels her physical   health is worse.    Compared to one year ago, the patient feels her mental   health is worse.    Recent Hospitalizations:  This patient has had a [facility] admission record on file within the last 365 days.    Current Medical Providers:  Patient Care Team:  David Barrios MD as PCP - General (Internal Medicine)  Arron Farias MD as Consulting Physician (Cardiology)  Olivier Andrade PA-C as Physician Assistant (Physician Assistant)    Outpatient Medications Prior to Visit   Medication Sig Dispense Refill   • amLODIPine (NORVASC) 2.5 MG tablet Take 1 tablet by mouth Daily. 30 tablet 5   • atorvastatin (LIPITOR) 40 MG tablet Take 1 tablet by mouth once daily 90 tablet 0   • Biotin 57726 MCG tablet Take  by mouth As Needed.     • Cholecalciferol (VITAMIN D3) 5000 units capsule capsule Take 1 capsule by mouth Daily. 30 capsule 6   • ciclopirox (LOPROX) 0.77 % cream Apply  topically to the appropriate area as directed Every Night. Clean nail with alcohol for 7 days prior to application 90 g 2   • lisinopril (PRINIVIL,ZESTRIL) 40 MG tablet Take 1 tablet by mouth Daily. 30 tablet 5   • meclizine (ANTIVERT) 12.5 MG tablet Take 1 tablet by mouth 3 (Three) Times a Day As Needed for Dizziness. 30 tablet 3   • omeprazole (priLOSEC) 20 MG capsule Take 1 capsule by mouth once daily 90 capsule 3   • torsemide (DEMADEX) 5 MG tablet 10 mg. As  needed     • vitamin B-12 (CYANOCOBALAMIN) 500 MCG tablet Take 500 mcg by mouth Daily.       No facility-administered medications prior to visit.       No opioid medication identified on active medication list. I have reviewed chart for other potential  high risk medication/s and harmful drug interactions in the elderly.          Aspirin is not on active medication list.  Aspirin use is not indicated based on review of current medical condition/s. Risk of harm outweighs potential benefits.  .    Patient Active Problem List   Diagnosis   • Benign essential HTN   • Traumatic closed nondisplaced fracture of neck of right femur (HCC)   • Mixed hyperlipidemia     Advance Care Planning  Advance Directive is on file.  ACP discussion was held with the patient during this visit. Patient has an advance directive in EMR which is still valid.     Review of Systems   Constitutional: Positive for fatigue. Negative for activity change, appetite change and fever.   HENT: Negative for congestion, ear discharge, ear pain and trouble swallowing.    Eyes: Negative for photophobia and visual disturbance.   Respiratory: Negative for cough and shortness of breath.    Cardiovascular: Negative for chest pain and palpitations.   Gastrointestinal: Negative for abdominal distention, constipation, diarrhea, nausea and vomiting.   Genitourinary: Negative for dysuria, hematuria and urgency.   Musculoskeletal: Positive for arthralgias. Negative for back pain, joint swelling and myalgias.   Skin: Negative for color change and rash.   Neurological: Negative for dizziness, weakness, light-headedness and confusion.   Hematological: Negative for adenopathy. Does not bruise/bleed easily.   Psychiatric/Behavioral: Negative for agitation and dysphoric mood. The patient is not nervous/anxious.         Objective    Vitals:    10/15/21 1050   BP: 118/60   Pulse: 60   Temp: 97.8 °F (36.6 °C)   TempSrc: Infrared   SpO2: 97%   Weight: 64.3 kg (141 lb 12.8 oz)  "  Height: 156.2 cm (61.5\")   PainSc: 0-No pain     BMI Readings from Last 1 Encounters:   10/15/21 26.36 kg/m²   BMI is above normal parameters. Recommendations include: nutrition counseling    Does the patient have evidence of cognitive impairment? Yes    Physical Exam  Constitutional:       General: She is not in acute distress.     Appearance: She is well-developed.   HENT:      Nose: Nose normal.   Eyes:      General: No scleral icterus.     Conjunctiva/sclera: Conjunctivae normal.   Neck:      Thyroid: No thyromegaly.      Trachea: No tracheal deviation.   Cardiovascular:      Rate and Rhythm: Normal rate and regular rhythm.      Heart sounds: No murmur heard.  No friction rub.   Pulmonary:      Effort: No respiratory distress.      Breath sounds: No wheezing or rales.   Abdominal:      General: There is no distension.      Palpations: Abdomen is soft. There is no mass.      Tenderness: There is no abdominal tenderness. There is no guarding.   Musculoskeletal:         General: Deformity present. Normal range of motion.   Lymphadenopathy:      Cervical: No cervical adenopathy.   Skin:     General: Skin is warm and dry.      Findings: No erythema or rash.   Neurological:      Mental Status: She is alert and oriented to person, place, and time.      Cranial Nerves: No cranial nerve deficit.      Coordination: Coordination normal.      Deep Tendon Reflexes: Reflexes are normal and symmetric.   Psychiatric:         Behavior: Behavior normal.         Thought Content: Thought content normal.         Judgment: Judgment normal.       Lab Results   Component Value Date     (H) 08/26/2021            HEALTH RISK ASSESSMENT    Smoking Status:  Social History     Tobacco Use   Smoking Status Never Smoker   Smokeless Tobacco Never Used     Alcohol Consumption:  Social History     Substance and Sexual Activity   Alcohol Use No     Fall Risk Screen:    STEADI Fall Risk Assessment was completed, and patient is at LOW risk " for falls.Assessment completed on:10/15/2021    Depression Screening:  PHQ-2/PHQ-9 Depression Screening 10/15/2021   Little interest or pleasure in doing things 0   Feeling down, depressed, or hopeless 0   Trouble falling or staying asleep, or sleeping too much -   Feeling tired or having little energy -   Poor appetite or overeating -   Feeling bad about yourself - or that you are a failure or have let yourself or your family down -   Trouble concentrating on things, such as reading the newspaper or watching television -   Moving or speaking so slowly that other people could have noticed. Or the opposite - being so fidgety or restless that you have been moving around a lot more than usual -   Thoughts that you would be better off dead, or of hurting yourself in some way -   Total Score 0       Health Habits and Functional and Cognitive Screening:  Functional & Cognitive Status 10/15/2021   Do you have difficulty preparing food and eating? No   Do you have difficulty bathing yourself, getting dressed or grooming yourself? No   Do you have difficulty using the toilet? No   Do you have difficulty moving around from place to place? No   Do you have trouble with steps or getting out of a bed or a chair? No   Current Diet Well Balanced Diet   Dental Exam Up to date        Dental Exam Comment -   Eye Exam Up to date        Eye Exam Comment -   Exercise (times per week) 7 times per week   Current Exercises Include (No Data)        Exercise Comment PT type    Current Exercise Activities Include -   Do you need help using the phone?  No   Are you deaf or do you have serious difficulty hearing?  No   Do you need help with transportation? No   Do you need help shopping? No   Do you need help preparing meals?  No   Do you need help with housework?  No   Do you need help with laundry? No   Do you need help taking your medications? No   Do you need help managing money? No   Do you ever drive or ride in a car without wearing a  seat belt? No   Have you felt unusual stress, anger or loneliness in the last month? No   Who do you live with? Alone   If you need help, do you have trouble finding someone available to you? No   Have you been bothered in the last four weeks by sexual problems? No   Do you have difficulty concentrating, remembering or making decisions? Yes       Age-appropriate Screening Schedule:  Refer to the list below for future screening recommendations based on patient's age, sex and/or medical conditions. Orders for these recommended tests are listed in the plan section. The patient has been provided with a written plan.    Health Maintenance   Topic Date Due   • ZOSTER VACCINE (1 of 2) Never done   • DXA SCAN  03/21/2019   • LIPID PANEL  09/25/2021   • TDAP/TD VACCINES (2 - Td or Tdap) 12/17/2023   • INFLUENZA VACCINE  Completed              Assessment/Plan   CMS Preventative Services Quick Reference  Risk Factors Identified During Encounter  Chronic Pain   Dementia/Memory   Polypharmacy  Urinary Incontinence  The above risks/problems have been discussed with the patient.  Follow up actions/plans if indicated are seen below in the Assessment/Plan Section.  Pertinent information has been shared with the patient in the After Visit Summary.    Diagnoses and all orders for this visit:    1. Need for vaccination (Primary)  -     COVID-19 Vaccine (Pfizer)    2. Onychomycosis longstanding.  Does not cause any pain.  She is worried about the discoloration though.  Will send it off for evaluation as nail clipping    3. Benign essential HTN stable with current meds and low-salt diet    4. B12 deficiency on supplements follow levels    Other orders  -     omeprazole (priLOSEC) 20 MG capsule; Take 1 capsule by mouth Daily.  Dispense: 90 capsule; Refill: 3  -     lisinopril (PRINIVIL,ZESTRIL) 40 MG tablet; Take 1 tablet by mouth Daily.  Dispense: 90 tablet; Refill: 3  -     torsemide (DEMADEX) 5 MG tablet; Take 1 tablet by mouth Daily  As Needed (swelling). As needed  Dispense: 20 tablet; Refill: 0        Follow Up:   No follow-ups on file.     An After Visit Summary and PPPS were made available to the patient.

## 2021-10-16 LAB
ALBUMIN SERPL-MCNC: 4.7 G/DL (ref 3.5–5.2)
ALBUMIN/GLOB SERPL: 2 G/DL
ALP SERPL-CCNC: 100 U/L (ref 39–117)
ALT SERPL-CCNC: 16 U/L (ref 1–33)
AST SERPL-CCNC: 12 U/L (ref 1–32)
BASOPHILS # BLD AUTO: 0.05 10*3/MM3 (ref 0–0.2)
BASOPHILS NFR BLD AUTO: 0.8 % (ref 0–1.5)
BILIRUB SERPL-MCNC: 0.6 MG/DL (ref 0–1.2)
BUN SERPL-MCNC: 17 MG/DL (ref 8–23)
BUN/CREAT SERPL: 27.4 (ref 7–25)
CALCIUM SERPL-MCNC: 9.8 MG/DL (ref 8.6–10.5)
CHLORIDE SERPL-SCNC: 103 MMOL/L (ref 98–107)
CO2 SERPL-SCNC: 27 MMOL/L (ref 22–29)
CREAT SERPL-MCNC: 0.62 MG/DL (ref 0.57–1)
EOSINOPHIL # BLD AUTO: 0.06 10*3/MM3 (ref 0–0.4)
EOSINOPHIL NFR BLD AUTO: 0.9 % (ref 0.3–6.2)
ERYTHROCYTE [DISTWIDTH] IN BLOOD BY AUTOMATED COUNT: 12.1 % (ref 12.3–15.4)
GLOBULIN SER CALC-MCNC: 2.4 GM/DL
GLUCOSE SERPL-MCNC: 95 MG/DL (ref 65–99)
HCT VFR BLD AUTO: 37.1 % (ref 34–46.6)
HGB BLD-MCNC: 12.5 G/DL (ref 12–15.9)
IMM GRANULOCYTES # BLD AUTO: 0.02 10*3/MM3 (ref 0–0.05)
IMM GRANULOCYTES NFR BLD AUTO: 0.3 % (ref 0–0.5)
LYMPHOCYTES # BLD AUTO: 2.43 10*3/MM3 (ref 0.7–3.1)
LYMPHOCYTES NFR BLD AUTO: 37.2 % (ref 19.6–45.3)
MCH RBC QN AUTO: 29.6 PG (ref 26.6–33)
MCHC RBC AUTO-ENTMCNC: 33.7 G/DL (ref 31.5–35.7)
MCV RBC AUTO: 87.7 FL (ref 79–97)
MONOCYTES # BLD AUTO: 0.63 10*3/MM3 (ref 0.1–0.9)
MONOCYTES NFR BLD AUTO: 9.6 % (ref 5–12)
NEUTROPHILS # BLD AUTO: 3.34 10*3/MM3 (ref 1.7–7)
NEUTROPHILS NFR BLD AUTO: 51.2 % (ref 42.7–76)
NRBC BLD AUTO-RTO: 0 /100 WBC (ref 0–0.2)
PLATELET # BLD AUTO: 207 10*3/MM3 (ref 140–450)
POTASSIUM SERPL-SCNC: 4.6 MMOL/L (ref 3.5–5.2)
PROT SERPL-MCNC: 7.1 G/DL (ref 6–8.5)
RBC # BLD AUTO: 4.23 10*6/MM3 (ref 3.77–5.28)
SODIUM SERPL-SCNC: 141 MMOL/L (ref 136–145)
VIT B12 SERPL-MCNC: >2000 PG/ML (ref 211–946)
WBC # BLD AUTO: 6.53 10*3/MM3 (ref 3.4–10.8)

## 2021-11-02 ENCOUNTER — OFFICE VISIT (OUTPATIENT)
Dept: ORTHOPEDIC SURGERY | Facility: CLINIC | Age: 86
End: 2021-11-02

## 2021-11-02 VITALS — BODY MASS INDEX: 27.6 KG/M2 | HEIGHT: 61 IN | WEIGHT: 146.2 LBS | RESPIRATION RATE: 18 BRPM

## 2021-11-02 DIAGNOSIS — S72.001D: Primary | ICD-10-CM

## 2021-11-02 DIAGNOSIS — M16.11 PRIMARY OSTEOARTHRITIS OF RIGHT HIP: ICD-10-CM

## 2021-11-02 PROCEDURE — 99024 POSTOP FOLLOW-UP VISIT: CPT | Performed by: PHYSICIAN ASSISTANT

## 2021-11-02 NOTE — PROGRESS NOTES
Subjective   Patient ID: Sal Siu is a 86 y.o. right hand dominant female  Follow-up of the Right Hip (Cannulated Screws Placement 8/3/21. States she is doing really well, has some occasional pain.)         History of Present Illness  Patient is following up for a routine post op visit. Patient had hip cannulated screws on 8-3-21. Overall, she reports she is doing well. At times, she does have occasional pain to right groin with certain positions. The pain is short acting.     Past Medical History:   Diagnosis Date   • Arthritis    • Body piercing     EARS ONLY   • Cataract, right eye    • Chronic hypertension    • Constipation    • Dyslipidemia    • GERD (gastroesophageal reflux disease)    • Glaucoma    • Hx of migraines    • Hyperlipidemia    • Hypertensive cardiovascular disease    • Impaired functional mobility, balance, gait, and endurance 08/04/2021   • Incontinence in female    • Intermittent palpitations    • Obstructive sleep apnea     DOES NOT USE BPAP/ CPAP   • PONV (postoperative nausea and vomiting)    • UTI (urinary tract infection)    • Wears glasses    • Wears partial dentures     LOWER         Past Surgical History:   Procedure Laterality Date   • APPENDECTOMY  1946   • COLONOSCOPY     • DILATATION AND CURETTAGE  1970   • ENDOSCOPY     • EYE CAPSULOTOMY WITH LASER Left 5/22/2018    Procedure: EYE CAPSULOTOMY WITH LASER LEFT;  Surgeon: Meseret Simmons MD;  Location: [facility] OR;  Service: Ophthalmology   • EYE SURGERY Bilateral     CATARACTS REMOVED   • HIP CANNULATED SCREW PLACEMENT Right 8/3/2021    Procedure: HIP CANNULATED SCREWS PLACEMENT, RIGHT;  Surgeon: Pelon Wooten MD;  Location: [facility];  Service: Orthopedics;  Laterality: Right;   • HYSTERECTOMY  1971    COMPLETE   • LAPAROSCOPIC CHOLECYSTECTOMY     • LIPOMA EXCISION      Lower back   • LUMBAR DISC SURGERY  1997   • OOPHORECTOMY         Family History   Problem Relation Age of Onset   • Heart attack Mother    •  Hypertension Mother    • Heart attack Father    • Cancer Father        Social History     Socioeconomic History   • Marital status:    Tobacco Use   • Smoking status: Never Smoker   • Smokeless tobacco: Never Used   Vaping Use   • Vaping Use: Never used   Substance and Sexual Activity   • Alcohol use: No   • Drug use: No   • Sexual activity: Defer         Current Outpatient Medications:   •  amLODIPine (NORVASC) 2.5 MG tablet, Take 1 tablet by mouth Daily., Disp: 30 tablet, Rfl: 5  •  atorvastatin (LIPITOR) 40 MG tablet, Take 1 tablet by mouth once daily, Disp: 90 tablet, Rfl: 0  •  Biotin 44128 MCG tablet, Take  by mouth As Needed., Disp: , Rfl:   •  Cholecalciferol (VITAMIN D3) 5000 units capsule capsule, Take 1 capsule by mouth Daily., Disp: 30 capsule, Rfl: 6  •  ciclopirox (LOPROX) 0.77 % cream, Apply  topically to the appropriate area as directed Every Night. Clean nail with alcohol for 7 days prior to application, Disp: 90 g, Rfl: 2  •  lisinopril (PRINIVIL,ZESTRIL) 40 MG tablet, Take 1 tablet by mouth Daily., Disp: 90 tablet, Rfl: 3  •  meclizine (ANTIVERT) 12.5 MG tablet, Take 1 tablet by mouth 3 (Three) Times a Day As Needed for Dizziness., Disp: 30 tablet, Rfl: 3  •  omeprazole (priLOSEC) 20 MG capsule, Take 1 capsule by mouth Daily., Disp: 90 capsule, Rfl: 3  •  torsemide (DEMADEX) 5 MG tablet, Take 1 tablet by mouth Daily As Needed (swelling). As needed, Disp: 20 tablet, Rfl: 0  •  vitamin B-12 (CYANOCOBALAMIN) 500 MCG tablet, Take 500 mcg by mouth Daily., Disp: , Rfl:     Allergies   Allergen Reactions   • Sulfa Antibiotics Swelling     urticaria   • Other Nausea And Vomiting     MUSHROOMS-CAUSE 'HALLUCINATIONS'       Review of Systems   Constitutional: Negative for fever.   HENT: Negative for dental problem and voice change.    Eyes: Negative for visual disturbance.   Respiratory: Negative for shortness of breath.    Cardiovascular: Negative for chest pain.   Gastrointestinal: Negative for  "abdominal pain.   Genitourinary: Negative for dysuria.   Musculoskeletal: Positive for arthralgias (right hip). Negative for gait problem and joint swelling.   Skin: Negative for rash.   Neurological: Negative for speech difficulty.   Hematological: Does not bruise/bleed easily.   Psychiatric/Behavioral: Negative for confusion.       I have reviewed the medical and surgical history, family history, social history, medications, and/or allergies, and the review of systems of this report.    Objective   Resp 18   Ht 156.2 cm (61.5\")   Wt 66.3 kg (146 lb 3.2 oz)   LMP  (LMP Unknown)   BMI 27.18 kg/m²    Physical Exam  Vitals and nursing note reviewed.   Constitutional:       Appearance: Normal appearance.   Pulmonary:      Effort: Pulmonary effort is normal.   Musculoskeletal:      Right hip: No deformity, bony tenderness or crepitus. Normal strength.   Neurological:      Mental Status: She is alert and oriented to person, place, and time.   Psychiatric:         Behavior: Behavior normal.       Right Hip Exam     Tenderness   The patient is experiencing tenderness in the anterior.    Range of Motion   Abduction: 30   Flexion: 90   External rotation: 40     Other   Sensation: normal           Extremity DVT signs are negative on physical exam with negative Sandie sign, no calf pain, no palpable cords and no skin tone change   Neurologic Exam     Mental Status   Oriented to person, place, and time.            Assessment/Plan   Independent Review of Radiographic Studies:    Xray of right hip 2 views in office for eval of femoral neck fracture healing. Comparison films are available to review. No interval displacement. There is moderate right hip joint space narrowing with spurring      Procedures       Diagnoses and all orders for this visit:    1. Closed traumatic nondisplaced fracture of neck of right femur with routine healing, subsequent encounter (Primary)  -     XR Hip With or Without Pelvis 2 - 3 View Right; " "Future    2. Primary osteoarthritis of right hip       Orthopedic activities reviewed and patient expressed appreciation  Discussion of orthopedic goals  Risk, benefits, and merits of treatment alternatives reviewed with the patient and questions answered    Recommendations/Plan:  Exercise, medications, injections, other patient advice, and return appointment as noted.  Patient is encouraged to call or return for any issues or concerns.    Patient politely declined FL hip injection- she states her pain \"isnt that bad at this time\"    Follow up in 3 months    Patient agreeable to call or return sooner for any concerns.               EMR Dragon-transcription disclaimer:  This encounter note is an electronic transcription of spoken language to printed text.  Electronic transcription of spoken language may permit erroneous or at times nonsensical words or phrases to be inadvertently transcribed.  Although I have reviewed the note for such errors, some may still exist  "

## 2021-11-17 LAB
FUNGUS SPEC CULT: NORMAL
FUNGUS SPEC CULT: NORMAL

## 2022-02-09 ENCOUNTER — OFFICE VISIT (OUTPATIENT)
Dept: ORTHOPEDIC SURGERY | Facility: CLINIC | Age: 87
End: 2022-02-09

## 2022-02-09 VITALS — TEMPERATURE: 98 F | WEIGHT: 145.8 LBS | HEIGHT: 62 IN | BODY MASS INDEX: 26.83 KG/M2

## 2022-02-09 DIAGNOSIS — S72.001D: Primary | ICD-10-CM

## 2022-02-09 DIAGNOSIS — M16.11 PRIMARY OSTEOARTHRITIS OF RIGHT HIP: ICD-10-CM

## 2022-02-09 PROCEDURE — 99212 OFFICE O/P EST SF 10 MIN: CPT | Performed by: PHYSICIAN ASSISTANT

## 2022-02-09 NOTE — PROGRESS NOTES
Subjective   Patient ID: Sal Siu is a 86 y.o. right hand dominant female  Follow-up of the Right Hip (Cannulated Screws Placement 8/3/2021. States it is doing fine, only has pain if she has been laying on it.)         History of Present Illness  Following up for right hip cannulated screw placement.   Date of surgery 8-3-21  Patient reports she is doing good.     Past Medical History:   Diagnosis Date   • Arthritis    • Body piercing     EARS ONLY   • Cataract, right eye    • Chronic hypertension    • Constipation    • Dyslipidemia    • GERD (gastroesophageal reflux disease)    • Glaucoma    • Hx of migraines    • Hyperlipidemia    • Hypertensive cardiovascular disease    • Impaired functional mobility, balance, gait, and endurance 08/04/2021   • Incontinence in female    • Intermittent palpitations    • Obstructive sleep apnea     DOES NOT USE BPAP/ CPAP   • PONV (postoperative nausea and vomiting)    • UTI (urinary tract infection)    • Wears glasses    • Wears partial dentures     LOWER         Past Surgical History:   Procedure Laterality Date   • APPENDECTOMY  1946   • COLONOSCOPY     • DILATATION AND CURETTAGE  1970   • ENDOSCOPY     • EYE CAPSULOTOMY WITH LASER Left 5/22/2018    Procedure: EYE CAPSULOTOMY WITH LASER LEFT;  Surgeon: Meseret Simmons MD;  Location: [facility];  Service: Ophthalmology   • EYE SURGERY Bilateral     CATARACTS REMOVED   • HIP CANNULATED SCREW PLACEMENT Right 8/3/2021    Procedure: HIP CANNULATED SCREWS PLACEMENT, RIGHT;  Surgeon: Pelon Wooten MD;  Location: [facility];  Service: Orthopedics;  Laterality: Right;   • HYSTERECTOMY  1971    COMPLETE   • LAPAROSCOPIC CHOLECYSTECTOMY     • LIPOMA EXCISION      Lower back   • LUMBAR DISC SURGERY  1997   • OOPHORECTOMY         Family History   Problem Relation Age of Onset   • Heart attack Mother    • Hypertension Mother    • Heart attack Father    • Cancer Father        Social History     Socioeconomic History   •  Marital status:    Tobacco Use   • Smoking status: Never Smoker   • Smokeless tobacco: Never Used   Vaping Use   • Vaping Use: Never used   Substance and Sexual Activity   • Alcohol use: No   • Drug use: No   • Sexual activity: Defer         Current Outpatient Medications:   •  amLODIPine (NORVASC) 2.5 MG tablet, Take 1 tablet by mouth Daily., Disp: 30 tablet, Rfl: 5  •  atorvastatin (LIPITOR) 40 MG tablet, Take 1 tablet by mouth once daily, Disp: 90 tablet, Rfl: 0  •  Biotin 08833 MCG tablet, Take  by mouth As Needed., Disp: , Rfl:   •  Cholecalciferol (VITAMIN D3) 5000 units capsule capsule, Take 1 capsule by mouth Daily., Disp: 30 capsule, Rfl: 6  •  ciclopirox (LOPROX) 0.77 % cream, Apply  topically to the appropriate area as directed Every Night. Clean nail with alcohol for 7 days prior to application, Disp: 90 g, Rfl: 2  •  lisinopril (PRINIVIL,ZESTRIL) 40 MG tablet, Take 1 tablet by mouth Daily., Disp: 90 tablet, Rfl: 3  •  meclizine (ANTIVERT) 12.5 MG tablet, Take 1 tablet by mouth 3 (Three) Times a Day As Needed for Dizziness., Disp: 30 tablet, Rfl: 3  •  omeprazole (priLOSEC) 20 MG capsule, Take 1 capsule by mouth Daily., Disp: 90 capsule, Rfl: 3  •  torsemide (DEMADEX) 5 MG tablet, Take 1 tablet by mouth Daily As Needed (swelling). As needed, Disp: 20 tablet, Rfl: 0  •  vitamin B-12 (CYANOCOBALAMIN) 500 MCG tablet, Take 500 mcg by mouth Daily., Disp: , Rfl:     Allergies   Allergen Reactions   • Sulfa Antibiotics Swelling     urticaria   • Other Nausea And Vomiting     MUSHROOMS-CAUSE 'HALLUCINATIONS'       Review of Systems   Constitutional: Negative for fever.   HENT: Negative for dental problem and voice change.    Eyes: Negative for visual disturbance.   Respiratory: Negative for shortness of breath.    Cardiovascular: Negative for chest pain.   Gastrointestinal: Negative for abdominal pain.   Genitourinary: Negative for dysuria.   Musculoskeletal: Negative for arthralgias, gait problem and  "joint swelling.   Skin: Negative for rash.   Neurological: Negative for speech difficulty.   Hematological: Does not bruise/bleed easily.   Psychiatric/Behavioral: Negative for confusion.       I have reviewed the medical and surgical history, family history, social history, medications, and/or allergies, and the review of systems of this report.    Objective   Temp 98 °F (36.7 °C)   Ht 156.2 cm (61.5\")   Wt 66.1 kg (145 lb 12.8 oz)   LMP  (LMP Unknown)   BMI 27.10 kg/m²    Physical Exam  Vitals and nursing note reviewed.   Constitutional:       Appearance: Normal appearance.   Pulmonary:      Effort: Pulmonary effort is normal.   Musculoskeletal:      Right hip: No deformity, tenderness or crepitus. Normal range of motion. Normal strength.      Right upper leg: Normal.   Neurological:      Mental Status: She is alert and oriented to person, place, and time.       Ortho Exam   Extremity DVT signs are negative by clinical screen.   Neurologic Exam     Mental Status   Oriented to person, place, and time.            Assessment/Plan   Independent Review of Radiographic Studies:    AP and lateral of the right hip, indication to evaluate fracture healing, and compared with prior imaging, shows interim fracture healing, callus and or periostitis with good maintained reduction and alignment and intact position of fracture fixation hardware.      Procedures       Diagnoses and all orders for this visit:    1. Closed traumatic nondisplaced fracture of neck of right femur with routine healing, subsequent encounter (Primary)  -     XR Hip With or Without Pelvis 2 - 3 View Right; Future       Discussion of orthopedic goals  Risk, benefits, and merits of treatment alternatives reviewed with the patient and questions answered  Ice, heat, and/or modalities as beneficial    Recommendations/Plan:  Exercise, medications, injections, other patient advice, and return appointment as noted.  Patient is encouraged to call or return for " any issues or concerns.    Patient agreeable to call or return sooner for any concerns.    Continue additional PT for strengthening  Follow up as needed               EMR Dragon-transcription disclaimer:  This encounter note is an electronic transcription of spoken language to printed text.  Electronic transcription of spoken language may permit erroneous or at times nonsensical words or phrases to be inadvertently transcribed.  Although I have reviewed the note for such errors, some may still exist

## 2022-02-18 ENCOUNTER — TELEPHONE (OUTPATIENT)
Dept: INTERNAL MEDICINE | Facility: CLINIC | Age: 87
End: 2022-02-18

## 2022-02-18 ENCOUNTER — OFFICE VISIT (OUTPATIENT)
Dept: INTERNAL MEDICINE | Facility: CLINIC | Age: 87
End: 2022-02-18

## 2022-02-18 VITALS
DIASTOLIC BLOOD PRESSURE: 78 MMHG | BODY MASS INDEX: 26.68 KG/M2 | TEMPERATURE: 96.8 F | OXYGEN SATURATION: 98 % | WEIGHT: 145 LBS | RESPIRATION RATE: 24 BRPM | SYSTOLIC BLOOD PRESSURE: 172 MMHG | HEART RATE: 64 BPM | HEIGHT: 62 IN

## 2022-02-18 DIAGNOSIS — I10 ESSENTIAL HYPERTENSION: ICD-10-CM

## 2022-02-18 DIAGNOSIS — G44.209 ACUTE NON INTRACTABLE TENSION-TYPE HEADACHE: ICD-10-CM

## 2022-02-18 DIAGNOSIS — I10 ESSENTIAL HYPERTENSION: Primary | ICD-10-CM

## 2022-02-18 PROCEDURE — 99214 OFFICE O/P EST MOD 30 MIN: CPT | Performed by: NURSE PRACTITIONER

## 2022-02-18 RX ORDER — AMLODIPINE BESYLATE 2.5 MG/1
TABLET ORAL
Qty: 90 TABLET | Refills: 3 | Status: SHIPPED | OUTPATIENT
Start: 2022-02-18 | End: 2022-02-18 | Stop reason: DRUGHIGH

## 2022-02-18 RX ORDER — AMLODIPINE BESYLATE 2.5 MG/1
2.5 TABLET ORAL 2 TIMES DAILY
Qty: 180 TABLET | Refills: 3 | Status: SHIPPED | OUTPATIENT
Start: 2022-02-18

## 2022-02-18 NOTE — TELEPHONE ENCOUNTER
Rx Refill Note  Requested Prescriptions     Pending Prescriptions Disp Refills   • amLODIPine (NORVASC) 2.5 MG tablet [Pharmacy Med Name: amLODIPine Besylate 2.5 MG Oral Tablet] 90 tablet 0     Sig: Take 1 tablet by mouth once daily      Last office visit with prescribing clinician: 10/15/2021      Next office visit with prescribing clinician: 2/18/2022            MYRIAM JOSEPH MA  02/18/22, 14:12 EST

## 2022-02-18 NOTE — TELEPHONE ENCOUNTER
Patient seen Nell today. She has been advised to increase amlodipine. She is going to start taking it twice a day. Pharmacy informed of change

## 2022-02-18 NOTE — PROGRESS NOTES
"  Office Visit      Patient Name: Sal Siu  : 1935   MRN: [id number]   Care Team: Patient Care Team:  David Barrios MD as PCP - General (Internal Medicine)  Arron Farias MD as Consulting Physician (Cardiology)  Olivier Andrade PA-C as Physician Assistant (Physician Assistant)    Chief Complaint  Sinusitis (headache, pressure around the eyes, x2-3 days, \"sick headache\")    Subjective     Subjective      Sal Siu presents to [facility] for headache.   Symptoms started about 1 week ago but worsened in the last 2 to 3 days.   She describes the headache behind her eyes and radiates to the back of her head, it is bilateral. She has a history of migraines when she was younger and feels somewhat similar.  Denies rhinorrhea, congestion, sore throat, chest pain, worsening shortness of breath, lower extremity swelling, vision changes, and dizziness. She admits she has had some confusion over the last year that is intermittent but nothing worse lately since the symptoms began.   Blood pressure is elevated in the office today, she has not been monitoring it at home. She took her antihypertensives last night which include amlodipine and lisinopril. Amlodipine was decreased 1 year ago due to edema, she has continued to have leg swelling at times despite the decrease.  Reports no recent lifestyle changes such as increase in stress, increase in salt, or ETOH use.     Review of Systems   Constitutional: Negative for chills, fatigue and fever.   HENT: Negative for congestion, postnasal drip, sinus pressure, sneezing, sore throat, swollen glands and trouble swallowing.    Respiratory: Negative for cough, shortness of breath and wheezing.    Cardiovascular: Negative for chest pain.   Gastrointestinal: Negative for abdominal pain, diarrhea, nausea and vomiting.   Musculoskeletal: Positive for arthralgias.   Neurological: Positive for headache. Negative for dizziness, speech " "difficulty and weakness.   Psychiatric/Behavioral: Negative for sleep disturbance.       Objective     Objective   Vital Signs:   /78   Pulse 64   Temp 96.8 °F (36 °C)   Resp 24   Ht 156.2 cm (61.5\")   Wt 65.8 kg (145 lb)   SpO2 98%   BMI 26.95 kg/m²     Physical Exam  Vitals and nursing note reviewed.   Constitutional:       General: She is not in acute distress.     Appearance: Normal appearance. She is not ill-appearing or toxic-appearing.   HENT:      Right Ear: Tympanic membrane and ear canal normal.      Left Ear: Tympanic membrane and ear canal normal.      Nose: Nose normal.      Mouth/Throat:      Mouth: Mucous membranes are moist.      Pharynx: No posterior oropharyngeal erythema.   Eyes:      Extraocular Movements: Extraocular movements intact.      Right eye: Normal extraocular motion and no nystagmus.      Left eye: Normal extraocular motion and no nystagmus.      Pupils: Pupils are equal, round, and reactive to light.   Neck:      Vascular: No carotid bruit.   Cardiovascular:      Rate and Rhythm: Normal rate and regular rhythm.      Heart sounds: Normal heart sounds. No murmur heard.      Pulmonary:      Effort: Pulmonary effort is normal. No respiratory distress.      Breath sounds: Normal breath sounds. No wheezing.   Musculoskeletal:      Cervical back: Neck supple. No tenderness.   Skin:     General: Skin is warm and dry.      Findings: No rash.   Neurological:      General: No focal deficit present.      Mental Status: She is alert and oriented to person, place, and time.      GCS: GCS eye subscore is 4. GCS verbal subscore is 5. GCS motor subscore is 6.      Motor: No weakness.      Coordination: Coordination normal.      Gait: Gait normal.   Psychiatric:         Mood and Affect: Mood normal.         Behavior: Behavior normal.          Assessment / Plan      Assessment/Plan   Problem List Items Addressed This Visit     None      Visit Diagnoses     Essential hypertension    -  " Primary    Relevant Orders    CBC No Differential    Comprehensive metabolic panel    TSH Rfx On Abnormal To Free T4    Elevated in office today, reviewed readings and much more elevated than previous. Suspect this is likely the cause of recent headaches. Recommend increasing amlodipine to 5mg daily, she will take 2 tablets for now to see if she tolerates and monitor blood pressure at home. She will bring readings to her next visit. Recommend DASH diet and medication compliance. Labs as above to be updated.     Acute non intractable tension-type headache        Relevant Orders    CBC No Differential    Comprehensive metabolic panel    TSH Rfx On Abnormal To Free T4    Reassuring neurologic exam today with no acute deficits. Recommend increasing amlodipine dose, tylenol as needed, and rest. Continue lisinopril at current dose. ER with any acute neurological deficit. Follow-up here in 2 weeks for blood pressure recheck.            Follow Up   Return in about 2 weeks (around 3/4/2022) for Recheck.  Patient was given instructions and counseling regarding her condition or for health maintenance advice. Please see specific information pulled into the AVS if appropriate.     SUDHIR Christina  [facility]

## 2022-02-18 NOTE — TELEPHONE ENCOUNTER
Caller: Sal Siu    Relationship: Self    Best call back number    [phone]    Requested Prescriptions:      amLODIPine (NORVASC) 2.5 MG tablet    IT WAS NOTED THAT THERE IS A PENDED REORDER ON THIS MEDICATION    PATIENT STATED THE DOSAGE IS SUPPOSED TO DOUBLE TO BE TAKEN (TWO TABLETS DAILY INSTEAD OF ONE TABLET DAILY)    Pharmacy where request should be sent:      [place], [place]    TELEPHONE CONTACT:    [phone]    Additional details provided by patient:     PATIENT STATED SHE IS COMPLETELY OUT OF THE MEDICATION    PATIENT ALSO STATED SINCE SHE IS OUT OF THE MEDICATION HER BLOOD PRESSURE IS PEAKING AND SHE HAS HEADACHES    Does the patient have less than a 3 day supply:  [x] Yes  [] No    Orquidea Jo Rep   02/18/22 13:18 EST     MADAY DIOGO

## 2022-03-03 ENCOUNTER — TELEPHONE (OUTPATIENT)
Dept: INTERNAL MEDICINE | Facility: CLINIC | Age: 87
End: 2022-03-03

## 2022-03-03 ENCOUNTER — OFFICE VISIT (OUTPATIENT)
Dept: INTERNAL MEDICINE | Facility: CLINIC | Age: 87
End: 2022-03-03

## 2022-03-03 VITALS
WEIGHT: 145.8 LBS | DIASTOLIC BLOOD PRESSURE: 80 MMHG | SYSTOLIC BLOOD PRESSURE: 132 MMHG | TEMPERATURE: 97.1 F | OXYGEN SATURATION: 97 % | HEIGHT: 62 IN | BODY MASS INDEX: 26.83 KG/M2 | HEART RATE: 74 BPM

## 2022-03-03 DIAGNOSIS — Z13.820 OSTEOPOROSIS SCREENING: ICD-10-CM

## 2022-03-03 DIAGNOSIS — Z78.0 POSTMENOPAUSAL: ICD-10-CM

## 2022-03-03 DIAGNOSIS — R68.89 FORGETFULNESS: ICD-10-CM

## 2022-03-03 DIAGNOSIS — S72.001D: ICD-10-CM

## 2022-03-03 DIAGNOSIS — I10 BENIGN ESSENTIAL HTN: Primary | ICD-10-CM

## 2022-03-03 PROCEDURE — 99214 OFFICE O/P EST MOD 30 MIN: CPT | Performed by: NURSE PRACTITIONER

## 2022-03-03 NOTE — PROGRESS NOTES
Office Visit      Patient Name: Sal Siu  : 1935   MRN: [id number]   Care Team: Patient Care Team:  David Barrios MD as PCP - General (Internal Medicine)  Arron Farias MD as Consulting Physician (Cardiology)  Olivier Andrade PA-C as Physician Assistant (Physician Assistant)    Chief Complaint  Hypertension (2 week f/u, increase Amlodipine to 5 mg daily at last office visit )    Subjective     Subjective      Sal Siu presents to [facility] for hypertension follow-up, memory concerns, and DEXA scan.   Seen 2 weeks ago in the office due to severe headache, blood pressure markedly elevated at that time. Her amlodipine was increased to 5 mg and she is tolerating the change well. Blood pressure shows good control today. She is not monitoring it consistently at home. Denies chest pain, shortness of breath, lower extremity swelling, orthopnea, and dizziness. Still endorses headaches from time to time which is not unusual for her and relieved by tylenol.     Fell on 2021. This ended in a hip fracture and has been following with ortho. Ortho has suggested a DEXA scan and daughter would like for her to get one scheduled. She has never been diagnosed with osteopneia or osteoperosis. Last DEXA was in 2019 and results are not available. She has not had any other fractures and hip fracture was sustained after a major fall in her basement. She does not think she hit her head during the fall. Daughter is here today helping with the history and has concerns with her memory. She was forgetful prior to falling but it is progressively worsening since her fall. She has trouble remembering conversations. Does not have any trouble recalling who her family is. She is still driving and never forgets where she is going and she manages her own finances.     ATTENTION  What is the year: correct  What is the month of the year: correct  What is the day of the week?:  "correct  What is the date?: correct  MEMORY  Repeat address three times, only score third attempt: [address] [place], [place]: 7  HOW MANY ANIMALS DID THE PATIENT NAME  Verbal Fluency -- Animal Names (0-25): 7-8  CLOCK DRAWING  Clock Drawing: All Correct  MEMORY RECALL  Tell me what you remember about that name and address we were repeating at the beginnin  ACE TOTAL SCORE  Total ACE Score - <25/30 strongly suggests cognitive impairment; <21/30 almost certainly shows dementia: 22      Review of Systems   Constitutional: Negative for appetite change, fatigue and fever.   HENT: Negative for congestion, sore throat and trouble swallowing.    Eyes: Negative for blurred vision and visual disturbance.   Respiratory: Negative for cough, shortness of breath and wheezing.    Cardiovascular: Negative for chest pain, palpitations and leg swelling.   Gastrointestinal: Negative for abdominal pain, blood in stool, constipation, diarrhea and nausea.   Endocrine: Negative for polydipsia, polyphagia and polyuria.   Genitourinary: Negative for dysuria.   Musculoskeletal: Positive for arthralgias. Negative for back pain and myalgias.   Skin: Negative for rash.   Neurological: Positive for headache and memory problem (forgetulness). Negative for dizziness, weakness and light-headedness.   Psychiatric/Behavioral: Negative for sleep disturbance and depressed mood. The patient is not nervous/anxious.        Objective     Objective   Vital Signs:   /80   Pulse 74   Temp 97.1 °F (36.2 °C) (Temporal)   Ht 156.2 cm (61.5\")   Wt 66.1 kg (145 lb 12.8 oz)   SpO2 97%   BMI 27.10 kg/m²     Physical Exam  Vitals and nursing note reviewed.   Constitutional:       General: She is not in acute distress.     Appearance: Normal appearance. She is not toxic-appearing.   Eyes:      Pupils: Pupils are equal, round, and reactive to light.   Neck:      Vascular: No carotid bruit.   Cardiovascular:      Rate and Rhythm: " Normal rate and regular rhythm.      Heart sounds: Normal heart sounds. No murmur heard.      Pulmonary:      Effort: Pulmonary effort is normal. No respiratory distress.      Breath sounds: Normal breath sounds. No wheezing.   Abdominal:      General: Bowel sounds are normal. There is no distension.      Palpations: Abdomen is soft.      Tenderness: There is no abdominal tenderness.   Musculoskeletal:      Cervical back: Neck supple. No tenderness.      Comments: Using cane for ambulation     Skin:     General: Skin is warm and dry.      Findings: No rash.   Neurological:      General: No focal deficit present.      Mental Status: She is alert and oriented to person, place, and time.      GCS: GCS eye subscore is 4. GCS verbal subscore is 5. GCS motor subscore is 6.      Motor: No weakness.      Coordination: Coordination normal.   Psychiatric:         Mood and Affect: Mood normal.         Behavior: Behavior normal.       Assessment / Plan      Assessment/Plan   Problem List Items Addressed This Visit        Cardiac and Vasculature    Benign essential HTN - Primary    Stable. Continue current medications as prescribed, advised to take 5mg of amlodipine in the morning does not need to be twice daily. Recommend DASH diet, moderate-intensity exercises 4-5 times/week, medication compliance, and home blood pressure monitoring.          Musculoskeletal and Injuries    Traumatic closed nondisplaced fracture of neck of right femur (HCC)    Overview     Added automatically from request for surgery [id number]         Relevant Orders    DEXA Bone Density Axial      Other Visit Diagnoses     Forgetfulness        Relevant Orders    CT Head Without Contrast    Previous labs pending, recent b12 >2,000 do not see benefit in rechecking, ACE mini showing some cognitive decline. She does not appear to be a harm to herself at this time and actually does quite well for her age taking care of her elderly , driving, cooking, and  managing her own finances. Memory will need monitored closely. CT head ordered to rule out underlying causes. Encouraged to keep brain active with puzzles and/or reading, daily walks, and socializing outside of the home. Keep follow-up with PCP in April.     Postmenopausal        Relevant Orders    DEXA Bone Density Axial    Osteoporosis screening        Relevant Orders        DEXA for screening purposes ordered. Escalate treatment as indicated.            Follow Up   Return for Keep follow-up with Dr. Barrios.  Patient was given instructions and counseling regarding her condition or for health maintenance advice. Please see specific information pulled into the AVS if appropriate.     SUDHIR Christina  [facility] Primary [facility]

## 2022-03-03 NOTE — TELEPHONE ENCOUNTER
Called spoke to Sal to inform her that Nell had spoke with Dr. Barrios and he agrees to have the CT of the head due to her memory. She will be getting a call to schedule. Patient verbalized understanding.

## 2022-03-05 RX ORDER — ATORVASTATIN CALCIUM 40 MG/1
TABLET, FILM COATED ORAL
Qty: 90 TABLET | Refills: 0 | Status: SHIPPED | OUTPATIENT
Start: 2022-03-05 | End: 2022-07-27

## 2022-03-08 LAB
ALBUMIN SERPL-MCNC: 4.5 G/DL (ref 3.5–5.2)
ALBUMIN/GLOB SERPL: 1.7 G/DL
ALP SERPL-CCNC: 114 U/L (ref 39–117)
ALT SERPL-CCNC: 23 U/L (ref 1–33)
AST SERPL-CCNC: 13 U/L (ref 1–32)
BILIRUB SERPL-MCNC: 0.6 MG/DL (ref 0–1.2)
BUN SERPL-MCNC: 13 MG/DL (ref 8–23)
BUN/CREAT SERPL: 17.6 (ref 7–25)
CALCIUM SERPL-MCNC: 9.8 MG/DL (ref 8.6–10.5)
CHLORIDE SERPL-SCNC: 103 MMOL/L (ref 98–107)
CO2 SERPL-SCNC: 25.4 MMOL/L (ref 22–29)
CREAT SERPL-MCNC: 0.74 MG/DL (ref 0.57–1)
EGFR GENE MUT ANL BLD/T: 78.9 ML/MIN/1.73
ERYTHROCYTE [DISTWIDTH] IN BLOOD BY AUTOMATED COUNT: 12 % (ref 12.3–15.4)
GLOBULIN SER CALC-MCNC: 2.6 GM/DL
GLUCOSE SERPL-MCNC: 104 MG/DL (ref 65–99)
HCT VFR BLD AUTO: 42.4 % (ref 34–46.6)
HGB BLD-MCNC: 14 G/DL (ref 12–15.9)
MCH RBC QN AUTO: 30.1 PG (ref 26.6–33)
MCHC RBC AUTO-ENTMCNC: 33 G/DL (ref 31.5–35.7)
MCV RBC AUTO: 91.2 FL (ref 79–97)
PLATELET # BLD AUTO: 235 10*3/MM3 (ref 140–450)
POTASSIUM SERPL-SCNC: 4.2 MMOL/L (ref 3.5–5.2)
PROT SERPL-MCNC: 7.1 G/DL (ref 6–8.5)
RBC # BLD AUTO: 4.65 10*6/MM3 (ref 3.77–5.28)
SODIUM SERPL-SCNC: 140 MMOL/L (ref 136–145)
TSH SERPL DL<=0.005 MIU/L-ACNC: 4.39 UIU/ML (ref 0.27–4.2)
WBC # BLD AUTO: 6.59 10*3/MM3 (ref 3.4–10.8)

## 2022-03-11 ENCOUNTER — HOSPITAL ENCOUNTER (OUTPATIENT)
Dept: CT IMAGING | Facility: HOSPITAL | Age: 87
Discharge: HOME | End: 2022-03-11

## 2022-03-11 ENCOUNTER — APPOINTMENT (OUTPATIENT)
Dept: BONE DENSITY | Facility: HOSPITAL | Age: 87
End: 2022-03-11

## 2022-03-11 DIAGNOSIS — R68.89 FORGETFULNESS: ICD-10-CM

## 2022-03-11 PROCEDURE — 77080 DXA BONE DENSITY AXIAL: CPT

## 2022-03-11 PROCEDURE — 70450 CT HEAD/BRAIN W/O DYE: CPT

## 2022-03-18 DIAGNOSIS — M81.0 AGE-RELATED OSTEOPOROSIS WITHOUT CURRENT PATHOLOGICAL FRACTURE: Primary | ICD-10-CM

## 2022-03-18 RX ORDER — ALENDRONATE SODIUM 70 MG/1
70 TABLET ORAL
Qty: 4 TABLET | Refills: 12 | Status: SHIPPED | OUTPATIENT
Start: 2022-03-18

## 2022-04-21 ENCOUNTER — OFFICE VISIT (OUTPATIENT)
Dept: INTERNAL MEDICINE | Facility: CLINIC | Age: 87
End: 2022-04-21

## 2022-04-21 VITALS
HEIGHT: 62 IN | BODY MASS INDEX: 26.5 KG/M2 | SYSTOLIC BLOOD PRESSURE: 150 MMHG | WEIGHT: 144 LBS | OXYGEN SATURATION: 96 % | TEMPERATURE: 98.2 F | HEART RATE: 79 BPM | DIASTOLIC BLOOD PRESSURE: 70 MMHG

## 2022-04-21 DIAGNOSIS — Z23 NEED FOR VACCINATION: Primary | ICD-10-CM

## 2022-04-21 DIAGNOSIS — M25.512 ACUTE PAIN OF LEFT SHOULDER: ICD-10-CM

## 2022-04-21 DIAGNOSIS — I10 BENIGN ESSENTIAL HTN: ICD-10-CM

## 2022-04-21 DIAGNOSIS — L72.3 INFLAMED SEBACEOUS CYST: ICD-10-CM

## 2022-04-21 PROCEDURE — 90677 PCV20 VACCINE IM: CPT | Performed by: INTERNAL MEDICINE

## 2022-04-21 PROCEDURE — 20610 DRAIN/INJ JOINT/BURSA W/O US: CPT | Performed by: INTERNAL MEDICINE

## 2022-04-21 PROCEDURE — G0009 ADMIN PNEUMOCOCCAL VACCINE: HCPCS | Performed by: INTERNAL MEDICINE

## 2022-04-21 PROCEDURE — 99213 OFFICE O/P EST LOW 20 MIN: CPT | Performed by: INTERNAL MEDICINE

## 2022-04-21 RX ORDER — TRIAMCINOLONE ACETONIDE 40 MG/ML
40 INJECTION, SUSPENSION INTRA-ARTICULAR; INTRAMUSCULAR
Status: COMPLETED | OUTPATIENT
Start: 2022-04-21 | End: 2022-04-21

## 2022-04-21 RX ORDER — LIDOCAINE HYDROCHLORIDE 10 MG/ML
1 INJECTION, SOLUTION INFILTRATION; PERINEURAL
Status: COMPLETED | OUTPATIENT
Start: 2022-04-21 | End: 2022-04-21

## 2022-04-21 RX ADMIN — LIDOCAINE HYDROCHLORIDE 1 ML: 10 INJECTION, SOLUTION INFILTRATION; PERINEURAL at 15:06

## 2022-04-21 RX ADMIN — TRIAMCINOLONE ACETONIDE 40 MG: 40 INJECTION, SUSPENSION INTRA-ARTICULAR; INTRAMUSCULAR at 15:06

## 2022-04-21 NOTE — PROGRESS NOTES
"Subjective  Sal Siu is a 86 y.o. female    HPI coming in for evaluation and a follow-up has hypertension complains of severe left shoulder pain without radiation to her neck or chest.  Started about a week ago worse with overhead activities.  Symptoms not related with neck movement or exertion no new trauma has not had similar complaints in the past history of hypertension.  Has a cystic lesion between her breasts which is been growing gradually.  Denies significant pain there    The following portions of the patient's history were reviewed and updated as appropriate: allergies, current medications, past family history, past medical history, past social history, past surgical history, and problem list.     Review of Systems   Constitutional: Negative.  Negative for activity change, appetite change, fatigue and fever.   HENT: Negative for congestion, ear discharge, ear pain and trouble swallowing.    Eyes: Negative for photophobia and visual disturbance.   Respiratory: Negative for cough and shortness of breath.    Cardiovascular: Negative for chest pain and palpitations.   Gastrointestinal: Negative for abdominal distention, abdominal pain, constipation, diarrhea, nausea and vomiting.   Endocrine: Negative.    Genitourinary: Negative for dysuria, hematuria and urgency.   Musculoskeletal: Positive for arthralgias. Negative for back pain, joint swelling and myalgias.   Skin: Positive for wound. Negative for color change and rash.   Allergic/Immunologic: Negative.    Neurological: Negative for dizziness, weakness, light-headedness and headaches.   Hematological: Negative for adenopathy. Does not bruise/bleed easily.   Psychiatric/Behavioral: Positive for sleep disturbance. Negative for agitation, confusion and dysphoric mood. The patient is not nervous/anxious.        Visit Vitals  /70   Pulse 79   Temp 98.2 °F (36.8 °C) (Infrared)   Ht 156.2 cm (61.5\")   Wt 65.3 kg (144 lb)   LMP  (LMP Unknown)   SpO2 96% "   BMI 26.77 kg/m²       Objective  Physical Exam  Constitutional:       General: She is not in acute distress.     Appearance: She is well-developed.   HENT:      Nose: Nose normal.   Eyes:      General: No scleral icterus.     Conjunctiva/sclera: Conjunctivae normal.   Neck:      Thyroid: No thyromegaly.      Trachea: No tracheal deviation.   Cardiovascular:      Rate and Rhythm: Normal rate and regular rhythm.      Heart sounds: No murmur heard.    No friction rub.   Pulmonary:      Effort: No respiratory distress.      Breath sounds: No wheezing or rales.   Abdominal:      General: There is no distension.      Palpations: Abdomen is soft. There is no mass.      Tenderness: There is no abdominal tenderness. There is no guarding.   Musculoskeletal:         General: No deformity. Normal range of motion.   Lymphadenopathy:      Cervical: No cervical adenopathy.   Skin:     General: Skin is warm and dry.      Findings: No erythema or rash.             Comments: Inflamed sebaceous cyst   Neurological:      Mental Status: She is alert and oriented to person, place, and time.      Cranial Nerves: No cranial nerve deficit.      Coordination: Coordination normal.      Deep Tendon Reflexes: Reflexes are normal and symmetric.   Psychiatric:         Behavior: Behavior normal.         Thought Content: Thought content normal.         Judgment: Judgment normal.         Diagnoses and all orders for this visit:    Need for vaccination  -     Pneumococcal Conjugate Vaccine 20-Valent All    Inflamed sebaceous cyst currently without drainage.  Does not appear infected.  Surgical referral made    Benign essential HTN needs better control titrate amlodipine dose up follow BP readings at home    Acute pain of left shoulder suspect rotator cuff disease.  No crepitus on passive movements symptoms not reproducible by movement of her neck.  No rash discussed intra-articular steroid injection she is agreeable with the plan see procedure  "below.  Discussed simple home exercises      Arthrocentesis    Date/Time: 4/21/2022 3:06 PM  Performed by: David Barrios MD  Authorized by: David Barrios MD   Consent: Verbal consent obtained.  Risks and benefits: risks, benefits and alternatives were discussed  Consent given by: patient  Patient understanding: patient states understanding of the procedure being performed  Patient consent: the patient's understanding of the procedure matches consent given  Site marked: the operative site was marked  Patient identity confirmed: verbally with patient  Time out: Immediately prior to procedure a \"time out\" was called to verify the correct patient, procedure, equipment, support staff and site/side marked as required.  Indications: pain and joint swelling   Body area: shoulder    Sedation:  Patient sedated: no    Preparation: Patient was prepped and draped in the usual sterile fashion.  Needle size: 18 G  Ultrasound guidance: no  Aspirate amount: 0 mL  Patient tolerance: patient tolerated the procedure well with no immediate complications        "

## 2022-04-25 DIAGNOSIS — M25.512 ACUTE PAIN OF LEFT SHOULDER: Primary | ICD-10-CM

## 2022-05-02 ENCOUNTER — OFFICE VISIT (OUTPATIENT)
Dept: SURGERY | Facility: CLINIC | Age: 87
End: 2022-05-02

## 2022-05-02 VITALS
TEMPERATURE: 97.7 F | BODY MASS INDEX: 25.58 KG/M2 | SYSTOLIC BLOOD PRESSURE: 138 MMHG | OXYGEN SATURATION: 97 % | HEART RATE: 81 BPM | DIASTOLIC BLOOD PRESSURE: 72 MMHG | WEIGHT: 139 LBS | HEIGHT: 62 IN

## 2022-05-02 DIAGNOSIS — L02.213 CUTANEOUS ABSCESS OF CHEST WALL: ICD-10-CM

## 2022-05-02 DIAGNOSIS — L72.3 SEBACEOUS CYST: Primary | ICD-10-CM

## 2022-05-02 PROCEDURE — 10061 I&D ABSCESS COMP/MULTIPLE: CPT | Performed by: SURGERY

## 2022-05-02 PROCEDURE — 99203 OFFICE O/P NEW LOW 30 MIN: CPT | Performed by: SURGERY

## 2022-05-05 NOTE — PROGRESS NOTES
"Patient: Sal Siu    YOB: 1935    Date: 05/09/2022    Primary Care Provider: David Barrios MD    Chief Complaint   Patient presents with   • Post-op Follow-up     I&D chest wall       History of present illness:  I saw the patient in the office today as a followup from their recent incision and drainage of a cyst on the chest wall. They state that they have done well and are having no problems.  This patient has a residual sebaceous. On the mid chest wall.    Line nontender.    Vital Signs:  Vitals:    05/09/22 1333   BP: 158/78   Pulse: 90   Temp: 98.4 °F (36.9 °C)   TempSrc: Temporal   SpO2: 98%   Weight: 63.5 kg (140 lb)   Height: 156.2 cm (61.5\")       Physical Exam:   General Appearance:    Alert, cooperative, in no acute distress, wound clean dry without infection   Abdomen:     no masses, no organomegaly, soft non-tender, non-distended, no guarding, wounds are well healed   Chest:      Clear to ausculation.  4.5 residual sebaceous cyst on the chest wall, complaining of fairly deep.       Assessment / Plan:    1. Postoperative visit    2. Sebaceous cyst        I did discuss the situation with the patient today in the office and they have done well from their recent lesion excision, I don't think that the patient needs any further intervention and I need to see them back only if they have further problems. Pathology report was reviewed with the patient in the office.  Patient scheduled for excision of sebaceous cyst.  Risk of bleeding infection recurrence discussed and patient agreeable.    Electronically signed by Jaime Garcia MD  05/09/22                      "

## 2022-05-09 ENCOUNTER — OFFICE VISIT (OUTPATIENT)
Dept: SURGERY | Facility: CLINIC | Age: 87
End: 2022-05-09

## 2022-05-09 VITALS
BODY MASS INDEX: 26.43 KG/M2 | TEMPERATURE: 98.4 F | SYSTOLIC BLOOD PRESSURE: 158 MMHG | OXYGEN SATURATION: 98 % | WEIGHT: 140 LBS | DIASTOLIC BLOOD PRESSURE: 78 MMHG | HEIGHT: 61 IN | HEART RATE: 90 BPM

## 2022-05-09 DIAGNOSIS — L72.3 SEBACEOUS CYST: ICD-10-CM

## 2022-05-09 DIAGNOSIS — Z48.89 POSTOPERATIVE VISIT: Primary | ICD-10-CM

## 2022-05-09 PROCEDURE — 99024 POSTOP FOLLOW-UP VISIT: CPT | Performed by: SURGERY

## 2022-05-10 ENCOUNTER — TELEPHONE (OUTPATIENT)
Dept: SURGERY | Facility: CLINIC | Age: 87
End: 2022-05-10

## 2022-05-11 PROBLEM — L72.3 SEBACEOUS CYST: Status: ACTIVE | Noted: 2022-05-11

## 2022-05-11 PROBLEM — Z48.89 POSTOPERATIVE VISIT: Status: ACTIVE | Noted: 2022-05-11

## 2022-05-13 ENCOUNTER — TELEPHONE (OUTPATIENT)
Dept: SURGERY | Facility: CLINIC | Age: 87
End: 2022-05-13

## 2022-05-17 NOTE — PRE-PROCEDURE INSTRUCTIONS
PAT phone history completed with pt for upcoming procedure on  5/18/22 with Dr. Garcia.    PAT PASS GIVEN/REVIEWED WITH PT.  VERBALIZED UNDERSTANDING OF THE FOLLOWING:  DO NOT EAT, DRINK, SMOKE, USE SMOKELESS TOBACCO OR CHEW GUM AFTER MIDNIGHT THE NIGHT BEFORE SURGERY.  THIS ALSO INCLUDES HARD CANDIES AND MINTS.    DO NOT SHAVE THE AREA TO BE OPERATED ON AT LEAST 48 HOURS PRIOR TO THE PROCEDURE.  DO NOT WEAR MAKE UP OR NAIL POLISH.  DO NOT LEAVE IN ANY PIERCING OR WEAR JEWELRY THE DAY OF SURGERY.      DO NOT USE ADHESIVES IF YOU WEAR DENTURES.    DO NOT WEAR EYE CONTACTS; BRING IN YOUR GLASSES.    ONLY TAKE MEDICATION THE MORNING OF YOUR PROCEDURE IF INSTRUCTED BY YOUR SURGEON WITH ENOUGH WATER TO SWALLOW THE MEDICATION.  IF YOUR SURGEON DID NOT SPECIFY WHICH MEDICATIONS TO TAKE, YOU WILL NEED TO CALL THEIR OFFICE FOR FURTHER INSTRUCTIONS AND DO AS THEY INSTRUCT.    LEAVE ANYTHING YOU CONSIDER VALUABLE AT HOME.    YOU WILL NEED TO ARRANGE FOR SOMEONE TO DRIVE YOU HOME AFTER SURGERY.  IT IS RECOMMENDED THAT YOU DO NOT DRIVE, WORK, DRINK ALCOHOL OR MAKE MAJOR DECISIONS FOR AT LEAST 24 HOURS AFTER YOUR PROCEDURE IS COMPLETE.      THE DAY OF YOUR PROCEDURE, BRING IN THE FOLLOWING IF APPLICABLE:   PICTURE ID AND INSURANCE/MEDICARE OR MEDICAID CARDS/ANY CO-PAY THAT MAY BE DUE   COPY OF ADVANCED DIRECTIVE/LIVING WILL/POWER OR    CPAP/BIPAP/INHALERS   SKIN PREP SHEET   YOUR PREADMISSION TESTING PASS (IF NOT A PHONE HISTORY)           COVID self-quarantine instructions reviewed with the pt.  Verbalized understanding.

## 2022-05-18 ENCOUNTER — ANESTHESIA (OUTPATIENT)
Dept: PERIOP | Facility: HOSPITAL | Age: 87
End: 2022-05-18

## 2022-05-18 ENCOUNTER — HOSPITAL ENCOUNTER (OUTPATIENT)
Facility: HOSPITAL | Age: 87
Setting detail: HOSPITAL OUTPATIENT SURGERY
Discharge: HOME | End: 2022-05-18
Attending: SURGERY | Admitting: SURGERY

## 2022-05-18 ENCOUNTER — ANESTHESIA EVENT (OUTPATIENT)
Dept: PERIOP | Facility: HOSPITAL | Age: 87
End: 2022-05-18

## 2022-05-18 VITALS
TEMPERATURE: 98.3 F | WEIGHT: 135 LBS | SYSTOLIC BLOOD PRESSURE: 154 MMHG | HEART RATE: 70 BPM | HEIGHT: 63 IN | RESPIRATION RATE: 16 BRPM | DIASTOLIC BLOOD PRESSURE: 78 MMHG | OXYGEN SATURATION: 97 % | BODY MASS INDEX: 23.92 KG/M2

## 2022-05-18 DIAGNOSIS — L72.3 SEBACEOUS CYST: ICD-10-CM

## 2022-05-18 DIAGNOSIS — Z48.89 POSTOPERATIVE VISIT: ICD-10-CM

## 2022-05-18 PROCEDURE — 25010000002 PROPOFOL 10 MG/ML EMULSION: Performed by: NURSE ANESTHETIST, CERTIFIED REGISTERED

## 2022-05-18 PROCEDURE — 0 CEFAZOLIN SODIUM-DEXTROSE 2-3 GM-%(50ML) RECONSTITUTED SOLUTION: Performed by: SURGERY

## 2022-05-18 PROCEDURE — 12032 INTMD RPR S/A/T/EXT 2.6-7.5: CPT | Performed by: SURGERY

## 2022-05-18 PROCEDURE — 0 LIDOCAINE 1 % SOLUTION: Performed by: SURGERY

## 2022-05-18 PROCEDURE — 88304 TISSUE EXAM BY PATHOLOGIST: CPT

## 2022-05-18 PROCEDURE — 11406 EXC TR-EXT B9+MARG >4.0 CM: CPT | Performed by: SURGERY

## 2022-05-18 RX ORDER — LIDOCAINE HYDROCHLORIDE 10 MG/ML
INJECTION, SOLUTION INFILTRATION; PERINEURAL AS NEEDED
Status: DISCONTINUED | OUTPATIENT
Start: 2022-05-18 | End: 2022-05-18 | Stop reason: HOSPADM

## 2022-05-18 RX ORDER — SODIUM CHLORIDE, SODIUM LACTATE, POTASSIUM CHLORIDE, CALCIUM CHLORIDE 600; 310; 30; 20 MG/100ML; MG/100ML; MG/100ML; MG/100ML
1000 INJECTION, SOLUTION INTRAVENOUS CONTINUOUS
Status: DISCONTINUED | OUTPATIENT
Start: 2022-05-18 | End: 2022-05-18 | Stop reason: HOSPADM

## 2022-05-18 RX ORDER — SODIUM CHLORIDE, SODIUM LACTATE, POTASSIUM CHLORIDE, CALCIUM CHLORIDE 600; 310; 30; 20 MG/100ML; MG/100ML; MG/100ML; MG/100ML
INJECTION, SOLUTION INTRAVENOUS CONTINUOUS PRN
Status: DISCONTINUED | OUTPATIENT
Start: 2022-05-18 | End: 2022-05-18 | Stop reason: SURG

## 2022-05-18 RX ORDER — MAGNESIUM HYDROXIDE 1200 MG/15ML
LIQUID ORAL AS NEEDED
Status: DISCONTINUED | OUTPATIENT
Start: 2022-05-18 | End: 2022-05-18 | Stop reason: HOSPADM

## 2022-05-18 RX ORDER — PROPOFOL 10 MG/ML
VIAL (ML) INTRAVENOUS AS NEEDED
Status: DISCONTINUED | OUTPATIENT
Start: 2022-05-18 | End: 2022-05-18

## 2022-05-18 RX ORDER — PROPOFOL 10 MG/ML
VIAL (ML) INTRAVENOUS AS NEEDED
Status: DISCONTINUED | OUTPATIENT
Start: 2022-05-18 | End: 2022-05-18 | Stop reason: SURG

## 2022-05-18 RX ORDER — CEFAZOLIN SODIUM 2 G/50ML
2 SOLUTION INTRAVENOUS ONCE
Status: COMPLETED | OUTPATIENT
Start: 2022-05-18 | End: 2022-05-18

## 2022-05-18 RX ADMIN — PROPOFOL 10 MG: 10 INJECTION, EMULSION INTRAVENOUS at 13:33

## 2022-05-18 RX ADMIN — CEFAZOLIN SODIUM 2 G: 2 SOLUTION INTRAVENOUS at 13:14

## 2022-05-18 RX ADMIN — PROPOFOL 10 MG: 10 INJECTION, EMULSION INTRAVENOUS at 13:32

## 2022-05-18 RX ADMIN — SODIUM CHLORIDE, POTASSIUM CHLORIDE, SODIUM LACTATE AND CALCIUM CHLORIDE: 600; 310; 30; 20 INJECTION, SOLUTION INTRAVENOUS at 13:05

## 2022-05-18 RX ADMIN — SODIUM CHLORIDE, POTASSIUM CHLORIDE, SODIUM LACTATE AND CALCIUM CHLORIDE 1000 ML: 600; 310; 30; 20 INJECTION, SOLUTION INTRAVENOUS at 12:37

## 2022-05-18 NOTE — ANESTHESIA PREPROCEDURE EVALUATION
Anesthesia Evaluation     Patient summary reviewed and Nursing notes reviewed   history of anesthetic complications: PONV  NPO Solid Status: > 8 hours  NPO Liquid Status: > 8 hours           Airway   Mallampati: II  TM distance: >3 FB  Neck ROM: full  No difficulty expected  Dental    (+) lower dentures and partials    Pulmonary - normal exam   (+) sleep apnea,   (-) not a smoker  Cardiovascular - normal exam  Exercise tolerance: unable to assess    ECG reviewed    (+) hypertension less than 2 medications, dysrhythmias, PVD, hyperlipidemia,     ROS comment: 8/2021 ECG SR with long QT interval    ECHO 9/2020  · Mild tricuspid valve regurgitation is present.  · Estimated EF = 71%.  · Left ventricular systolic function is hyperdynamic (EF > 70).  · Left ventricular diastolic dysfunction (grade I) consistent with impaired relaxation.  · There is mild calcification of the aortic valve mainly affecting the non coronary cusp(s).  · Normal right ventricular cavity size, wall thickness, systolic function and septal motion noted.  · The aortic valve exhibits mild-moderate sclerosis.  · Moderate MAC is present.  · No evidence of pulmonary hypertension is present.  · There is no evidence of pericardial effusion.    Neuro/Psych  (+) dizziness/light headedness,    GI/Hepatic/Renal/Endo    (+)  GERD,      Musculoskeletal     Abdominal  - normal exam   Substance History - negative use  (-) alcohol use, drug use     OB/GYN negative ob/gyn ROS   (-)  Pregnant    Comment: Hysterectomy      Other   arthritis,      ROS/Med Hx Other: Labs reviewed  2020 echo· Mild tricuspid valve regurgitation is present.  · Estimated EF = 71%.  · Left ventricular systolic function is hyperdynamic (EF > 70).  · Left ventricular diastolic dysfunction (grade I) consistent with impaired relaxation.  · There is mild calcification of the aortic valve mainly affecting the non coronary cusp(s).  · Normal right ventricular cavity size, wall thickness, systolic  function and septal motion noted.  · The aortic valve exhibits mild-moderate sclerosis.  · Moderate MAC is present.  · No evidence of pulmonary hypertension is present.  · There is no evidence of pericardial effusion.                            Anesthesia Plan    ASA 3     MAC   (Risks and benefits discussed including risk of aspiration, recall and dental damage. All patient questions answered.    Will continue with plan of care.)  intravenous induction     Anesthetic plan, all risks, benefits, and alternatives have been provided, discussed and informed consent has been obtained with: patient.

## 2022-05-18 NOTE — ANESTHESIA POSTPROCEDURE EVALUATION
Patient: Sal Siu    Procedure Summary     Date: 05/18/22 Room / Location: [facility] OR  /  [facility]    Anesthesia Start: 1314 Anesthesia Stop:     Procedure: EXCISION LESION CHEST WALL (N/A ) Diagnosis:       Postoperative visit      Sebaceous cyst      (Postoperative visit [Z48.89])      (Sebaceous cyst [L72.3])    Surgeons: Jaime Garcia MD Provider: Derek Nicole CRNA    Anesthesia Type: MAC ASA Status: 3          Anesthesia Type: MAC    Vitals  No vitals data found for the desired time range.          Post Anesthesia Care and Evaluation    Patient location during evaluation: PHASE II  Patient participation: complete - patient participated  Level of consciousness: awake  Pain score: 0  Pain management: adequate  Airway patency: patent  Anesthetic complications: No anesthetic complications  PONV Status: none  Cardiovascular status: acceptable  Respiratory status: acceptable and nasal cannula  Hydration status: acceptable    Comments: vsss resp spont, reflexes intact, responsive, report given to pacu nurse.  See R.N. note for postop vital signs.

## 2022-05-18 NOTE — DISCHARGE INSTRUCTIONS
Please follow all post op instructions and follow up appointment time from your physician's office included in your discharge packet.  .  No pushing, pulling, tugging,  heavy lifting, or strenuous activity.  No major decision making, driving, or drinking alcoholic beverages for 24 hours. ( due to the medications you have  received)  Always use good hand hygiene/washing techniques.  NO driving while taking pain medications.    * if you have an incision:  Check your incision area every day for signs of infection.   Check for:  * more redness, swelling, or pain  *more fluid or blood  *warmth  *pus or bad smell     To assist you in voiding:  Drink plenty of fluids  Listen to running water while attempting to void.    If you are unable to urinate and you have an uncomfortable urge to void or it has been   6 hours since you were discharged, return to the Emergency Room

## 2022-05-18 NOTE — OP NOTE
PATIENT:    Sal Siu    DATE OF SURGERY:  5/18/2022    PHYSICIAN:    Jaime Garcia MD    REFERRING PHYSICIAN:  Jaime Garcia MD    YOB: 1935    PREOPERATIVE DIAGNOSIS: Soft tissue mass chest wall    POSTOPERATIVE DIAGNOSIS: Same    PROCEDURE: Excision 6 cm soft tissue mass chest wall with layered closure    Specimen: Chest wall mass    EBL: 10 cc    Complications: None    INDICATIONS:  The patient was sent to me as a consultation by Jaime Garcia MD for evaluation and treatment of a history significant for chest wall mass. They are here now today for wide excision    ANESTHESIA: Sedation with local anesthesia     OPERATIVE PROCEDURE:  The patient was taken to the operating room, placed in the supine position, and given sedation with local anesthesia.  They were prepped and draped in the normal sterile fashion.  They did receive preoperative IV antibiotics.  The nursing staff did perform intraoperative timeout prior to the incision.    Woman slightly indurated in the chest wall mass, a 6 cm x 4 cm elliptical incision was made and the mass excised down to the sternum.  I then closed wound in 2 layers and placed a dressing without complication.  Specimen sent to pathology.    The patient was stable at this point in time and subsequently transferred back to the recovery room in stable condition.       Jaime Garcia MD  5/18/2022  13:42 EDT

## 2022-05-20 LAB — REF LAB TEST METHOD: NORMAL

## 2022-05-25 NOTE — PROGRESS NOTES
"Patient: Sal Siu    YOB: 1935    Date: 05/27/2022    Primary Care Provider: David Barrios MD    Chief Complaint   Patient presents with   • Post-op     Excision cyst between breasts       History of present illness: Patient doing well after excision of chest wall sebaceous cyst.  Having no issues.    Vital Signs:  Vitals:    05/27/22 1008   BP: 128/64   Pulse: 97   Resp: 16   Temp: 97.7 °F (36.5 °C)   TempSrc: Temporal   SpO2: 99%   Height: 160 cm (63\")       Physical Exam:   General Appearance:    Alert, cooperative, in no acute distress, wound clean dry without infection.  Sutures were removed       Assessment / Plan:    1. Postoperative visit        Overall doing well.  Having no issues.  Wound instructions were given.  Follow-up as needed.    Electronically signed by Ant Nguyen MD  05/27/22                      "

## 2022-05-26 ENCOUNTER — OFFICE VISIT (OUTPATIENT)
Dept: ORTHOPEDIC SURGERY | Facility: CLINIC | Age: 87
End: 2022-05-26

## 2022-05-26 DIAGNOSIS — M25.512 ARTHRALGIA OF LEFT SHOULDER REGION: Primary | ICD-10-CM

## 2022-05-26 DIAGNOSIS — M75.42 IMPINGEMENT SYNDROME OF LEFT SHOULDER: ICD-10-CM

## 2022-05-26 PROCEDURE — 99213 OFFICE O/P EST LOW 20 MIN: CPT | Performed by: PHYSICIAN ASSISTANT

## 2022-05-26 NOTE — PROGRESS NOTES
Subjective   Patient ID: Sal Siu is a 86 y.o. right hand dominant female  Pain of the Left Shoulder (Left shoulder pain x 2 months, no known injury, no neck pain)         History of Present Illness    Patient presents with complaints of left shoulder pain that began 2 months prior without injury or trauma. Denies numbness or tingling.    Denies neck pain.   Patient mentions her PCP administered a left shoulder cortisone injection approx 4 weeks ago with no relief in her symptoms.          Past Medical History:   Diagnosis Date   • Arthritis    • Body piercing     EARS ONLY   • Cataract, right eye    • Chronic hypertension    • Constipation    • COVID-19 vaccine series completed    • Dyslipidemia    • GERD (gastroesophageal reflux disease)    • Glaucoma    • Hx of migraines    • Hyperlipidemia    • Hypertensive cardiovascular disease    • Impaired functional mobility, balance, gait, and endurance 08/04/2021   • Incontinence in female    • Intermittent palpitations    • Obstructive sleep apnea     DOES NOT USE BPAP/ CPAP   • PONV (postoperative nausea and vomiting)    • Urgency incontinence    • UTI (urinary tract infection)    • Wears glasses    • Wears partial dentures     LOWER         Past Surgical History:   Procedure Laterality Date   • APPENDECTOMY  1946   • COLONOSCOPY     • DILATATION AND CURETTAGE  1970   • ENDOSCOPY     • EXCISION LESION N/A 5/18/2022    Procedure: EXCISION LESION CHEST WALL;  Surgeon: Jaime Garcia MD;  Location: [facility];  Service: General;  Laterality: N/A;   • EYE CAPSULOTOMY WITH LASER Left 5/22/2018    Procedure: EYE CAPSULOTOMY WITH LASER LEFT;  Surgeon: Meseret Simmons MD;  Location: [facility];  Service: Ophthalmology   • EYE SURGERY Bilateral     CATARACTS REMOVED   • HIP CANNULATED SCREW PLACEMENT Right 8/3/2021    Procedure: HIP CANNULATED SCREWS PLACEMENT, RIGHT;  Surgeon: Pelon Wooten MD;  Location: [facility] OR;  Service: Orthopedics;  Laterality:  Right;   • HYSTERECTOMY  1971    COMPLETE   • LAPAROSCOPIC CHOLECYSTECTOMY     • LIPOMA EXCISION      Lower back   • LUMBAR DISC SURGERY  1997   • OOPHORECTOMY         Family History   Problem Relation Age of Onset   • Heart attack Mother    • Hypertension Mother    • Heart attack Father    • Cancer Father        Social History     Socioeconomic History   • Marital status:    Tobacco Use   • Smoking status: Never Smoker   • Smokeless tobacco: Never Used   Vaping Use   • Vaping Use: Never used   Substance and Sexual Activity   • Alcohol use: No   • Drug use: No   • Sexual activity: Defer         Current Outpatient Medications:   •  alendronate (Fosamax) 70 MG tablet, Take 1 tablet by mouth Every 7 (Seven) Days., Disp: 4 tablet, Rfl: 12  •  amLODIPine (NORVASC) 2.5 MG tablet, Take 1 tablet by mouth 2 (Two) Times a Day., Disp: 180 tablet, Rfl: 3  •  atorvastatin (LIPITOR) 40 MG tablet, Take 1 tablet by mouth once daily, Disp: 90 tablet, Rfl: 0  •  Cholecalciferol (VITAMIN D3) 5000 units capsule capsule, Take 1 capsule by mouth Daily., Disp: 30 capsule, Rfl: 6  •  ciclopirox (LOPROX) 0.77 % cream, Apply  topically to the appropriate area as directed Every Night. Clean nail with alcohol for 7 days prior to application, Disp: 90 g, Rfl: 2  •  ciclopirox (PENLAC) 8 % solution, , Disp: , Rfl:   •  lisinopril (PRINIVIL,ZESTRIL) 40 MG tablet, Take 1 tablet by mouth Daily., Disp: 90 tablet, Rfl: 3  •  meclizine (ANTIVERT) 12.5 MG tablet, Take 1 tablet by mouth 3 (Three) Times a Day As Needed for Dizziness., Disp: 30 tablet, Rfl: 3  •  omeprazole (priLOSEC) 20 MG capsule, Take 1 capsule by mouth Daily., Disp: 90 capsule, Rfl: 3  •  torsemide (DEMADEX) 5 MG tablet, Take 1 tablet by mouth Daily As Needed (swelling). As needed, Disp: 20 tablet, Rfl: 0  •  vitamin B-12 (CYANOCOBALAMIN) 500 MCG tablet, Take 500 mcg by mouth Daily., Disp: , Rfl:     Allergies   Allergen Reactions   • Sulfa Antibiotics Swelling     urticaria    • Other Nausea And Vomiting     MUSHROOMS-CAUSE 'HALLUCINATIONS'       Review of Systems   Constitutional: Negative for fever.   HENT: Negative for dental problem and voice change.    Eyes: Negative for visual disturbance.   Respiratory: Negative for shortness of breath.    Cardiovascular: Negative for chest pain.   Gastrointestinal: Negative for abdominal pain.   Genitourinary: Negative for dysuria.   Musculoskeletal: Positive for arthralgias. Negative for gait problem and joint swelling.   Skin: Negative for rash.   Neurological: Negative for speech difficulty.   Hematological: Does not bruise/bleed easily.   Psychiatric/Behavioral: Negative for confusion.       I have reviewed the medical and surgical history, family history, social history, medications, and/or allergies, and the review of systems of this report.    Objective   LMP  (LMP Unknown)    Physical Exam  Vitals and nursing note reviewed.   Constitutional:       Appearance: Normal appearance.   Pulmonary:      Effort: Pulmonary effort is normal.   Musculoskeletal:      Left shoulder: Tenderness, bony tenderness and crepitus present. No swelling or deformity. Normal strength.      Cervical back: Tenderness present.      Comments: + Spurling's test     Neurological:      Mental Status: She is alert and oriented to person, place, and time.       Left Shoulder Exam     Range of Motion   Active abduction: 120   Passive abduction: 140   Forward flexion: 130   Internal rotation 0 degrees: Lumbar   Internal rotation 90 degrees: 70     Tests   Cross arm: positive  Impingement: positive  Drop arm: positive    Other   Sensation: normal  Pulse: present              Neurologic Exam     Mental Status   Oriented to person, place, and time.               Assessment & Plan   Independent Review of Radiographic Studies:    X-ray of the left shoulder 3 view performed in the office independently reviewed for the evaluation of shoulder pain.  No comparison films available  to view.  No acute fracture or dislocation.  There does appear to be AC joint space narrowing with acromiohumeral interval measuring 7.4 mm.      Procedures       Diagnoses and all orders for this visit:    1. Arthralgia of left shoulder region (Primary)  -     XR Shoulder 2+ View Left; Future  -     MRI shoulder left wo contrast; Future    2. Impingement syndrome of left shoulder  -     MRI shoulder left wo contrast; Future       Orthopedic activities reviewed and patient expressed appreciation  Discussion of orthopedic goals  Risk, benefits, and merits of treatment alternatives reviewed with the patient and questions answered    Recommendations/Plan:  Exercise, medications, injections, other patient advice, and return appointment as noted.  Patient is encouraged to call or return for any issues or concerns.  May use warm heating pad on low setting to the neck and trapezius area.     Patient agreeable to call or return sooner for any concerns.      EMR Dragon-transcription disclaimer:  This encounter note is an electronic transcription of spoken language to printed text.  Electronic transcription of spoken language may permit erroneous or at times nonsensical words or phrases to be inadvertently transcribed.  Although I have reviewed the note for such errors, some may still exist

## 2022-05-27 ENCOUNTER — OFFICE VISIT (OUTPATIENT)
Dept: SURGERY | Facility: CLINIC | Age: 87
End: 2022-05-27

## 2022-05-27 VITALS
TEMPERATURE: 97.7 F | OXYGEN SATURATION: 99 % | HEIGHT: 63 IN | SYSTOLIC BLOOD PRESSURE: 128 MMHG | BODY MASS INDEX: 23.91 KG/M2 | RESPIRATION RATE: 16 BRPM | HEART RATE: 97 BPM | DIASTOLIC BLOOD PRESSURE: 64 MMHG

## 2022-05-27 DIAGNOSIS — Z48.89 POSTOPERATIVE VISIT: Primary | ICD-10-CM

## 2022-05-27 PROCEDURE — 99024 POSTOP FOLLOW-UP VISIT: CPT | Performed by: SURGERY

## 2022-06-22 ENCOUNTER — IMMUNIZATION (OUTPATIENT)
Dept: INTERNAL MEDICINE | Facility: CLINIC | Age: 87
End: 2022-06-22

## 2022-06-22 PROCEDURE — 91305 COVID-19 (PFIZER) 12+ YRS: CPT | Performed by: INTERNAL MEDICINE

## 2022-06-22 PROCEDURE — 0054A COVID-19 (PFIZER) 12+ YRS: CPT | Performed by: INTERNAL MEDICINE

## 2022-07-07 ENCOUNTER — OFFICE VISIT (OUTPATIENT)
Dept: INTERNAL MEDICINE | Facility: CLINIC | Age: 87
End: 2022-07-07

## 2022-07-07 ENCOUNTER — HOSPITAL ENCOUNTER (OUTPATIENT)
Dept: MRI IMAGING | Facility: HOSPITAL | Age: 87
Discharge: HOME | End: 2022-07-07
Admitting: PHYSICIAN ASSISTANT

## 2022-07-07 VITALS
OXYGEN SATURATION: 96 % | WEIGHT: 145 LBS | TEMPERATURE: 97.2 F | HEIGHT: 63 IN | HEART RATE: 74 BPM | DIASTOLIC BLOOD PRESSURE: 84 MMHG | SYSTOLIC BLOOD PRESSURE: 128 MMHG | BODY MASS INDEX: 25.69 KG/M2

## 2022-07-07 DIAGNOSIS — M75.42 IMPINGEMENT SYNDROME OF LEFT SHOULDER: ICD-10-CM

## 2022-07-07 DIAGNOSIS — M25.512 ARTHRALGIA OF LEFT SHOULDER REGION: ICD-10-CM

## 2022-07-07 DIAGNOSIS — N30.01 ACUTE CYSTITIS WITH HEMATURIA: Primary | ICD-10-CM

## 2022-07-07 DIAGNOSIS — R30.9 PAINFUL URINATION: ICD-10-CM

## 2022-07-07 DIAGNOSIS — R35.0 FREQUENCY OF URINATION: ICD-10-CM

## 2022-07-07 LAB
BILIRUB BLD-MCNC: NEGATIVE MG/DL
CLARITY, POC: ABNORMAL
COLOR UR: YELLOW
EXPIRATION DATE: ABNORMAL
GLUCOSE UR STRIP-MCNC: NEGATIVE MG/DL
KETONES UR QL: ABNORMAL
LEUKOCYTE EST, POC: ABNORMAL
Lab: ABNORMAL
NITRITE UR-MCNC: POSITIVE MG/ML
PH UR: 6 [PH] (ref 5–8)
PROT UR STRIP-MCNC: ABNORMAL MG/DL
RBC # UR STRIP: ABNORMAL /UL
SP GR UR: 1.03 (ref 1–1.03)
UROBILINOGEN UR QL: NORMAL

## 2022-07-07 PROCEDURE — 99213 OFFICE O/P EST LOW 20 MIN: CPT | Performed by: FAMILY MEDICINE

## 2022-07-07 PROCEDURE — 73221 MRI JOINT UPR EXTREM W/O DYE: CPT

## 2022-07-07 PROCEDURE — 81003 URINALYSIS AUTO W/O SCOPE: CPT | Performed by: FAMILY MEDICINE

## 2022-07-07 RX ORDER — CEPHALEXIN 500 MG/1
500 CAPSULE ORAL 2 TIMES DAILY
Qty: 14 CAPSULE | Refills: 0 | Status: SHIPPED | OUTPATIENT
Start: 2022-07-07 | End: 2022-08-10

## 2022-07-07 NOTE — PROGRESS NOTES
Sal Siu is a 86 y.o. female.    Chief Complaint   Patient presents with   • Urinary Tract Infection     Pain and frequency       HPI   Patient reports urinary problems for 1 week(s).  They admit to urgency, frequency and flank pain  .  They deny dysuria and lower abdominal pain.  They have tried 2 pills of unknown type and cranberry juice and water.  for this issue.    The following portions of the patient's history were reviewed and updated as appropriate: allergies, current medications, past family history, past medical history, past social history, past surgical history and problem list.     Allergies   Allergen Reactions   • Sulfa Antibiotics Swelling     urticaria   • Other Nausea And Vomiting     MUSHROOMS-CAUSE 'HALLUCINATIONS'         Current Outpatient Medications:   •  alendronate (Fosamax) 70 MG tablet, Take 1 tablet by mouth Every 7 (Seven) Days., Disp: 4 tablet, Rfl: 12  •  amLODIPine (NORVASC) 2.5 MG tablet, Take 1 tablet by mouth 2 (Two) Times a Day., Disp: 180 tablet, Rfl: 3  •  atorvastatin (LIPITOR) 40 MG tablet, Take 1 tablet by mouth once daily, Disp: 90 tablet, Rfl: 0  •  Cholecalciferol (VITAMIN D3) 5000 units capsule capsule, Take 1 capsule by mouth Daily., Disp: 30 capsule, Rfl: 6  •  ciclopirox (LOPROX) 0.77 % cream, Apply  topically to the appropriate area as directed Every Night. Clean nail with alcohol for 7 days prior to application, Disp: 90 g, Rfl: 2  •  ciclopirox (PENLAC) 8 % solution, , Disp: , Rfl:   •  lisinopril (PRINIVIL,ZESTRIL) 40 MG tablet, Take 1 tablet by mouth Daily., Disp: 90 tablet, Rfl: 3  •  meclizine (ANTIVERT) 12.5 MG tablet, Take 1 tablet by mouth 3 (Three) Times a Day As Needed for Dizziness., Disp: 30 tablet, Rfl: 3  •  omeprazole (priLOSEC) 20 MG capsule, Take 1 capsule by mouth Daily., Disp: 90 capsule, Rfl: 3  •  torsemide (DEMADEX) 5 MG tablet, Take 1 tablet by mouth Daily As Needed (swelling). As needed, Disp: 20 tablet, Rfl: 0  •  vitamin B-12  "(CYANOCOBALAMIN) 500 MCG tablet, Take 500 mcg by mouth Daily., Disp: , Rfl:   •  cephalexin (Keflex) 500 MG capsule, Take 1 capsule by mouth 2 (Two) Times a Day., Disp: 14 capsule, Rfl: 0    ROS    Review of Systems   Constitutional: Negative for chills and fever.   Respiratory: Negative for cough and shortness of breath.    Cardiovascular: Negative for chest pain.   Genitourinary: Positive for flank pain, frequency and urgency.       Vitals:    07/07/22 1150   BP: 128/84   Pulse: 74   Temp: 97.2 °F (36.2 °C)   SpO2: 96%   Weight: 65.8 kg (145 lb)   Height: 160 cm (63\")     Body mass index is 25.69 kg/m².    Physical Exam     Physical Exam  Constitutional:       General: She is not in acute distress.     Appearance: Normal appearance. She is well-developed.   HENT:      Head: Normocephalic and atraumatic.      Right Ear: External ear normal.      Left Ear: External ear normal.   Eyes:      Extraocular Movements: Extraocular movements intact.      Conjunctiva/sclera: Conjunctivae normal.   Cardiovascular:      Rate and Rhythm: Normal rate and regular rhythm.      Heart sounds: No murmur heard.  Pulmonary:      Effort: Pulmonary effort is normal. No respiratory distress.      Breath sounds: Normal breath sounds. No wheezing.   Abdominal:      General: Bowel sounds are normal. There is no distension.      Palpations: Abdomen is soft.      Tenderness: There is abdominal tenderness in the suprapubic area. There is right CVA tenderness.   Musculoskeletal:      Right lower leg: No edema.      Left lower leg: No edema.   Skin:     Coloration: Skin is not pale.   Neurological:      Mental Status: She is alert and oriented to person, place, and time.      Cranial Nerves: No cranial nerve deficit.   Psychiatric:         Mood and Affect: Mood normal.         Behavior: Behavior normal.         Assessment/Plan    Problems Addressed this Visit    None     Visit Diagnoses     Acute cystitis with hematuria    -  Primary    Painful " urination        Relevant Orders    POCT urinalysis dipstick, automated (Completed)    Urine Culture - Urine, Urine, Clean Catch    Frequency of urination        Relevant Orders    POCT urinalysis dipstick, automated (Completed)    Urine Culture - Urine, Urine, Clean Catch        Will treat with a round of antibiotics and send urine for culture.     New Medications Ordered This Visit   Medications   • cephalexin (Keflex) 500 MG capsule     Sig: Take 1 capsule by mouth 2 (Two) Times a Day.     Dispense:  14 capsule     Refill:  0       No orders of the defined types were placed in this encounter.      Return if symptoms worsen or fail to improve.    Alexandra Mercado, DO

## 2022-07-12 ENCOUNTER — TELEPHONE (OUTPATIENT)
Dept: ORTHOPEDIC SURGERY | Facility: CLINIC | Age: 87
End: 2022-07-12

## 2022-07-12 LAB
BACTERIA UR CULT: ABNORMAL
BACTERIA UR CULT: ABNORMAL
OTHER ANTIBIOTIC SUSC ISLT: ABNORMAL

## 2022-07-27 RX ORDER — ATORVASTATIN CALCIUM 40 MG/1
TABLET, FILM COATED ORAL
Qty: 90 TABLET | Refills: 3 | Status: SHIPPED | OUTPATIENT
Start: 2022-07-27 | End: 2022-08-02 | Stop reason: SDUPTHER

## 2022-07-27 NOTE — TELEPHONE ENCOUNTER
Rx Refill Note  Requested Prescriptions     Pending Prescriptions Disp Refills   • atorvastatin (LIPITOR) 40 MG tablet [Pharmacy Med Name: Atorvastatin Calcium 40 MG Oral Tablet] 90 tablet 0     Sig: Take 1 tablet by mouth once daily      Last office visit with prescribing clinician: 4/21/2022      Next office visit with prescribing clinician: 10/21/2022            Carol Mcintyre LPN  07/27/22, 17:28 EDT

## 2022-08-02 RX ORDER — ATORVASTATIN CALCIUM 40 MG/1
40 TABLET, FILM COATED ORAL DAILY
Qty: 90 TABLET | Refills: 3 | Status: SHIPPED | OUTPATIENT
Start: 2022-08-02

## 2022-08-02 NOTE — TELEPHONE ENCOUNTER
Caller: Sal Siu    Relationship: Self    Best call back number: [phone]    Requested Prescriptions:   Requested Prescriptions     Pending Prescriptions Disp Refills   • atorvastatin (LIPITOR) 40 MG tablet 90 tablet 3     Sig: Take 1 tablet by mouth Daily.        Pharmacy where request should be sent: [facility] [address] [place] [address] [phone] [facility] [phone] FX     Does the patient have less than a 3 day supply:  [x] Yes  [] No    Orquidea Solo Rep   08/02/22 15:30 EDT

## 2022-08-10 ENCOUNTER — OFFICE VISIT (OUTPATIENT)
Dept: INTERNAL MEDICINE | Facility: CLINIC | Age: 87
End: 2022-08-10

## 2022-08-10 VITALS
TEMPERATURE: 98 F | SYSTOLIC BLOOD PRESSURE: 150 MMHG | WEIGHT: 146 LBS | DIASTOLIC BLOOD PRESSURE: 60 MMHG | BODY MASS INDEX: 25.87 KG/M2 | HEIGHT: 63 IN | OXYGEN SATURATION: 97 % | HEART RATE: 72 BPM

## 2022-08-10 DIAGNOSIS — R42 VERTIGO: ICD-10-CM

## 2022-08-10 DIAGNOSIS — I10 BENIGN ESSENTIAL HTN: Primary | ICD-10-CM

## 2022-08-10 PROCEDURE — 99213 OFFICE O/P EST LOW 20 MIN: CPT | Performed by: FAMILY MEDICINE

## 2022-08-10 RX ORDER — TORSEMIDE 10 MG/1
10 TABLET ORAL DAILY PRN
Qty: 30 TABLET | Refills: 0 | Status: SHIPPED | OUTPATIENT
Start: 2022-08-10

## 2022-08-10 RX ORDER — MECLIZINE HCL 12.5 MG/1
12.5 TABLET ORAL 3 TIMES DAILY PRN
Qty: 30 TABLET | Refills: 0 | Status: SHIPPED | OUTPATIENT
Start: 2022-08-10

## 2022-08-10 NOTE — PROGRESS NOTES
Sal Siu is a 87 y.o. female.    Chief Complaint   Patient presents with   • Hypertension   • Dizziness       HPI   Patient has hypertension.  They are taking torsemide prn, lisinopirl, norvasc.  They have been compliant with medications.  The patient denies any side effects to the medication.  Blood pressure is controlled in the office today.  Blood pressure has been running high at home.  She reports dizziness as well with elevated BP.  She has taken torsemide the last 2-3 days.  She reports increased salt intake.  Admits to ear fullness and itching.     The following portions of the patient's history were reviewed and updated as appropriate: allergies, current medications, past family history, past medical history, past social history, past surgical history and problem list.     Allergies   Allergen Reactions   • Sulfa Antibiotics Swelling     urticaria   • Other Nausea And Vomiting     MUSHROOMS-CAUSE 'HALLUCINATIONS'         Current Outpatient Medications:   •  alendronate (Fosamax) 70 MG tablet, Take 1 tablet by mouth Every 7 (Seven) Days., Disp: 4 tablet, Rfl: 12  •  amLODIPine (NORVASC) 2.5 MG tablet, Take 1 tablet by mouth 2 (Two) Times a Day., Disp: 180 tablet, Rfl: 3  •  atorvastatin (LIPITOR) 40 MG tablet, Take 1 tablet by mouth Daily., Disp: 90 tablet, Rfl: 3  •  Cholecalciferol (VITAMIN D3) 5000 units capsule capsule, Take 1 capsule by mouth Daily., Disp: 30 capsule, Rfl: 6  •  ciclopirox (LOPROX) 0.77 % cream, Apply  topically to the appropriate area as directed Every Night. Clean nail with alcohol for 7 days prior to application, Disp: 90 g, Rfl: 2  •  ciclopirox (PENLAC) 8 % solution, , Disp: , Rfl:   •  lisinopril (PRINIVIL,ZESTRIL) 40 MG tablet, Take 1 tablet by mouth Daily., Disp: 90 tablet, Rfl: 3  •  meclizine (ANTIVERT) 12.5 MG tablet, Take 1 tablet by mouth 3 (Three) Times a Day As Needed for Dizziness., Disp: 30 tablet, Rfl: 0  •  omeprazole (priLOSEC) 20 MG capsule, Take 1 capsule by  "mouth Daily., Disp: 90 capsule, Rfl: 3  •  vitamin B-12 (CYANOCOBALAMIN) 500 MCG tablet, Take 500 mcg by mouth Daily., Disp: , Rfl:   •  torsemide (DEMADEX) 10 MG tablet, Take 1 tablet by mouth Daily As Needed (swelling, high BP)., Disp: 30 tablet, Rfl: 0    ROS    Review of Systems   Constitutional: Negative for chills and fever.   HENT: Positive for congestion.    Respiratory: Positive for shortness of breath.    Cardiovascular: Positive for chest pain.   Neurological: Positive for dizziness and headache.       Vitals:    08/10/22 1144   BP: 150/60   Pulse: 72   Temp: 98 °F (36.7 °C)   SpO2: 97%   Weight: 66.2 kg (146 lb)   Height: 160 cm (63\")     Body mass index is 25.86 kg/m².    Physical Exam     Physical Exam  Constitutional:       General: She is not in acute distress.     Appearance: Normal appearance. She is well-developed.   HENT:      Head: Normocephalic and atraumatic.      Right Ear: External ear normal.      Left Ear: External ear normal.   Eyes:      Extraocular Movements: Extraocular movements intact.      Conjunctiva/sclera: Conjunctivae normal.   Cardiovascular:      Rate and Rhythm: Normal rate and regular rhythm.      Heart sounds: Murmur heard.   Pulmonary:      Effort: Pulmonary effort is normal. No respiratory distress.      Breath sounds: Normal breath sounds. No wheezing.   Abdominal:      General: Bowel sounds are normal. There is no distension.      Palpations: Abdomen is soft.      Tenderness: There is no abdominal tenderness.   Skin:     General: Skin is warm and dry.   Neurological:      Mental Status: She is alert and oriented to person, place, and time.      Cranial Nerves: No cranial nerve deficit.   Psychiatric:         Mood and Affect: Mood normal.         Behavior: Behavior normal.         Assessment/Plan    Problems Addressed this Visit     Benign essential HTN - Primary     May be exacerbated by recent increased salt intake.  Torsemide increased to 10mg and encouraged to take " daily over the next week.  Continue amlodipine and lisinopril as prescribed.          Relevant Medications    torsemide (DEMADEX) 10 MG tablet    Vertigo     May be secondary to elevated BP.  However, patient does seem to have a h/o vertigo or loss of balance.  Encouraged to take meclizine prn.  May try Epley maneuvers at home.  Discussed PT may help with vertigo as well.              New Medications Ordered This Visit   Medications   • torsemide (DEMADEX) 10 MG tablet     Sig: Take 1 tablet by mouth Daily As Needed (swelling, high BP).     Dispense:  30 tablet     Refill:  0   • meclizine (ANTIVERT) 12.5 MG tablet     Sig: Take 1 tablet by mouth 3 (Three) Times a Day As Needed for Dizziness.     Dispense:  30 tablet     Refill:  0       No orders of the defined types were placed in this encounter.      Return if symptoms worsen or fail to improve.    Alexandra Mercado, DO

## 2022-08-15 NOTE — ASSESSMENT & PLAN NOTE
May be exacerbated by recent increased salt intake.  Torsemide increased to 10mg and encouraged to take daily over the next week.  Continue amlodipine and lisinopril as prescribed.

## 2022-08-15 NOTE — ASSESSMENT & PLAN NOTE
May be secondary to elevated BP.  However, patient does seem to have a h/o vertigo or loss of balance.  Encouraged to take meclizine prn.  May try Epley maneuvers at home.  Discussed PT may help with vertigo as well.

## 2022-09-26 ENCOUNTER — OFFICE VISIT (OUTPATIENT)
Dept: CARDIOLOGY | Facility: CLINIC | Age: 87
End: 2022-09-26

## 2022-09-26 VITALS
OXYGEN SATURATION: 97 % | HEART RATE: 71 BPM | BODY MASS INDEX: 26.65 KG/M2 | SYSTOLIC BLOOD PRESSURE: 152 MMHG | WEIGHT: 150.4 LBS | HEIGHT: 63 IN | DIASTOLIC BLOOD PRESSURE: 72 MMHG

## 2022-09-26 DIAGNOSIS — R06.09 DOE (DYSPNEA ON EXERTION): Primary | ICD-10-CM

## 2022-09-26 DIAGNOSIS — I10 PRIMARY HYPERTENSION: ICD-10-CM

## 2022-09-26 DIAGNOSIS — E78.2 MIXED HYPERLIPIDEMIA: ICD-10-CM

## 2022-09-26 PROCEDURE — 99214 OFFICE O/P EST MOD 30 MIN: CPT | Performed by: INTERNAL MEDICINE

## 2022-09-26 NOTE — PROGRESS NOTES
[facility] Cardiology  Office Progress Note  Sal Siu  1935  [address] [address]       Visit Date: 09/26/22    PCP: David Barrios MD  [address] [facility] [address]  [place] [id number]    IDENTIFICATION: A 87 y.o. female  white female, retired /supervisor, from [place], [place].  Former KTS pt 2020    PROBLEM LIST:   1. Chronic hypertension, probably essential.  2. Hypertensive cardiovascular disease:  a. 2007 Acceptable quantitative SPECT gated Cardiolite GXT with reduced exercise capacity, acceptable LVEF (0.80)   3. 9/2020 echocardiogram EF 71% AV sclerosis/MAC rvsp 35  4. Intermittent palpitations.   5. Dyslipidemia.   1. 3/19 LDL: 53  2. 9/20 LDL 47  6. Right infrascapular arthritis type pain with corticosteroid injection (data deficit), February 2009.  7. 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  10. Remote operations:  a. Appendectomy, 1946.  b. Lower back apparent lipoma removal (data deficit).   c. Dilation and curettage, 1970.  d. Hysterectomy, 1971.  e. Lumbar disc surgery, 1997.  f. Laparoscopic cholecystectomy (data deficit).   g. Posterior capsular opacification, left eye, May 2018  h. 8/21 ORIF Cervoni (screw placement) right hip fracture s/p fall.      CC:   Chief Complaint   Patient presents with   • MOSELEY (dyspnea on exertion)       Allergies  Allergies   Allergen Reactions   • Sulfa Antibiotics Swelling     urticaria   • Other Nausea And Vomiting     MUSHROOMS-CAUSE 'HALLUCINATIONS'       Current Medications    Current Outpatient Medications:   •  alendronate (Fosamax) 70 MG tablet, Take 1 tablet by mouth Every 7 (Seven) Days., Disp: 4 tablet, Rfl: 12  •  amLODIPine (NORVASC) 2.5 MG tablet, Take 1 tablet by mouth 2 (Two) Times a Day., Disp: 180 tablet, Rfl: 3  •  atorvastatin (LIPITOR) 40 MG tablet, Take 1 tablet by mouth Daily., Disp: 90 tablet, Rfl: 3  •  Cholecalciferol (VITAMIN D3) 5000  "units capsule capsule, Take 1 capsule by mouth Daily., Disp: 30 capsule, Rfl: 6  •  ciclopirox (LOPROX) 0.77 % cream, Apply  topically to the appropriate area as directed Every Night. Clean nail with alcohol for 7 days prior to application, Disp: 90 g, Rfl: 2  •  ciclopirox (PENLAC) 8 % solution, , Disp: , Rfl:   •  lisinopril (PRINIVIL,ZESTRIL) 40 MG tablet, Take 1 tablet by mouth Daily., Disp: 90 tablet, Rfl: 3  •  meclizine (ANTIVERT) 12.5 MG tablet, Take 1 tablet by mouth 3 (Three) Times a Day As Needed for Dizziness. (Patient taking differently: Take 12.5 mg by mouth As Needed for Dizziness.), Disp: 30 tablet, Rfl: 0  •  omeprazole (priLOSEC) 20 MG capsule, Take 1 capsule by mouth Daily., Disp: 90 capsule, Rfl: 3  •  torsemide (DEMADEX) 10 MG tablet, Take 1 tablet by mouth Daily As Needed (swelling, high BP). (Patient taking differently: Take 5 mg by mouth Daily As Needed (swelling, high BP).), Disp: 30 tablet, Rfl: 0  •  vitamin B-12 (CYANOCOBALAMIN) 500 MCG tablet, Take 500 mcg by mouth Daily., Disp: , Rfl:       History of Present Illness   Sal Siu is a 87 y.o. year old female here for follow up.  Has some gait issues continue to walk with a cane.  She has had some asymmetric swelling left greater than right in her lower extremities.  She does elevate her left leg but has not been using compression stockings.  States she did have a spell with blood pressure being a little bit elevated over the summer and she felt she was utilizing too much sodium.    OBJECTIVE:  Vitals:    09/26/22 0950   BP: 152/72   BP Location: Right arm   Patient Position: Sitting   Pulse: 71   SpO2: 97%   Weight: 68.2 kg (150 lb 6.4 oz)   Height: 160 cm (63\")     Body mass index is 26.64 kg/m².    Constitutional:       Appearance: Healthy appearance. Not in distress.   Neck:      Vascular: No JVR. JVD normal.   Pulmonary:      Effort: Pulmonary effort is normal.      Breath sounds: Normal breath sounds. No wheezing. No rhonchi. " No rales.   Chest:      Chest wall: Not tender to palpatation.   Cardiovascular:      PMI at left midclavicular line. Normal rate. Regular rhythm. Normal S1. Normal S2.      Murmurs: There is a systolic murmur.      No gallop. No click. No rub.   Pulses:     Intact distal pulses.   Edema:     Peripheral edema present.  Abdominal:      General: Bowel sounds are normal.      Palpations: Abdomen is soft.      Tenderness: There is no abdominal tenderness.   Musculoskeletal: Normal range of motion.         General: No tenderness. Skin:     General: Skin is warm and dry.   Neurological:      General: No focal deficit present.      Mental Status: Alert and oriented to person, place and time.         Diagnostic Data:  Procedures      ASSESSMENT:   Diagnosis Plan   1. MOSELEY (dyspnea on exertion)     2. Primary hypertension     3. Mixed hyperlipidemia         PLAN:  Dyspnea multifactorial with valvular sclerosis we will follow-up echocardiogram in the next 2 years.  Likely element of diastolic dysfunction hypertensive heart disease    Hypertension controlled lisinopril amlodipine.  Vascular to be more regular with monitoring her blood pressures at home    Dyslipidemia controlled on statin therapy    Lower extremity swelling utilize compression stockings and elevation may be element of amlodipine      Arron Farias MD, FACC

## 2022-10-09 ENCOUNTER — APPOINTMENT (OUTPATIENT)
Dept: CT IMAGING | Facility: HOSPITAL | Age: 87
End: 2022-10-09

## 2022-10-09 ENCOUNTER — HOSPITAL ENCOUNTER (EMERGENCY)
Facility: HOSPITAL | Age: 87
Discharge: HOME | End: 2022-10-09
Attending: EMERGENCY MEDICINE | Admitting: EMERGENCY MEDICINE

## 2022-10-09 VITALS
SYSTOLIC BLOOD PRESSURE: 151 MMHG | RESPIRATION RATE: 16 BRPM | WEIGHT: 130 LBS | DIASTOLIC BLOOD PRESSURE: 78 MMHG | HEIGHT: 63 IN | OXYGEN SATURATION: 97 % | HEART RATE: 85 BPM | BODY MASS INDEX: 23.04 KG/M2 | TEMPERATURE: 97.9 F

## 2022-10-09 DIAGNOSIS — K57.92 DIVERTICULITIS: Primary | ICD-10-CM

## 2022-10-09 LAB
ALBUMIN SERPL-MCNC: 3.9 G/DL (ref 3.5–5.2)
ALBUMIN/GLOB SERPL: 1.4 G/DL
ALP SERPL-CCNC: 97 U/L (ref 39–117)
ALT SERPL W P-5'-P-CCNC: 21 U/L (ref 1–33)
ANION GAP SERPL CALCULATED.3IONS-SCNC: 10.6 MMOL/L (ref 5–15)
AST SERPL-CCNC: 19 U/L (ref 1–32)
BASOPHILS # BLD AUTO: 0.05 10*3/MM3 (ref 0–0.2)
BASOPHILS NFR BLD AUTO: 0.4 % (ref 0–1.5)
BILIRUB SERPL-MCNC: 1.3 MG/DL (ref 0–1.2)
BUN SERPL-MCNC: 12 MG/DL (ref 8–23)
BUN/CREAT SERPL: 18.2 (ref 7–25)
CALCIUM SPEC-SCNC: 9.1 MG/DL (ref 8.6–10.5)
CHLORIDE SERPL-SCNC: 99 MMOL/L (ref 98–107)
CO2 SERPL-SCNC: 25.4 MMOL/L (ref 22–29)
CREAT SERPL-MCNC: 0.66 MG/DL (ref 0.57–1)
DEPRECATED RDW RBC AUTO: 40.8 FL (ref 37–54)
EGFRCR SERPLBLD CKD-EPI 2021: 85 ML/MIN/1.73
EOSINOPHIL # BLD AUTO: 0.03 10*3/MM3 (ref 0–0.4)
EOSINOPHIL NFR BLD AUTO: 0.3 % (ref 0.3–6.2)
ERYTHROCYTE [DISTWIDTH] IN BLOOD BY AUTOMATED COUNT: 12.1 % (ref 12.3–15.4)
GLOBULIN UR ELPH-MCNC: 2.8 GM/DL
GLUCOSE SERPL-MCNC: 112 MG/DL (ref 65–99)
HCT VFR BLD AUTO: 37.4 % (ref 34–46.6)
HGB BLD-MCNC: 12.2 G/DL (ref 12–15.9)
HOLD SPECIMEN: NORMAL
HOLD SPECIMEN: NORMAL
IMM GRANULOCYTES # BLD AUTO: 0.04 10*3/MM3 (ref 0–0.05)
IMM GRANULOCYTES NFR BLD AUTO: 0.4 % (ref 0–0.5)
LIPASE SERPL-CCNC: 16 U/L (ref 13–60)
LYMPHOCYTES # BLD AUTO: 2.45 10*3/MM3 (ref 0.7–3.1)
LYMPHOCYTES NFR BLD AUTO: 21.5 % (ref 19.6–45.3)
MCH RBC QN AUTO: 29.9 PG (ref 26.6–33)
MCHC RBC AUTO-ENTMCNC: 32.6 G/DL (ref 31.5–35.7)
MCV RBC AUTO: 91.7 FL (ref 79–97)
MONOCYTES # BLD AUTO: 1.28 10*3/MM3 (ref 0.1–0.9)
MONOCYTES NFR BLD AUTO: 11.2 % (ref 5–12)
NEUTROPHILS NFR BLD AUTO: 66.2 % (ref 42.7–76)
NEUTROPHILS NFR BLD AUTO: 7.54 10*3/MM3 (ref 1.7–7)
NRBC BLD AUTO-RTO: 0 /100 WBC (ref 0–0.2)
PLATELET # BLD AUTO: 184 10*3/MM3 (ref 140–450)
PMV BLD AUTO: 12.2 FL (ref 6–12)
POTASSIUM SERPL-SCNC: 4.2 MMOL/L (ref 3.5–5.2)
PROT SERPL-MCNC: 6.7 G/DL (ref 6–8.5)
RBC # BLD AUTO: 4.08 10*6/MM3 (ref 3.77–5.28)
SODIUM SERPL-SCNC: 135 MMOL/L (ref 136–145)
WBC NRBC COR # BLD: 11.39 10*3/MM3 (ref 3.4–10.8)
WHOLE BLOOD HOLD COAG: NORMAL
WHOLE BLOOD HOLD SPECIMEN: NORMAL

## 2022-10-09 PROCEDURE — 85025 COMPLETE CBC W/AUTO DIFF WBC: CPT | Performed by: EMERGENCY MEDICINE

## 2022-10-09 PROCEDURE — 74176 CT ABD & PELVIS W/O CONTRAST: CPT

## 2022-10-09 PROCEDURE — 80053 COMPREHEN METABOLIC PANEL: CPT | Performed by: EMERGENCY MEDICINE

## 2022-10-09 PROCEDURE — 36415 COLL VENOUS BLD VENIPUNCTURE: CPT

## 2022-10-09 PROCEDURE — 83690 ASSAY OF LIPASE: CPT | Performed by: EMERGENCY MEDICINE

## 2022-10-09 PROCEDURE — 99283 EMERGENCY DEPT VISIT LOW MDM: CPT

## 2022-10-09 RX ORDER — AMOXICILLIN AND CLAVULANATE POTASSIUM 875; 125 MG/1; MG/1
1 TABLET, FILM COATED ORAL ONCE
Status: COMPLETED | OUTPATIENT
Start: 2022-10-09 | End: 2022-10-09

## 2022-10-09 RX ORDER — DICYCLOMINE HYDROCHLORIDE 10 MG/1
10 CAPSULE ORAL
Qty: 30 CAPSULE | Refills: 0 | Status: SHIPPED | OUTPATIENT
Start: 2022-10-09 | End: 2022-10-21

## 2022-10-09 RX ORDER — SODIUM CHLORIDE 0.9 % (FLUSH) 0.9 %
10 SYRINGE (ML) INJECTION AS NEEDED
Status: DISCONTINUED | OUTPATIENT
Start: 2022-10-09 | End: 2022-10-09 | Stop reason: HOSPADM

## 2022-10-09 RX ORDER — AMOXICILLIN AND CLAVULANATE POTASSIUM 875; 125 MG/1; MG/1
1 TABLET, FILM COATED ORAL 2 TIMES DAILY
Qty: 14 TABLET | Refills: 0 | Status: SHIPPED | OUTPATIENT
Start: 2022-10-09 | End: 2022-10-21

## 2022-10-09 RX ORDER — DICYCLOMINE HYDROCHLORIDE 10 MG/1
10 CAPSULE ORAL ONCE
Status: COMPLETED | OUTPATIENT
Start: 2022-10-09 | End: 2022-10-09

## 2022-10-09 RX ADMIN — DICYCLOMINE HYDROCHLORIDE 10 MG: 10 CAPSULE ORAL at 19:31

## 2022-10-09 RX ADMIN — AMOXICILLIN AND CLAVULANATE POTASSIUM 1 TABLET: 875; 125 TABLET, FILM COATED ORAL at 19:31

## 2022-10-09 NOTE — ED PROVIDER NOTES
Subjective  History of Present Illness:    Chief Complaint: Abdominal pain  History of Present Illness: 87-year-old female remote history of hysterectomy no other abdominal surgeries, hip fracture 1 year prior, sustained a fall on Thursday 4 days prior.  Ambulates at baseline with a cane.  States has been having diffuse periumbilical abdominal and lower abdominal pain since.  No hip pain no limp  Onset: See above timeline  Duration: Persistent single episode  Exacerbating / Alleviating factors: None  Associated symptoms: None      Nurses Notes reviewed and agree, including vitals, allergies, social history and prior medical history.     REVIEW OF SYSTEMS: All systems reviewed and not pertinent unless noted.    Positive for: Periumbilical and lower abdominal pain, recent fall    Negative for: Weakness numbness deformity, limp, diarrhea chills vomiting hematuria GI bleeding neck pain back pain head injury    Past Medical History:   Diagnosis Date   • Arthritis    • Body piercing     EARS ONLY   • Cataract, right eye    • Chronic hypertension    • Constipation    • COVID-19 vaccine series completed    • Dyslipidemia    • GERD (gastroesophageal reflux disease)    • Glaucoma    • Hx of migraines    • Hyperlipidemia    • Hypertensive cardiovascular disease    • Impaired functional mobility, balance, gait, and endurance 08/04/2021   • Incontinence in female    • Intermittent palpitations    • Obstructive sleep apnea     DOES NOT USE BPAP/ CPAP   • PONV (postoperative nausea and vomiting)    • Urgency incontinence    • UTI (urinary tract infection)    • Wears glasses    • Wears partial dentures     LOWER        Allergies:    Sulfa antibiotics and Other      Past Surgical History:   Procedure Laterality Date   • APPENDECTOMY  1946   • COLONOSCOPY     • DILATATION AND CURETTAGE  1970   • ENDOSCOPY     • EXCISION LESION N/A 5/18/2022    Procedure: EXCISION LESION CHEST WALL;  Surgeon: Jaime Garcia MD;  Location: [facility] OR;  " Service: General;  Laterality: N/A;   • EYE CAPSULOTOMY WITH LASER Left 5/22/2018    Procedure: EYE CAPSULOTOMY WITH LASER LEFT;  Surgeon: Meseret Simmons MD;  Location: [facility];  Service: Ophthalmology   • EYE SURGERY Bilateral     CATARACTS REMOVED   • HIP CANNULATED SCREW PLACEMENT Right 8/3/2021    Procedure: HIP CANNULATED SCREWS PLACEMENT, RIGHT;  Surgeon: Pelon Wooten MD;  Location: [facility];  Service: Orthopedics;  Laterality: Right;   • HYSTERECTOMY  1971    COMPLETE   • LAPAROSCOPIC CHOLECYSTECTOMY     • LIPOMA EXCISION      Lower back   • LUMBAR DISC SURGERY  1997   • OOPHORECTOMY           Social History     Socioeconomic History   • Marital status:    Tobacco Use   • Smoking status: Never   • Smokeless tobacco: Never   Vaping Use   • Vaping Use: Never used   Substance and Sexual Activity   • Alcohol use: No   • Drug use: No   • Sexual activity: Defer         Family History   Problem Relation Age of Onset   • Heart attack Mother    • Hypertension Mother    • Heart attack Father    • Cancer Father        Objective  Physical Exam:  /78   Pulse 85   Temp 97.9 °F (36.6 °C) (Oral)   Resp 16   Ht 160 cm (63\")   Wt 59 kg (130 lb)   LMP  (LMP Unknown)   SpO2 97%   BMI 23.03 kg/m²    CONSTITUTIONAL: Well developed, nontoxic healthy-appearing 87-year-old female,  in no acute distress.  VITAL SIGNS: per nursing, reviewed and noted  SKIN: exposed skin with no rashes, ulcerations or petechiae  EYES: Grossly EOMI, no icterus  ENT: Normal voice.  Patient maintained wearing a mask throughout patient encounter due to coronavirus pandemic  RESPIRATORY:  No increased work of breathing. No retractions.   CARDIOVASCULAR:  regular rate and rhythm, no murmurs.  Good Peripheral pulses. Good cap refill to extremities.   GI: Abdomen soft, nontender, normal bowel sounds. No hernia. No ascites.  MUSCULOSKELETAL:  No tenderness. Full ROM. Strength and tone grossly normal.  no spasms. no neck " or back tenderness or spasm.   NEUROLOGIC: Alert, oriented x 3. No gross deficits. GCS 15.   PSYCH: appropriate affect.  : no bladder tenderness or distention, no CVA tenderness    Procedures    ED Course:  Patient presents for evaluation of abdominal pain over the past 4 days with recent fall.  Low suspicion for musculoskeletal complaint status post fall although abdominal wall sprain is possibility.  We will add labs and CT, disposition pending.  ED Course as of 10/15/22 1852   Sat Oct 15, 2022   1851 CO2: 25.4     [PF]      ED Course User Index  [PF] Jamel Luna W, DO     Lipase 16   CBC & Differential   Final result 10/09 1836   WBC 11.39   RBC 4.08   Hemoglobin 12.2   Hematocrit 37.4   MCV 91.7   MCH 29.9   MCHC 32.6   RDW 12.1   RDW-SD 40.8   MPV 12.2   Platelets 184   Neutrophil Rel % 66.2   Lymphocyte Rel % 21.5   Monocyte Rel % 11.2   Eosinophil Rel % 0.3   Basophil Rel % 0.4   Immature Granulocyte Rel % 0.4   Neutrophils Absolute 7.54   Lymphocytes Absolute 2.45   Monocytes Absolute 1.28   Eosinophils Absolute 0.03   Basophils Absolute 0.05   Immature Grans, Absolute 0.04   nRBC 0.0                         Comprehensive Metabolic Panel   Final result 10/09 1836   Glucose 112   BUN 12   Creatinine 0.66   Sodium 135   Potassium 4.2   Chloride 99   CO2 25.4   Calcium 9.1   Total Protein 6.7   Albumin 3.90   ALT (SGPT) 21   AST (SGOT) 19   Alkaline Phosphatase 97   Total Bilirubin 1.3     CLINICAL HISTORY:  lower / periumbilical pain, recent fall.     FINDINGS:  Abdomen:  The gallbladder has been removed.  The solid abdominal  organs and ureters are unremarkable.  There is sigmoid  diverticulosis with stranding of the fat adjacent to the mid  sigmoid colon, consistent with acute uncomplicated  diverticulitis.  There is a small hiatal hernia of the stomach.  The GI tract is otherwise unremarkable, with no sign of  appendicitis.  Pelvis: The urinary bladder is normal.  The  uterus and ovaries are absent.   There is no pelvic or abdominal  ascites, adenopathy or acute osseous abnormality.     IMPRESSION:  Acute uncomplicated sigmoid diverticulitis.     Authenticated and Electronically Signed by Lenny Kramer M.D. on  10/09/2022 07:45:35 PM                        Lab Results (last 24 hours)     ** No results found for the last 24 hours. **           No radiology results from the last 24 hrs       MDM  Labs reassuring CT consistent with diverticulitis no bony abnormality.  Discharged home symptomatic supportive care recommendations scription Augmentin return precautions discussed.    Final diagnoses:   Diverticulitis          Jamel Luna, DO  10/15/22 8206

## 2022-10-21 ENCOUNTER — OFFICE VISIT (OUTPATIENT)
Dept: INTERNAL MEDICINE | Facility: CLINIC | Age: 87
End: 2022-10-21

## 2022-10-21 VITALS
DIASTOLIC BLOOD PRESSURE: 60 MMHG | BODY MASS INDEX: 26.58 KG/M2 | SYSTOLIC BLOOD PRESSURE: 118 MMHG | TEMPERATURE: 98 F | HEIGHT: 63 IN | HEART RATE: 68 BPM | WEIGHT: 150 LBS | OXYGEN SATURATION: 96 %

## 2022-10-21 DIAGNOSIS — E78.2 MIXED HYPERLIPIDEMIA: ICD-10-CM

## 2022-10-21 DIAGNOSIS — M79.89 LEG SWELLING: ICD-10-CM

## 2022-10-21 DIAGNOSIS — I10 BENIGN ESSENTIAL HTN: Primary | ICD-10-CM

## 2022-10-21 PROBLEM — Z48.89 POSTOPERATIVE VISIT: Status: RESOLVED | Noted: 2022-05-11 | Resolved: 2022-10-21

## 2022-10-21 PROCEDURE — 1159F MED LIST DOCD IN RCRD: CPT | Performed by: INTERNAL MEDICINE

## 2022-10-21 PROCEDURE — 90662 IIV NO PRSV INCREASED AG IM: CPT | Performed by: INTERNAL MEDICINE

## 2022-10-21 PROCEDURE — G0439 PPPS, SUBSEQ VISIT: HCPCS | Performed by: INTERNAL MEDICINE

## 2022-10-21 PROCEDURE — G0008 ADMIN INFLUENZA VIRUS VAC: HCPCS | Performed by: INTERNAL MEDICINE

## 2022-10-21 PROCEDURE — 1170F FXNL STATUS ASSESSED: CPT | Performed by: INTERNAL MEDICINE

## 2022-10-21 NOTE — PROGRESS NOTES
The ABCs of the Annual Wellness Visit  Subsequent Medicare Wellness Visit    Chief Complaint   Patient presents with   • Medicare Wellness-subsequent     Not fasting today       Subjective    History of Present Illness:  Sal Siu is a 87 y.o. female who presents for a Subsequent Medicare Wellness Visit.    The following portions of the patient's history were reviewed and   updated as appropriate: allergies, current medications, past family history, past medical history, past social history, past surgical history and problem list.    Compared to one year ago, the patient feels her physical   health is the same.    Compared to one year ago, the patient feels her mental   health is the same.    Recent Hospitalizations:  She was not admitted to the hospital during the last year.       Current Medical Providers:  Patient Care Team:  David Barrios MD as PCP - General (Internal Medicine)  Arron Farias MD as Consulting Physician (Cardiology)  Olivier Andrade PA-C as Physician Assistant (Physician Assistant)    Outpatient Medications Prior to Visit   Medication Sig Dispense Refill   • alendronate (Fosamax) 70 MG tablet Take 1 tablet by mouth Every 7 (Seven) Days. 4 tablet 12   • amLODIPine (NORVASC) 2.5 MG tablet Take 1 tablet by mouth 2 (Two) Times a Day. 180 tablet 3   • atorvastatin (LIPITOR) 40 MG tablet Take 1 tablet by mouth Daily. 90 tablet 3   • Cholecalciferol (VITAMIN D3) 5000 units capsule capsule Take 1 capsule by mouth Daily. 30 capsule 6   • ciclopirox (LOPROX) 0.77 % cream Apply  topically to the appropriate area as directed Every Night. Clean nail with alcohol for 7 days prior to application 90 g 2   • lisinopril (PRINIVIL,ZESTRIL) 40 MG tablet Take 1 tablet by mouth Daily. 90 tablet 3   • meclizine (ANTIVERT) 12.5 MG tablet Take 1 tablet by mouth 3 (Three) Times a Day As Needed for Dizziness. (Patient taking differently: Take 1 tablet by mouth As Needed for Dizziness.) 30 tablet 0   •  omeprazole (priLOSEC) 20 MG capsule Take 1 capsule by mouth Daily. 90 capsule 3   • torsemide (DEMADEX) 10 MG tablet Take 1 tablet by mouth Daily As Needed (swelling, high BP). (Patient taking differently: Take 5 mg by mouth Daily As Needed (swelling, high BP).) 30 tablet 0   • vitamin B-12 (CYANOCOBALAMIN) 500 MCG tablet Take 500 mcg by mouth Daily.     • amoxicillin-clavulanate (AUGMENTIN) 875-125 MG per tablet Take 1 tablet by mouth 2 (Two) Times a Day. 14 tablet 0   • ciclopirox (PENLAC) 8 % solution      • dicyclomine (BENTYL) 10 MG capsule Take 1 capsule by mouth 4 (Four) Times a Day Before Meals & at Bedtime. As needed for abdominal cramping. 30 capsule 0     No facility-administered medications prior to visit.       No opioid medication identified on active medication list. I have reviewed chart for other potential  high risk medication/s and harmful drug interactions in the elderly.          Aspirin is not on active medication list.  Aspirin use is not indicated based on review of current medical condition/s. Risk of harm outweighs potential benefits.  .    Patient Active Problem List   Diagnosis   • Benign essential HTN   • Traumatic closed nondisplaced fracture of neck of right femur (HCC)   • Mixed hyperlipidemia   • Sebaceous cyst   • Vertigo     Advance Care Planning  Advance Directive is not on file.  ACP discussion was held with the patient during this visit. Patient has an advance directive (not in EMR), copy requested.    Review of Systems   Constitutional: Negative for activity change, appetite change, fatigue and fever.   HENT: Negative for congestion, ear discharge, ear pain and trouble swallowing.    Eyes: Negative for photophobia and visual disturbance.   Respiratory: Negative for cough and shortness of breath.    Cardiovascular: Positive for leg swelling. Negative for chest pain and palpitations.   Gastrointestinal: Negative for abdominal distention, constipation, diarrhea, nausea and  "vomiting.   Genitourinary: Negative for dysuria, hematuria and urgency.   Musculoskeletal: Positive for arthralgias. Negative for back pain, joint swelling and myalgias.   Skin: Negative for color change and rash.   Neurological: Negative for dizziness, weakness, light-headedness and confusion.   Hematological: Negative for adenopathy. Does not bruise/bleed easily.   Psychiatric/Behavioral: Positive for sleep disturbance. Negative for agitation and dysphoric mood. The patient is not nervous/anxious.         Objective    Vitals:    10/21/22 0854   BP: 118/60   Pulse: 68   Temp: 98 °F (36.7 °C)   TempSrc: Infrared   SpO2: 96%   Weight: 68 kg (150 lb)   Height: 160 cm (63\")   PainSc: 0-No pain     Estimated body mass index is 26.57 kg/m² as calculated from the following:    Height as of this encounter: 160 cm (63\").    Weight as of this encounter: 68 kg (150 lb).    BMI is >= 25 and <30. (Overweight) The following options were offered after discussion;: nutrition counseling/recommendations      Does the patient have evidence of cognitive impairment? No    Physical Exam  Constitutional:       General: She is not in acute distress.     Appearance: She is well-developed.   HENT:      Nose: Nose normal.   Eyes:      General: No scleral icterus.     Conjunctiva/sclera: Conjunctivae normal.   Neck:      Thyroid: No thyromegaly.      Trachea: No tracheal deviation.   Cardiovascular:      Rate and Rhythm: Normal rate and regular rhythm.      Heart sounds: No murmur heard.    No friction rub.   Pulmonary:      Effort: No respiratory distress.      Breath sounds: No wheezing or rales.   Abdominal:      General: There is no distension.      Palpations: Abdomen is soft. There is no mass.      Tenderness: There is no abdominal tenderness. There is no guarding.   Musculoskeletal:         General: No deformity. Normal range of motion.   Lymphadenopathy:      Cervical: No cervical adenopathy.   Skin:     General: Skin is warm and dry. "      Findings: No erythema or rash.   Neurological:      Mental Status: She is alert and oriented to person, place, and time.      Cranial Nerves: No cranial nerve deficit.      Coordination: Coordination normal.      Deep Tendon Reflexes: Reflexes are normal and symmetric.   Psychiatric:         Behavior: Behavior normal.         Thought Content: Thought content normal.         Judgment: Judgment normal.                 HEALTH RISK ASSESSMENT    Smoking Status:  Social History     Tobacco Use   Smoking Status Never   Smokeless Tobacco Never     Alcohol Consumption:  Social History     Substance and Sexual Activity   Alcohol Use No     Fall Risk Screen:    STEADI Fall Risk Assessment was completed, and patient is at MODERATE risk for falls. Assessment completed on:10/21/2022    Depression Screening:  PHQ-2/PHQ-9 Depression Screening 10/21/2022   Retired PHQ-9 Total Score -   Retired Total Score -   Little Interest or Pleasure in Doing Things 0-->not at all   Feeling Down, Depressed or Hopeless 0-->not at all   Trouble Falling or Staying Asleep, or Sleeping Too Much -   Feeling Tired or Having Little Energy -   Poor Appetite or Overeating -   Feeling Bad about Yourself - or that You are a Failure or Have Let Yourself or Your Family Down -   Trouble Concentrating on Things, Such as Reading the Newspaper or Watching Television -   Moving or Speaking So Slowly that Other People Could Have Noticed? Or the Opposite - Being So Fidgety -   Thoughts that You Would be Better Off Dead or of Hurting Yourself in Some Way -   PHQ-9: Brief Depression Severity Measure Score 0       Health Habits and Functional and Cognitive Screening:  Functional & Cognitive Status 10/21/2022   Do you have difficulty preparing food and eating? No   Do you have difficulty bathing yourself, getting dressed or grooming yourself? No   Do you have difficulty using the toilet? No   Do you have difficulty moving around from place to place? No   Do you  have trouble with steps or getting out of a bed or a chair? No   Current Diet Well Balanced Diet   Dental Exam Up to date        Dental Exam Comment -   Eye Exam Not up to date        Eye Exam Comment -   Exercise (times per week) 2 times per week   Current Exercises Include (No Data)        Exercise Comment PT type exercises    Current Exercise Activities Include -   Do you need help using the phone?  No   Are you deaf or do you have serious difficulty hearing?  No   Do you need help with transportation? No   Do you need help shopping? No   Do you need help preparing meals?  No   Do you need help with housework?  No   Do you need help with laundry? No   Do you need help taking your medications? No   Do you need help managing money? No   Do you ever drive or ride in a car without wearing a seat belt? No   Have you felt unusual stress, anger or loneliness in the last month? No   Who do you live with? Spouse   If you need help, do you have trouble finding someone available to you? No   Have you been bothered in the last four weeks by sexual problems? No   Do you have difficulty concentrating, remembering or making decisions? Yes       Age-appropriate Screening Schedule:  Refer to the list below for future screening recommendations based on patient's age, sex and/or medical conditions. Orders for these recommended tests are listed in the plan section. The patient has been provided with a written plan.    Health Maintenance   Topic Date Due   • LIPID PANEL  09/25/2021   • INFLUENZA VACCINE  08/01/2022   • TDAP/TD VACCINES (2 - Td or Tdap) 12/17/2023   • DXA SCAN  03/11/2024              Assessment & Plan   CMS Preventative Services Quick Reference  Risk Factors Identified During Encounter  Polypharmacy  Urinary Incontinence  The above risks/problems have been discussed with the patient.  Follow up actions/plans if indicated are seen below in the Assessment/Plan Section.  Pertinent information has been shared with the  patient in the After Visit Summary.    Diagnoses and all orders for this visit:    1. Benign essential HTN (Primary) stable with current meds and low-salt diet    2. Mixed hyperlipidemia continue with the dietary restrictions tolerating statins well    3. Leg swelling stable uses Demadex occasionally.  Discussed leg elevation and compression stockings        Follow Up:   No follow-ups on file.     An After Visit Summary and PPPS were made available to the patient.

## 2023-04-17 ENCOUNTER — OFFICE VISIT (OUTPATIENT)
Dept: CARDIOLOGY | Facility: CLINIC | Age: 88
End: 2023-04-17
Payer: MEDICARE

## 2023-04-17 VITALS
DIASTOLIC BLOOD PRESSURE: 90 MMHG | BODY MASS INDEX: 27 KG/M2 | WEIGHT: 152.4 LBS | HEIGHT: 63 IN | SYSTOLIC BLOOD PRESSURE: 162 MMHG | HEART RATE: 69 BPM | OXYGEN SATURATION: 98 %

## 2023-04-17 DIAGNOSIS — R06.09 DOE (DYSPNEA ON EXERTION): Primary | ICD-10-CM

## 2023-04-17 DIAGNOSIS — E78.2 MIXED HYPERLIPIDEMIA: ICD-10-CM

## 2023-04-17 DIAGNOSIS — I10 PRIMARY HYPERTENSION: ICD-10-CM

## 2023-04-17 PROCEDURE — 99214 OFFICE O/P EST MOD 30 MIN: CPT | Performed by: INTERNAL MEDICINE

## 2023-04-17 RX ORDER — LISINOPRIL 40 MG/1
40 TABLET ORAL DAILY
Qty: 90 TABLET | Refills: 3 | Status: SHIPPED | OUTPATIENT
Start: 2023-04-17

## 2023-04-17 RX ORDER — AMLODIPINE BESYLATE 5 MG/1
5 TABLET ORAL DAILY
Start: 2023-04-17

## 2023-04-17 NOTE — LETTER
April 17, 2023     David Barrios MD  [address] [facility] [address]  [facility] [address]    Patient: Sal Siu   YOB: 1935   Date of Visit: 4/17/2023       Dear Dr. Denis MD:    Thank you for referring Sal Siu to me for evaluation. Below are the relevant portions of my assessment and plan of care.    If you have questions, please do not hesitate to call me. I look forward to following Sal along with you.         Sincerely,        Arron Farias MD        CC: No Recipients    Arron Farias MD  04/17/23 1437  Signed  [facility] Cardiology  Office Progress Note  Sal Siu  1935  [address]  [facility] [address]       Visit Date: 04/17/23    PCP: David Barrios MD  [address] [facility] [address]  [facility] [address]    IDENTIFICATION: A 87 y.o. female  white female, retired /supervisor, from [place], [place].  Former [facility] pt 2020    PROBLEM LIST:   1. Chronic hypertension, probably essential.  2. Hypertensive cardiovascular disease:  a. 2007 Acceptable quantitative SPECT gated Cardiolite GXT with reduced exercise capacity, acceptable LVEF (0.80)   3. 9/2020 echocardiogram EF 71% AV sclerosis/MAC rvsp 35  4. Intermittent palpitations.   5. Dyslipidemia.   1. 3/19 LDL: 53  2. 9/20 LDL 47  6. Right infrascapular arthritis type pain with corticosteroid injection (data deficit), February 2009.  7. 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  10. Remote operations:  a. Appendectomy, 1946.  b. Lower back apparent lipoma removal (data deficit).   c. Dilation and curettage, 1970.  d. Hysterectomy, 1971.  e. Lumbar disc surgery, 1997.  f. Laparoscopic cholecystectomy (data deficit).   g. Posterior capsular opacification, left eye, May 2018  h. 8/21 ORIF Cervoni (screw placement) right hip fracture s/p fall.      CC:   Chief Complaint   Patient presents with   • MOSELEY (dyspnea on exertion)       Allergies  Allergies  "  Allergen Reactions   • Sulfa Antibiotics Swelling     urticaria   • Other Nausea And Vomiting     MUSHROOMS-CAUSE 'HALLUCINATIONS'       Current Medications    Current Outpatient Medications:   •  alendronate (Fosamax) 70 MG tablet, Take 1 tablet by mouth Every 7 (Seven) Days., Disp: 4 tablet, Rfl: 12  •  amLODIPine (NORVASC) 2.5 MG tablet, Take 1 tablet by mouth 2 (Two) Times a Day., Disp: 180 tablet, Rfl: 3  •  atorvastatin (LIPITOR) 40 MG tablet, Take 1 tablet by mouth Daily., Disp: 90 tablet, Rfl: 3  •  Cholecalciferol (VITAMIN D3) 5000 units capsule capsule, Take 1 capsule by mouth Daily., Disp: 30 capsule, Rfl: 6  •  ciclopirox (LOPROX) 0.77 % cream, Apply  topically to the appropriate area as directed Every Night. Clean nail with alcohol for 7 days prior to application, Disp: 90 g, Rfl: 2  •  lisinopril (PRINIVIL,ZESTRIL) 40 MG tablet, Take 1 tablet by mouth Daily., Disp: 90 tablet, Rfl: 3  •  meclizine (ANTIVERT) 12.5 MG tablet, Take 1 tablet by mouth 3 (Three) Times a Day As Needed for Dizziness. (Patient taking differently: Take 1 tablet by mouth As Needed for Dizziness.), Disp: 30 tablet, Rfl: 0  •  omeprazole (priLOSEC) 20 MG capsule, Take 1 capsule by mouth Daily., Disp: 90 capsule, Rfl: 3  •  torsemide (DEMADEX) 10 MG tablet, Take 1 tablet by mouth Daily As Needed (swelling, high BP). (Patient taking differently: Take 5 mg by mouth Daily As Needed (swelling, high BP).), Disp: 30 tablet, Rfl: 0  •  vitamin B-12 (CYANOCOBALAMIN) 500 MCG tablet, Take 1 tablet by mouth Daily., Disp: , Rfl:       History of Present Illness   Sal Siu is a 87 y.o. year old female here for follow up.  Headache with uncontrolled hypertension at home.  Does ADLs with no regular activity    OBJECTIVE:  Vitals:    04/17/23 1346   BP: 162/90   BP Location: Right arm   Patient Position: Sitting   Pulse: 69   SpO2: 98%   Weight: 69.1 kg (152 lb 6.4 oz)   Height: 158.8 cm (62.5\")     Body mass index is 27.43 " kg/m².    Constitutional:       Appearance: Healthy appearance. Not in distress.   Neck:      Vascular: No JVR. JVD normal.   Pulmonary:      Effort: Pulmonary effort is normal.      Breath sounds: Normal breath sounds. No wheezing. No rhonchi. No rales.   Chest:      Chest wall: Not tender to palpatation.   Cardiovascular:      PMI at left midclavicular line. Normal rate. Regular rhythm. Normal S1. Normal S2.      Murmurs: There is a systolic murmur.      No gallop. No click. No rub.   Pulses:     Intact distal pulses.   Edema:     Peripheral edema present.  Abdominal:      General: Bowel sounds are normal.      Palpations: Abdomen is soft.      Tenderness: There is no abdominal tenderness.   Musculoskeletal: Normal range of motion.         General: No tenderness. Skin:     General: Skin is warm and dry.   Neurological:      General: No focal deficit present.      Mental Status: Alert and oriented to person, place and time.         Diagnostic Data:  Procedures      ASSESSMENT:   Diagnosis Plan   1. MOSELEY (dyspnea on exertion)        2. Primary hypertension        3. Mixed hyperlipidemia            PLAN:  Dyspnea multifactorial with valvular sclerosis we will follow-up echocardiogram in the next 2 years.  Likely element of diastolic dysfunction hypertensive heart disease    Hypertension not controlled lisinopril amlodipine.  Will increase amlodipine to maximum recommended dose of 10 mg daily.  She will contact our office in 1 week with hemodynamic    Dyslipidemia controlled on statin therapy          Arron Farias MD, FACC

## 2023-04-17 NOTE — PROGRESS NOTES
[facility] Cardiology  Office Progress Note  Sal Siu  1935  [address] [facility] [address]       Visit Date: 04/17/23    PCP: David Barrios MD  [address] [facility] [address]  [facility] [address]    IDENTIFICATION: A 87 y.o. female  white female, retired /supervisor, from [place], [place].  Former [facility] pt 2020    PROBLEM LIST:   1. Chronic hypertension, probably essential.  2. Hypertensive cardiovascular disease:  a. 2007 Acceptable quantitative SPECT gated Cardiolite GXT with reduced exercise capacity, acceptable LVEF (0.80)   3. 9/2020 echocardiogram EF 71% AV sclerosis/MAC rvsp 35  4. Intermittent palpitations.   5. Dyslipidemia.   1. 3/19 LDL: 53  2. 9/20 LDL 47  6. Right infrascapular arthritis type pain with corticosteroid injection (data deficit), February 2009.  7. 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  10. Remote operations:  a. Appendectomy, 1946.  b. Lower back apparent lipoma removal (data deficit).   c. Dilation and curettage, 1970.  d. Hysterectomy, 1971.  e. Lumbar disc surgery, 1997.  f. Laparoscopic cholecystectomy (data deficit).   g. Posterior capsular opacification, left eye, May 2018  h. 8/21 ORIF Cervoni (screw placement) right hip fracture s/p fall.      CC:   Chief Complaint   Patient presents with   • MOSELEY (dyspnea on exertion)       Allergies  Allergies   Allergen Reactions   • Sulfa Antibiotics Swelling     urticaria   • Other Nausea And Vomiting     MUSHROOMS-CAUSE 'HALLUCINATIONS'       Current Medications    Current Outpatient Medications:   •  alendronate (Fosamax) 70 MG tablet, Take 1 tablet by mouth Every 7 (Seven) Days., Disp: 4 tablet, Rfl: 12  •  amLODIPine (NORVASC) 2.5 MG tablet, Take 1 tablet by mouth 2 (Two) Times a Day., Disp: 180 tablet, Rfl: 3  •  atorvastatin (LIPITOR) 40 MG tablet, Take 1 tablet by mouth Daily., Disp: 90 tablet, Rfl: 3  •  Cholecalciferol (VITAMIN D3) 5000 units  "capsule capsule, Take 1 capsule by mouth Daily., Disp: 30 capsule, Rfl: 6  •  ciclopirox (LOPROX) 0.77 % cream, Apply  topically to the appropriate area as directed Every Night. Clean nail with alcohol for 7 days prior to application, Disp: 90 g, Rfl: 2  •  lisinopril (PRINIVIL,ZESTRIL) 40 MG tablet, Take 1 tablet by mouth Daily., Disp: 90 tablet, Rfl: 3  •  meclizine (ANTIVERT) 12.5 MG tablet, Take 1 tablet by mouth 3 (Three) Times a Day As Needed for Dizziness. (Patient taking differently: Take 1 tablet by mouth As Needed for Dizziness.), Disp: 30 tablet, Rfl: 0  •  omeprazole (priLOSEC) 20 MG capsule, Take 1 capsule by mouth Daily., Disp: 90 capsule, Rfl: 3  •  torsemide (DEMADEX) 10 MG tablet, Take 1 tablet by mouth Daily As Needed (swelling, high BP). (Patient taking differently: Take 5 mg by mouth Daily As Needed (swelling, high BP).), Disp: 30 tablet, Rfl: 0  •  vitamin B-12 (CYANOCOBALAMIN) 500 MCG tablet, Take 1 tablet by mouth Daily., Disp: , Rfl:       History of Present Illness   Sal Siu is a 87 y.o. year old female here for follow up.  Headache with uncontrolled hypertension at home.  Does ADLs with no regular activity    OBJECTIVE:  Vitals:    04/17/23 1346   BP: 162/90   BP Location: Right arm   Patient Position: Sitting   Pulse: 69   SpO2: 98%   Weight: 69.1 kg (152 lb 6.4 oz)   Height: 158.8 cm (62.5\")     Body mass index is 27.43 kg/m².    Constitutional:       Appearance: Healthy appearance. Not in distress.   Neck:      Vascular: No JVR. JVD normal.   Pulmonary:      Effort: Pulmonary effort is normal.      Breath sounds: Normal breath sounds. No wheezing. No rhonchi. No rales.   Chest:      Chest wall: Not tender to palpatation.   Cardiovascular:      PMI at left midclavicular line. Normal rate. Regular rhythm. Normal S1. Normal S2.      Murmurs: There is a systolic murmur.      No gallop. No click. No rub.   Pulses:     Intact distal pulses.   Edema:     Peripheral edema " present.  Abdominal:      General: Bowel sounds are normal.      Palpations: Abdomen is soft.      Tenderness: There is no abdominal tenderness.   Musculoskeletal: Normal range of motion.         General: No tenderness. Skin:     General: Skin is warm and dry.   Neurological:      General: No focal deficit present.      Mental Status: Alert and oriented to person, place and time.         Diagnostic Data:  Procedures      ASSESSMENT:   Diagnosis Plan   1. MOSELEY (dyspnea on exertion)        2. Primary hypertension        3. Mixed hyperlipidemia            PLAN:  Dyspnea multifactorial with valvular sclerosis we will follow-up echocardiogram in the next 2 years.  Likely element of diastolic dysfunction hypertensive heart disease    Hypertension not controlled lisinopril amlodipine.  Will increase amlodipine to maximum recommended dose of 10 mg daily.  She will contact our office in 1 week with hemodynamic    Dyslipidemia controlled on statin therapy          Arron Farias MD, FACC

## 2023-04-27 ENCOUNTER — OFFICE VISIT (OUTPATIENT)
Dept: INTERNAL MEDICINE | Facility: CLINIC | Age: 88
End: 2023-04-27
Payer: MEDICARE

## 2023-04-27 VITALS
HEIGHT: 62 IN | DIASTOLIC BLOOD PRESSURE: 70 MMHG | HEART RATE: 74 BPM | BODY MASS INDEX: 28.34 KG/M2 | WEIGHT: 154 LBS | SYSTOLIC BLOOD PRESSURE: 138 MMHG | OXYGEN SATURATION: 97 % | TEMPERATURE: 97.1 F

## 2023-04-27 DIAGNOSIS — I10 BENIGN ESSENTIAL HTN: Primary | ICD-10-CM

## 2023-04-27 DIAGNOSIS — R42 VERTIGO: ICD-10-CM

## 2023-04-27 DIAGNOSIS — E78.2 MIXED HYPERLIPIDEMIA: ICD-10-CM

## 2023-04-27 PROCEDURE — 1159F MED LIST DOCD IN RCRD: CPT | Performed by: INTERNAL MEDICINE

## 2023-04-27 PROCEDURE — 1160F RVW MEDS BY RX/DR IN RCRD: CPT | Performed by: INTERNAL MEDICINE

## 2023-04-27 PROCEDURE — 99214 OFFICE O/P EST MOD 30 MIN: CPT | Performed by: INTERNAL MEDICINE

## 2023-04-27 NOTE — PROGRESS NOTES
Subjective  Sal Siu is a 87 y.o. female    HPI coming in for follow-up has had some short-term memory deficit for about 2 years behaviorally stable some of the history provided by her close friend who is accompanying her.  Has had intermittent dizzy spells but no headaches or visual disturbances history of hypertension.  Her amlodipine dose was recently increased by cardiology.  Patient has been complaining of some shortness of breath echocardiogram with suggestion of diastolic dysfunction.  She complains of lower extremity swelling for which she is on torsemide as needed    The following portions of the patient's history were reviewed and updated as appropriate: allergies, current medications, past family history, past medical history, past social history, past surgical history, and problem list.     Review of Systems   Constitutional: Negative.  Negative for activity change, appetite change, fatigue and fever.   HENT: Negative for congestion, ear discharge, ear pain and trouble swallowing.    Eyes: Negative for photophobia and visual disturbance.   Respiratory: Negative for cough and shortness of breath.    Cardiovascular: Positive for leg swelling. Negative for chest pain and palpitations.   Gastrointestinal: Negative for abdominal distention, abdominal pain, constipation, diarrhea, nausea and vomiting.   Endocrine: Negative.    Genitourinary: Negative for dysuria, hematuria and urgency.   Musculoskeletal: Positive for arthralgias. Negative for back pain, joint swelling and myalgias.   Skin: Negative for color change and rash.   Allergic/Immunologic: Negative.    Neurological: Negative for dizziness, weakness, light-headedness and headaches.   Hematological: Negative for adenopathy. Does not bruise/bleed easily.   Psychiatric/Behavioral: Positive for sleep disturbance. Negative for agitation, confusion and dysphoric mood. The patient is not nervous/anxious.        Visit Vitals  /70   Pulse 74   Temp  "97.1 °F (36.2 °C)   Ht 157.5 cm (62\")   Wt 69.9 kg (154 lb)   LMP  (LMP Unknown)   SpO2 97%   BMI 28.17 kg/m²       Objective  Physical Exam  Constitutional:       General: She is not in acute distress.     Appearance: She is well-developed.   HENT:      Nose: Nose normal.   Eyes:      General: No scleral icterus.     Conjunctiva/sclera: Conjunctivae normal.   Neck:      Thyroid: No thyromegaly.      Trachea: No tracheal deviation.   Cardiovascular:      Rate and Rhythm: Normal rate and regular rhythm.      Heart sounds: No murmur heard.    No friction rub.   Pulmonary:      Effort: No respiratory distress.      Breath sounds: No wheezing or rales.   Abdominal:      General: There is no distension.      Palpations: Abdomen is soft. There is no mass.      Tenderness: There is no abdominal tenderness. There is no guarding.   Musculoskeletal:         General: Swelling and deformity present. Normal range of motion.   Lymphadenopathy:      Cervical: No cervical adenopathy.   Skin:     General: Skin is warm and dry.      Findings: No erythema or rash.   Neurological:      Mental Status: She is alert and oriented to person, place, and time.      Cranial Nerves: No cranial nerve deficit.      Coordination: Coordination normal.      Deep Tendon Reflexes: Reflexes are normal and symmetric.   Psychiatric:         Behavior: Behavior normal.         Thought Content: Thought content normal.         Judgment: Judgment normal.         Diagnoses and all orders for this visit:    Benign essential HTN stable with current meds and low-salt diet advised leg elevation and compression stocking if needed for the leg swelling    Mixed hyperlipidemia continue with the dietary restrictions and the statin which she is tolerating well    Vertigo stable meclizine as needed        "

## 2023-08-01 ENCOUNTER — APPOINTMENT (OUTPATIENT)
Dept: GENERAL RADIOLOGY | Facility: HOSPITAL | Age: 88
End: 2023-08-01
Payer: MEDICARE

## 2023-08-01 ENCOUNTER — HOSPITAL ENCOUNTER (EMERGENCY)
Facility: HOSPITAL | Age: 88
Discharge: HOME | End: 2023-08-01
Attending: EMERGENCY MEDICINE | Admitting: EMERGENCY MEDICINE
Payer: MEDICARE

## 2023-08-01 VITALS
DIASTOLIC BLOOD PRESSURE: 77 MMHG | HEART RATE: 77 BPM | BODY MASS INDEX: 26.68 KG/M2 | SYSTOLIC BLOOD PRESSURE: 189 MMHG | OXYGEN SATURATION: 97 % | TEMPERATURE: 98.2 F | HEIGHT: 62 IN | WEIGHT: 145 LBS | RESPIRATION RATE: 20 BRPM

## 2023-08-01 DIAGNOSIS — R07.81 RIB PAIN ON RIGHT SIDE: ICD-10-CM
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
## 2023-08-01 PROCEDURE — 73502 X-RAY EXAM HIP UNI 2-3 VIEWS: CPT

## 2023-08-01 PROCEDURE — 71101 X-RAY EXAM UNILAT RIBS/CHEST: CPT

## 2023-08-01 PROCEDURE — 99282 EMERGENCY DEPT VISIT SF MDM: CPT

## 2023-09-14 DIAGNOSIS — R26.81 UNSTEADY GAIT: Primary | ICD-10-CM

## 2023-09-18 ENCOUNTER — PATIENT MESSAGE (OUTPATIENT)
Dept: INTERNAL MEDICINE | Facility: CLINIC | Age: 88
End: 2023-09-18
Payer: MEDICARE

## 2023-09-18 NOTE — TELEPHONE ENCOUNTER
Referral placed on 9/14 and is in Scheduling Review with  PT. They will be contacted in the order it was received. They can call the PT office, if they'd like. Ph [phone].    I'm not sure if it matters, but [facility] is not on-site at the hospital. [facility].

## 2023-09-18 NOTE — TELEPHONE ENCOUNTER
From: Sal Siu  To: David Barrios  Sent: 9/18/2023 8:55 AM EDT  Subject: Physical Therapy    This message is being sent by Michelle Zacarias on behalf of Sal Siu.    Hi Dr. Barrios,    I would like to try the PT at the hospital. Rose nor I have received a call to schedule my visits.    Thank you,   Sal Siu

## 2023-09-19 RX ORDER — AMLODIPINE BESYLATE 2.5 MG/1
TABLET ORAL
Qty: 180 TABLET | Refills: 0 | OUTPATIENT
Start: 2023-09-19

## 2023-09-26 ENCOUNTER — CLINICAL SUPPORT (OUTPATIENT)
Dept: INTERNAL MEDICINE | Facility: CLINIC | Age: 88
End: 2023-09-26
Payer: MEDICARE

## 2023-09-26 VITALS
BODY MASS INDEX: 26.68 KG/M2 | DIASTOLIC BLOOD PRESSURE: 80 MMHG | HEART RATE: 79 BPM | HEIGHT: 62 IN | OXYGEN SATURATION: 96 % | SYSTOLIC BLOOD PRESSURE: 160 MMHG | WEIGHT: 145 LBS

## 2023-09-26 RX ORDER — AMLODIPINE BESYLATE 5 MG/1
5 TABLET ORAL DAILY
Qty: 90 TABLET | Refills: 3 | Status: SHIPPED | OUTPATIENT
Start: 2023-09-26

## 2023-09-26 RX ORDER — AMLODIPINE BESYLATE 5 MG/1
5 TABLET ORAL DAILY
Qty: 90 TABLET | Refills: 3
Start: 2023-09-26 | End: 2023-09-26 | Stop reason: SDUPTHER

## 2023-09-28 ENCOUNTER — TELEPHONE (OUTPATIENT)
Dept: INTERNAL MEDICINE | Facility: CLINIC | Age: 88
End: 2023-09-28
Payer: MEDICARE

## 2023-09-28 NOTE — TELEPHONE ENCOUNTER
S/w Dr. Barrios regarding patient's symptoms. He is okay to see patient tomorrow. Scheduled patient for 09/29/2023 at 12:45.

## 2023-09-28 NOTE — TELEPHONE ENCOUNTER
"Dr. Barrios requested that I call patient to gather an update on how she is feeling.   Patient states that she still feels dizzy and she has a headache.   Patient is unable to get a reading on her wrist BP cuff-it states \"error\".    Patient is taking her Amlodipine 5 MG QD.   Patient has not relayed this information to her cardiologist.       "

## 2023-09-28 NOTE — TELEPHONE ENCOUNTER
Caller: Sal Siu    Relationship to patient: Self    Best call back number: [phone]     Chief complaint: FU HIGH BLOOD PRESSURE    Type of visit: FOLLOWUP    Requested date: AS SOON AS POSSIBLE    If rescheduling, when is the original appointment:     Additional notes: PATIENT WOULD LIKE TO SEE DR SKAGGS, BUT HE DID NOT HAVE ANY APPOINTMENTS UNTIL 10/16.

## 2023-09-29 ENCOUNTER — OFFICE VISIT (OUTPATIENT)
Dept: INTERNAL MEDICINE | Facility: CLINIC | Age: 88
End: 2023-09-29
Payer: MEDICARE

## 2023-09-29 VITALS
BODY MASS INDEX: 27.75 KG/M2 | OXYGEN SATURATION: 98 % | DIASTOLIC BLOOD PRESSURE: 77 MMHG | WEIGHT: 150.8 LBS | HEIGHT: 62 IN | TEMPERATURE: 97.3 F | SYSTOLIC BLOOD PRESSURE: 159 MMHG | HEART RATE: 71 BPM

## 2023-09-29 DIAGNOSIS — E78.2 MIXED HYPERLIPIDEMIA: ICD-10-CM

## 2023-09-29 DIAGNOSIS — R30.0 DYSURIA: ICD-10-CM

## 2023-09-29 DIAGNOSIS — I10 BENIGN ESSENTIAL HTN: Primary | ICD-10-CM

## 2023-09-29 PROBLEM — S72.001A: Status: RESOLVED | Noted: 2021-08-02 | Resolved: 2023-09-29

## 2023-09-29 LAB
BILIRUB BLD-MCNC: NEGATIVE MG/DL
CLARITY, POC: ABNORMAL
COLOR UR: ABNORMAL
EXPIRATION DATE: ABNORMAL
GLUCOSE UR STRIP-MCNC: NEGATIVE MG/DL
KETONES UR QL: NEGATIVE
LEUKOCYTE EST, POC: ABNORMAL
Lab: ABNORMAL
NITRITE UR-MCNC: NEGATIVE MG/ML
PH UR: 6 [PH] (ref 5–8)
PROT UR STRIP-MCNC: ABNORMAL MG/DL
RBC # UR STRIP: ABNORMAL /UL
SP GR UR: 1.02 (ref 1–1.03)
UROBILINOGEN UR QL: ABNORMAL

## 2023-09-29 PROCEDURE — 99214 OFFICE O/P EST MOD 30 MIN: CPT | Performed by: INTERNAL MEDICINE

## 2023-09-29 PROCEDURE — 81003 URINALYSIS AUTO W/O SCOPE: CPT | Performed by: INTERNAL MEDICINE

## 2023-09-29 NOTE — PROGRESS NOTES
"Subjective  Sal Siu is a 88 y.o. female    HPI coming in accompanied by her caregiver who is giving us some of the history since the patient has had some short-term memory deficit and is a poor historian elevated BP reading noted recently but she had stopped taking amlodipine has resumed that now has occasional headaches denies visual disturbances has complaints of dysuria without gross hematuria    The following portions of the patient's history were reviewed and updated as appropriate: allergies, current medications, past family history, past medical history, past social history, past surgical history, and problem list.     Review of Systems   Constitutional:  Positive for fatigue. Negative for activity change, appetite change and fever.   HENT:  Negative for congestion, ear discharge, ear pain and trouble swallowing.    Eyes:  Negative for photophobia and visual disturbance.   Respiratory:  Negative for cough and shortness of breath.    Cardiovascular:  Negative for chest pain and palpitations.   Gastrointestinal:  Negative for abdominal distention, abdominal pain, constipation, diarrhea, nausea and vomiting.   Endocrine: Negative.    Genitourinary:  Negative for dysuria, hematuria and urgency.   Musculoskeletal:  Positive for arthralgias. Negative for back pain, joint swelling and myalgias.   Skin:  Negative for color change and rash.   Allergic/Immunologic: Negative.    Neurological:  Negative for dizziness, weakness, light-headedness and headaches.   Hematological:  Negative for adenopathy. Does not bruise/bleed easily.   Psychiatric/Behavioral:  Negative for agitation, confusion and dysphoric mood. The patient is not nervous/anxious.      Visit Vitals  /77   Pulse 71   Temp 97.3 °F (36.3 °C)   Ht 157.5 cm (62.01\")   Wt 68.4 kg (150 lb 12.8 oz)   LMP  (LMP Unknown)   SpO2 98%   BMI 27.57 kg/m²       Objective  Physical Exam  Constitutional:       General: She is not in acute distress.     " Appearance: She is well-developed.   HENT:      Nose: Nose normal.   Eyes:      General: No scleral icterus.     Conjunctiva/sclera: Conjunctivae normal.   Neck:      Thyroid: No thyromegaly.      Trachea: No tracheal deviation.   Cardiovascular:      Rate and Rhythm: Normal rate and regular rhythm.      Heart sounds: No murmur heard.    No friction rub.   Pulmonary:      Effort: No respiratory distress.      Breath sounds: No wheezing or rales.   Abdominal:      General: There is no distension.      Palpations: Abdomen is soft. There is no mass.      Tenderness: There is no abdominal tenderness. There is no guarding.   Musculoskeletal:         General: Deformity present. Normal range of motion.   Lymphadenopathy:      Cervical: No cervical adenopathy.   Skin:     General: Skin is warm and dry.      Findings: No erythema or rash.   Neurological:      Mental Status: She is alert and oriented to person, place, and time.      Cranial Nerves: No cranial nerve deficit.      Coordination: Coordination normal.      Deep Tendon Reflexes: Reflexes are normal and symmetric.   Psychiatric:         Behavior: Behavior normal.         Thought Content: Thought content normal.         Judgment: Judgment normal.       Diagnoses and all orders for this visit:    Benign essential HTN BP control reasonable urged compliance continue with low-sodium diet    Dysuria currently with mild symptoms we will check culture    Mixed hyperlipidemia continue with the dietary restrictions.  Atorvastatin 40 mg daily

## 2023-09-30 LAB
ALBUMIN SERPL-MCNC: 4.6 G/DL (ref 3.5–5.2)
ALBUMIN/GLOB SERPL: 1.9 G/DL
ALP SERPL-CCNC: 100 U/L (ref 39–117)
ALT SERPL-CCNC: 16 U/L (ref 1–33)
AST SERPL-CCNC: 12 U/L (ref 1–32)
BILIRUB SERPL-MCNC: 0.7 MG/DL (ref 0–1.2)
BUN SERPL-MCNC: 12 MG/DL (ref 8–23)
BUN/CREAT SERPL: 17.4 (ref 7–25)
CALCIUM SERPL-MCNC: 9.8 MG/DL (ref 8.6–10.5)
CHLORIDE SERPL-SCNC: 107 MMOL/L (ref 98–107)
CO2 SERPL-SCNC: 26.8 MMOL/L (ref 22–29)
CREAT SERPL-MCNC: 0.69 MG/DL (ref 0.57–1)
EGFRCR SERPLBLD CKD-EPI 2021: 83.6 ML/MIN/1.73
ERYTHROCYTE [DISTWIDTH] IN BLOOD BY AUTOMATED COUNT: 12.4 % (ref 12.3–15.4)
GLOBULIN SER CALC-MCNC: 2.4 GM/DL
GLUCOSE SERPL-MCNC: 99 MG/DL (ref 65–99)
HCT VFR BLD AUTO: 38.8 % (ref 34–46.6)
HGB BLD-MCNC: 13 G/DL (ref 12–15.9)
MCH RBC QN AUTO: 30.2 PG (ref 26.6–33)
MCHC RBC AUTO-ENTMCNC: 33.5 G/DL (ref 31.5–35.7)
MCV RBC AUTO: 90.2 FL (ref 79–97)
PLATELET # BLD AUTO: 219 10*3/MM3 (ref 140–450)
POTASSIUM SERPL-SCNC: 4 MMOL/L (ref 3.5–5.2)
PROT SERPL-MCNC: 7 G/DL (ref 6–8.5)
RBC # BLD AUTO: 4.3 10*6/MM3 (ref 3.77–5.28)
SODIUM SERPL-SCNC: 143 MMOL/L (ref 136–145)
WBC # BLD AUTO: 7.91 10*3/MM3 (ref 3.4–10.8)

## 2023-10-09 LAB
BACTERIA UR CULT: ABNORMAL
BACTERIA UR CULT: ABNORMAL
OTHER ANTIBIOTIC SUSC ISLT: ABNORMAL

## 2023-10-18 RX ORDER — LISINOPRIL 40 MG/1
40 TABLET ORAL DAILY
Qty: 90 TABLET | Refills: 3 | Status: SHIPPED | OUTPATIENT
Start: 2023-10-18

## 2023-10-20 ENCOUNTER — OFFICE VISIT (OUTPATIENT)
Dept: INTERNAL MEDICINE | Facility: CLINIC | Age: 88
End: 2023-10-20
Payer: MEDICARE

## 2023-10-20 VITALS
HEIGHT: 62 IN | BODY MASS INDEX: 28.12 KG/M2 | WEIGHT: 152.8 LBS | SYSTOLIC BLOOD PRESSURE: 151 MMHG | DIASTOLIC BLOOD PRESSURE: 78 MMHG | HEART RATE: 70 BPM | OXYGEN SATURATION: 98 % | TEMPERATURE: 97.7 F

## 2023-10-20 DIAGNOSIS — I10 BENIGN ESSENTIAL HTN: Primary | ICD-10-CM

## 2023-10-20 DIAGNOSIS — M79.89 LEG SWELLING: ICD-10-CM

## 2023-10-20 DIAGNOSIS — G44.219 EPISODIC TENSION-TYPE HEADACHE, NOT INTRACTABLE: ICD-10-CM

## 2023-10-20 NOTE — PROGRESS NOTES
"Subjective  Sal Siu is a 88 y.o. female    HPI coming in for follow-up has had occasional headaches she is concerned about elevated BP.  Has check some readings at home which show an occasional rise in her systolic pressure.  Reasonably compliant with her diet.  Has had this complains of right ankle swelling no new trauma denies chest pain or shortness of breath she is compliant with her medications    The following portions of the patient's history were reviewed and updated as appropriate: allergies, current medications, past family history, past medical history, past social history, past surgical history, and problem list.     Review of Systems   Constitutional:  Positive for fatigue. Negative for activity change, appetite change and fever.   HENT:  Negative for congestion, ear discharge, ear pain and trouble swallowing.    Eyes:  Negative for photophobia and visual disturbance.   Respiratory:  Negative for cough and shortness of breath.    Cardiovascular:  Negative for chest pain and palpitations.   Gastrointestinal:  Negative for abdominal distention, abdominal pain, constipation, diarrhea, nausea and vomiting.   Endocrine: Negative.    Genitourinary:  Negative for dysuria, hematuria and urgency.   Musculoskeletal:  Positive for arthralgias. Negative for back pain, joint swelling and myalgias.   Skin:  Negative for color change and rash.   Allergic/Immunologic: Negative.    Neurological:  Positive for headaches. Negative for dizziness, weakness and light-headedness.   Hematological:  Negative for adenopathy. Does not bruise/bleed easily.   Psychiatric/Behavioral:  Negative for agitation, confusion and dysphoric mood. The patient is not nervous/anxious.        Visit Vitals  /78   Pulse 70   Temp 97.7 °F (36.5 °C)   Ht 157.5 cm (62.01\")   Wt 69.3 kg (152 lb 12.8 oz)   LMP  (LMP Unknown)   SpO2 98%   BMI 27.94 kg/m²       Objective  Physical Exam  Constitutional:       General: She is not in acute " distress.     Appearance: She is well-developed.   HENT:      Nose: Nose normal.   Eyes:      General: No scleral icterus.     Conjunctiva/sclera: Conjunctivae normal.   Neck:      Thyroid: No thyromegaly.      Trachea: No tracheal deviation.   Cardiovascular:      Rate and Rhythm: Normal rate and regular rhythm.      Heart sounds: No murmur heard.     No friction rub.   Pulmonary:      Effort: No respiratory distress.      Breath sounds: No wheezing or rales.   Abdominal:      General: There is no distension.      Palpations: Abdomen is soft. There is no mass.      Tenderness: There is no abdominal tenderness. There is no guarding.   Musculoskeletal:         General: No deformity. Normal range of motion.   Lymphadenopathy:      Cervical: No cervical adenopathy.   Skin:     General: Skin is warm and dry.      Findings: No erythema or rash.   Neurological:      Mental Status: She is alert and oriented to person, place, and time.      Cranial Nerves: No cranial nerve deficit.      Coordination: Coordination normal.      Deep Tendon Reflexes: Reflexes are normal and symmetric.   Psychiatric:         Behavior: Behavior normal.         Thought Content: Thought content normal.         Judgment: Judgment normal.       Diagnoses and all orders for this visit:    Benign essential HTN BP control seems to be reasonable continue with amlodipine along with lisinopril continue with walking regimen and low-salt diet    Episodic tension-type headache, not intractable Tylenol as needed.  Reassured patient that her headaches are not due to poor BP control    Leg swelling side effect from amlodipine advised leg elevation compression stockings as needed    Other orders  -     Fluzone High-Dose 65+yrs ([id number])

## 2023-10-23 ENCOUNTER — TELEPHONE (OUTPATIENT)
Dept: CARDIOLOGY | Facility: CLINIC | Age: 88
End: 2023-10-23
Payer: MEDICARE

## 2023-10-23 NOTE — TELEPHONE ENCOUNTER
Patient calling to tell us her blood pressure has been on the higher side of 140s and having a head ache. She couldn't give me readings because she doesn't take them everyday.   I advised her to take her blood pressure daily after taking any blood pressure medication and call us back to let us know after a few days. Also advised her to let us know if any worsening symptoms.

## 2023-11-07 RX ORDER — ATORVASTATIN CALCIUM 40 MG/1
40 TABLET, FILM COATED ORAL DAILY
Qty: 90 TABLET | Refills: 3 | Status: SHIPPED | OUTPATIENT
Start: 2023-11-07

## 2023-11-13 RX ORDER — TORSEMIDE 10 MG/1
10 TABLET ORAL DAILY PRN
Qty: 30 TABLET | Refills: 0 | Status: SHIPPED | OUTPATIENT
Start: 2023-11-13

## 2023-11-15 ENCOUNTER — TELEPHONE (OUTPATIENT)
Dept: CARDIOLOGY | Facility: CLINIC | Age: 88
End: 2023-11-15
Payer: MEDICARE

## 2023-11-15 NOTE — TELEPHONE ENCOUNTER
Patient walked in the office and brought BP log.     156/70  163/71  164/72  206/83  192/76  192/82  182/68  167/67  156/71  163/71  159/69  144/69  189/72  175/67  163/62  160/59  183/79  184/67  160/60  164/65  162/69  142/65  137/64  124/63    Spoke with daughter, Rose. She reports the patient has dementia and takes her medication at night. Taking lisinopril 40 mg daily and amlodipine 5 mg daily. She reports her  has been to the ER multiple times the last week. Told her that taking her BP too frequently along with increased stress and using a wrist monitor, will increase readings.     She fixes her medication planner every Sunday for her. Advised her to try and have her take her medications in the AM. Try and only take BP and hour and a half after taking medications, again in the evening and if feeling bad.     Advised her she could increase amlodipine to 10 mg daily. To call us back or MyChart the readings next week after making these changes. She verbalized understanding.

## 2023-11-17 ENCOUNTER — TELEPHONE (OUTPATIENT)
Dept: INTERNAL MEDICINE | Facility: CLINIC | Age: 88
End: 2023-11-17
Payer: MEDICARE

## 2023-11-17 NOTE — TELEPHONE ENCOUNTER
Caller: DELVIN DIAZ    Relationship: [facility], [place]     Best call back number: [phone]     What was the call regarding: DELVIN CALLED TO CHECK THE STATUS OF A FAX THAT WAS SENT 11-16-23.

## 2023-11-20 RX ORDER — AMLODIPINE BESYLATE 5 MG/1
5 TABLET ORAL DAILY
Qty: 90 TABLET | Refills: 3 | Status: SHIPPED | OUTPATIENT
Start: 2023-11-20

## 2023-11-29 ENCOUNTER — TELEPHONE (OUTPATIENT)
Dept: CARDIOLOGY | Facility: CLINIC | Age: 88
End: 2023-11-29
Payer: MEDICARE

## 2023-11-29 RX ORDER — AMLODIPINE BESYLATE 10 MG/1
10 TABLET ORAL DAILY
Qty: 90 TABLET | Refills: 3 | Status: SHIPPED | OUTPATIENT
Start: 2023-11-29

## 2023-11-29 NOTE — TELEPHONE ENCOUNTER
The daughter walked into the [facility] today to provide a BP log. I spoke with her a couple weeks ago for her mothers elevated BP. I advised her to increase amlodipine to 10 mg. She is already on lisinopril 40 as well.     11/16/2023 BP /59, HR 78 /60, HR 63  11/17/2023 BP /60, HR 63 /59, HR 63  11/18/2023 BP /55 /65, HR 66  11/19/2023 BP /63 /64, HR 74  11/20/2023 BP /61 /63, HR 74  11/21/2023 BP /59, HR 75 /71, HR 80  11/22/2023 BP /60, HR 76 /55, HR 73  11/23/2023 BP /63, HR 79 /64, HR 78  11/24/2023 BP /55 /60    She did mention that she was using a wrist cuff and was under some stress with her husbands health issues. BP has improved from previous readings. Called daughter back and left a message. Told her to continue current meds, keep BP log and call back if anything changes.

## 2023-12-11 RX ORDER — TORSEMIDE 10 MG/1
10 TABLET ORAL DAILY PRN
Qty: 30 TABLET | Refills: 0 | Status: SHIPPED | OUTPATIENT
Start: 2023-12-11

## 2023-12-29 ENCOUNTER — OFFICE VISIT (OUTPATIENT)
Dept: INTERNAL MEDICINE | Facility: CLINIC | Age: 88
End: 2023-12-29
Payer: MEDICARE

## 2023-12-29 VITALS
BODY MASS INDEX: 28.73 KG/M2 | HEIGHT: 62 IN | TEMPERATURE: 97.7 F | DIASTOLIC BLOOD PRESSURE: 68 MMHG | SYSTOLIC BLOOD PRESSURE: 130 MMHG | HEART RATE: 70 BPM | WEIGHT: 156.12 LBS | OXYGEN SATURATION: 98 %

## 2023-12-29 DIAGNOSIS — M79.89 LEG SWELLING: ICD-10-CM

## 2023-12-29 DIAGNOSIS — I10 BENIGN ESSENTIAL HTN: Primary | ICD-10-CM

## 2023-12-29 DIAGNOSIS — E78.2 MIXED HYPERLIPIDEMIA: ICD-10-CM

## 2023-12-29 RX ORDER — TORSEMIDE 10 MG/1
20 TABLET ORAL DAILY PRN
Qty: 60 TABLET | Refills: 11 | Status: SHIPPED | OUTPATIENT
Start: 2023-12-29

## 2023-12-29 NOTE — PROGRESS NOTES
The ABCs of the Annual Wellness Visit  Subsequent Medicare Wellness Visit    Subjective    Sal Siu is a 88 y.o. female who presents for a Subsequent Medicare Wellness Visit.    The following portions of the patient's history were reviewed and   updated as appropriate: allergies, current medications, past family history, past medical history, past social history, past surgical history, and problem list.    Compared to one year ago, the patient feels her physical   health is worse.    Compared to one year ago, the patient feels her mental   health is worse.    Recent Hospitalizations:  She was not admitted to the hospital during the last year.       Current Medical Providers:  Patient Care Team:  David Barrios MD as PCP - General (Internal Medicine)  Arron Farias MD as Consulting Physician (Cardiology)  Olivier Andrade PA-C as Physician Assistant (Physician Assistant)    Outpatient Medications Prior to Visit   Medication Sig Dispense Refill    amLODIPine (NORVASC) 10 MG tablet Take 1 tablet by mouth Daily. 90 tablet 3    atorvastatin (LIPITOR) 40 MG tablet Take 1 tablet by mouth Daily. 90 tablet 3    Cholecalciferol (VITAMIN D3) 5000 units capsule capsule Take 1 capsule by mouth Daily. 30 capsule 6    ciclopirox (LOPROX) 0.77 % cream Apply  topically to the appropriate area as directed Every Night. Clean nail with alcohol for 7 days prior to application (Patient taking differently: Apply  topically to the appropriate area as directed Every Night. Clean nail with alcohol for 7 days prior to application As needed) 90 g 2    lisinopril (PRINIVIL,ZESTRIL) 40 MG tablet Take 1 tablet by mouth Daily. 90 tablet 3    meclizine (ANTIVERT) 12.5 MG tablet Take 1 tablet by mouth 3 (Three) Times a Day As Needed for Dizziness. (Patient taking differently: Take 1 tablet by mouth As Needed for Dizziness.) 30 tablet 0    torsemide (DEMADEX) 10 MG tablet TAKE 1 TABLET BY MOUTH DAILY AS NEEDED (SWELLING, HIGH BP). 30  "tablet 0    triamcinolone (KENALOG) 0.1 % ointment Apply 1 application topically to the appropriate area as directed 2 (Two) Times a Day. 30 g 0    vitamin B-12 (CYANOCOBALAMIN) 500 MCG tablet Take 1 tablet by mouth Daily.      alendronate (Fosamax) 70 MG tablet Take 1 tablet by mouth Every 7 (Seven) Days. 4 tablet 12    omeprazole (priLOSEC) 20 MG capsule Take 1 capsule by mouth Daily. 90 capsule 3     No facility-administered medications prior to visit.       No opioid medication identified on active medication list. I have reviewed chart for other potential  high risk medication/s and harmful drug interactions in the elderly.        Aspirin is not on active medication list.  Aspirin use is not indicated based on review of current medical condition/s. Risk of harm outweighs potential benefits.  .    Patient Active Problem List   Diagnosis    Benign essential HTN    Mixed hyperlipidemia    Sebaceous cyst    Vertigo     Advance Care Planning   Advance Care Planning     Advance Directive is not on file.  ACP discussion was held with the patient during this visit. Patient has an advance directive (not in EMR), copy requested.     Objective    Vitals:    12/29/23 0911   BP: 130/68   Pulse: 70   Temp: 97.7 °F (36.5 °C)   SpO2: 98%   Weight: 70.8 kg (156 lb 1.9 oz)   Height: 157.5 cm (62.01\")     Estimated body mass index is 28.55 kg/m² as calculated from the following:    Height as of this encounter: 157.5 cm (62.01\").    Weight as of this encounter: 70.8 kg (156 lb 1.9 oz).           Does the patient have evidence of cognitive impairment? Yes          HEALTH RISK ASSESSMENT    Smoking Status:  Social History     Tobacco Use   Smoking Status Never    Passive exposure: Never   Smokeless Tobacco Never     Alcohol Consumption:  Social History     Substance and Sexual Activity   Alcohol Use No     Fall Risk Screen:    STEADI Fall Risk Assessment was completed, and patient is at HIGH risk for falls. Assessment completed " on:2023    Depression Screenin/29/2023     9:15 AM   PHQ-2/PHQ-9 Depression Screening   Little Interest or Pleasure in Doing Things 0-->not at all   Feeling Down, Depressed or Hopeless 0-->not at all   PHQ-9: Brief Depression Severity Measure Score 0       Health Habits and Functional and Cognitive Screenin/29/2023     9:15 AM   Functional & Cognitive Status   Do you have difficulty preparing food and eating? No   Do you have difficulty bathing yourself, getting dressed or grooming yourself? No   Do you have difficulty using the toilet? No   Do you have difficulty moving around from place to place? Yes   Do you have trouble with steps or getting out of a bed or a chair? No   Current Diet Unhealthy Diet   Dental Exam Up to date   Eye Exam Up to date   Exercise (times per week) 0 times per week   Current Exercises Include No Regular Exercise   Do you need help using the phone?  No   Are you deaf or do you have serious difficulty hearing?  No   Do you need help to go to places out of walking distance? Yes   Do you need help shopping? No   Do you need help preparing meals?  No   Do you need help with housework?  Yes   Do you need help with laundry? Yes   Do you need help taking your medications? Yes   Do you need help managing money? No   Do you ever drive or ride in a car without wearing a seat belt? No   Have you felt unusual stress, anger or loneliness in the last month? No   Who do you live with? Spouse   If you need help, do you have trouble finding someone available to you? No   Have you been bothered in the last four weeks by sexual problems? No   Do you have difficulty concentrating, remembering or making decisions? Yes       Age-appropriate Screening Schedule:  Refer to the list below for future screening recommendations based on patient's age, sex and/or medical conditions. Orders for these recommended tests are listed in the plan section. The patient has been provided with a written  plan.    Health Maintenance   Topic Date Due    LIPID PANEL  09/25/2021    COVID-19 Vaccine (5 - 2023-24 season) 09/01/2023    ANNUAL WELLNESS VISIT  10/21/2023    TDAP/TD VACCINES (2 - Td or Tdap) 12/17/2023    DXA SCAN  03/11/2024    BMI FOLLOWUP  09/14/2024    INFLUENZA VACCINE  Completed    Pneumococcal Vaccine 65+  Completed                  CMS Preventative Services Quick Reference  Risk Factors Identified During Encounter  Chronic Pain: OTC analgesics as needed. Proper dosing schedule discussed.   Polypharmacy: Medication List reviewed and Medications are appropriate for patient  The above risks/problems have been discussed with the patient.  Pertinent information has been shared with the patient in the After Visit Summary.  An After Visit Summary and PPPS were made available to the patient.    Follow Up:   Next Medicare Wellness visit to be scheduled in 1 year.       Additional E&M Note during same encounter follows:  Patient has multiple medical problems which are significant and separately identifiable that require additional work above and beyond the Medicare Wellness Visit.      Chief Complaint  Follow-up, Hypertension, and Foot Swelling (Left.)    Subjective        HPI  Sal Siu is also being seen today for increasing leg swelling.  She has been on amlodipine for better BP control.  Takes torsemide on a as needed basis however has been taking it daily now.  Has cut down on her salt intake.  Denies chest pain or shortness of breath    Review of Systems   Constitutional:  Negative for activity change, appetite change, fatigue and fever.   HENT:  Negative for congestion, ear discharge, ear pain and trouble swallowing.    Eyes:  Negative for photophobia and visual disturbance.   Respiratory:  Negative for cough and shortness of breath.    Cardiovascular:  Positive for leg swelling. Negative for chest pain and palpitations.   Gastrointestinal:  Negative for abdominal distention, constipation, diarrhea,  "nausea and vomiting.   Genitourinary:  Negative for dysuria, hematuria and urgency.   Musculoskeletal:  Positive for arthralgias. Negative for back pain, joint swelling and myalgias.   Skin:  Negative for color change and rash.   Neurological:  Negative for dizziness, weakness, light-headedness and confusion.   Hematological:  Negative for adenopathy. Does not bruise/bleed easily.   Psychiatric/Behavioral:  Negative for agitation and dysphoric mood. The patient is not nervous/anxious.        Objective   Vital Signs:  /68   Pulse 70   Temp 97.7 °F (36.5 °C)   Ht 157.5 cm (62.01\")   Wt 70.8 kg (156 lb 1.9 oz)   SpO2 98%   BMI 28.55 kg/m²     Physical Exam  Constitutional:       General: She is not in acute distress.     Appearance: She is well-developed.   HENT:      Nose: Nose normal.   Eyes:      General: No scleral icterus.     Conjunctiva/sclera: Conjunctivae normal.   Neck:      Thyroid: No thyromegaly.      Trachea: No tracheal deviation.   Cardiovascular:      Rate and Rhythm: Normal rate and regular rhythm.      Heart sounds: No murmur heard.     No friction rub.   Pulmonary:      Effort: No respiratory distress.      Breath sounds: No wheezing or rales.   Abdominal:      General: There is no distension.      Palpations: Abdomen is soft. There is no mass.      Tenderness: There is no abdominal tenderness. There is no guarding.   Musculoskeletal:         General: Deformity present. No swelling. Normal range of motion.      Right lower leg: Edema present.      Left lower leg: Edema present.   Lymphadenopathy:      Cervical: No cervical adenopathy.   Skin:     General: Skin is warm and dry.      Findings: No erythema or rash.   Neurological:      Mental Status: She is alert and oriented to person, place, and time.      Cranial Nerves: No cranial nerve deficit.      Coordination: Coordination normal.      Deep Tendon Reflexes: Reflexes are normal and symmetric.   Psychiatric:         Behavior: Behavior " normal.         Thought Content: Thought content normal.         Judgment: Judgment normal.                       Assessment and Plan   Diagnoses and all orders for this visit:    1. Benign essential HTN (Primary) has good control with current meds.  Will not reduce or discontinue amlodipine after discussing with patient    2. Mixed hyperlipidemia continue with the dietary restrictions and the statin    3. Leg swelling renal function okay may increase torsemide to 20 mg as needed continue with compression stockings and leg elevation             Follow Up   No follow-ups on file.  Patient was given instructions and counseling regarding her condition or for health maintenance advice. Please see specific information pulled into the AVS if appropriate.

## 2024-06-19 RX ORDER — TORSEMIDE 10 MG/1
20 TABLET ORAL DAILY PRN
Qty: 180 TABLET | Refills: 3 | Status: SHIPPED | OUTPATIENT
Start: 2024-06-19

## 2024-07-01 ENCOUNTER — OFFICE VISIT (OUTPATIENT)
Dept: CARDIOLOGY | Facility: CLINIC | Age: 89
End: 2024-07-01
Payer: MEDICARE

## 2024-07-01 ENCOUNTER — PATIENT ROUNDING (BHMG ONLY) (OUTPATIENT)
Dept: CARDIOLOGY | Facility: CLINIC | Age: 89
End: 2024-07-01
Payer: MEDICARE

## 2024-07-01 VITALS
HEART RATE: 71 BPM | HEIGHT: 62 IN | OXYGEN SATURATION: 97 % | BODY MASS INDEX: 28.74 KG/M2 | SYSTOLIC BLOOD PRESSURE: 132 MMHG | WEIGHT: 156.2 LBS | DIASTOLIC BLOOD PRESSURE: 62 MMHG

## 2024-07-01 DIAGNOSIS — E78.2 MIXED HYPERLIPIDEMIA: ICD-10-CM

## 2024-07-01 DIAGNOSIS — R06.09 DOE (DYSPNEA ON EXERTION): Primary | ICD-10-CM

## 2024-07-01 DIAGNOSIS — I10 PRIMARY HYPERTENSION: ICD-10-CM

## 2024-07-01 PROCEDURE — 99214 OFFICE O/P EST MOD 30 MIN: CPT | Performed by: INTERNAL MEDICINE

## 2024-07-01 RX ORDER — AMLODIPINE BESYLATE 10 MG/1
5 TABLET ORAL DAILY
Qty: 90 TABLET | Refills: 3 | Status: SHIPPED | OUTPATIENT
Start: 2024-07-01

## 2024-07-01 NOTE — PROGRESS NOTES
July 1, 2024    Hello, may I speak with Sal Siu?    My name is MYRIAM BA      I am  with [facility]  [address]  [place] [facility] [address] [phone].    Before we get started may I verify your date of birth? 1935    I am calling to officially welcome you to our practice and ask about your recent visit. Is this a good time to talk? yes    Tell me about your visit with us. What things went well?  EVERYTHING WENT WELL        We're always looking for ways to make our patients' experiences even better. Do you have recommendations on ways we may improve?  no    Overall were you satisfied with your first visit to our practice? yes       I appreciate you taking the time to speak with me today. Is there anything else I can do for you? no      Thank you, and have a great day.

## 2024-07-01 NOTE — PROGRESS NOTES
[facility] Cardiology  Office Progress Note  Sal Siu  1935  [address] [facility] [id number]       Visit Date: 07/01/24    PCP: David Barrios MD  [address]  [facility] [id number]    IDENTIFICATION: A 88 y.o. female  white female, retired /supervisor, from [place], [place].  Former [facility] pt 2020    PROBLEM LIST:   Chronic hypertension, probably essential.  Hypertensive cardiovascular disease:  2007 Acceptable quantitative SPECT gated Cardiolite GXT with reduced exercise capacity, acceptable LVEF (0.80)   9/2020 echocardiogram EF 71% AV sclerosis/MAC rvsp 35  Intermittent palpitations.   Dyslipidemia.   3/19 LDL: 53  9/20 LDL 47  Right infrascapular arthritis type pain with corticosteroid injection (data deficit), February 2009.  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  10. Remote operations:  Appendectomy, 1946.  Lower back apparent lipoma removal (data deficit).   Dilation and curettage, 1970.  Hysterectomy, 1971.  Lumbar disc surgery, 1997.  Laparoscopic cholecystectomy (data deficit).   Posterior capsular opacification, left eye, May 2018  8/21 ORIF Cervoni (screw placement) right hip fracture s/p fall.      CC:   Chief Complaint   Patient presents with    Hypertension    MOSELEY       Allergies  Allergies   Allergen Reactions    Sulfa Antibiotics Swelling     urticaria    Other Nausea And Vomiting     MUSHROOMS-CAUSE 'HALLUCINATIONS'       Current Medications    Current Outpatient Medications:     amLODIPine (NORVASC) 10 MG tablet, Take 1 tablet by mouth Daily., Disp: 90 tablet, Rfl: 3    atorvastatin (LIPITOR) 40 MG tablet, Take 1 tablet by mouth Daily., Disp: 90 tablet, Rfl: 3    Cholecalciferol (VITAMIN D3) 5000 units capsule capsule, Take 1 capsule by mouth Daily., Disp: 30 capsule, Rfl: 6    ciclopirox (LOPROX) 0.77 % cream, Apply  topically to the appropriate area as directed Every Night. Clean  "nail with alcohol for 7 days prior to application (Patient taking differently: Apply  topically to the appropriate area as directed Every Night. Clean nail with alcohol for 7 days prior to application As needed), Disp: 90 g, Rfl: 2    lisinopril (PRINIVIL,ZESTRIL) 40 MG tablet, Take 1 tablet by mouth Daily., Disp: 90 tablet, Rfl: 3    meclizine (ANTIVERT) 12.5 MG tablet, Take 1 tablet by mouth 3 (Three) Times a Day As Needed for Dizziness. (Patient taking differently: Take 1 tablet by mouth As Needed for Dizziness.), Disp: 30 tablet, Rfl: 0    torsemide (DEMADEX) 10 MG tablet, Take 2 tablets by mouth Daily As Needed (swelling, high BP)., Disp: 180 tablet, Rfl: 3    triamcinolone (KENALOG) 0.1 % ointment, Apply 1 application topically to the appropriate area as directed 2 (Two) Times a Day., Disp: 30 g, Rfl: 0    vitamin B-12 (CYANOCOBALAMIN) 500 MCG tablet, Take 1 tablet by mouth Daily., Disp: , Rfl:       History of Present Illness   Sal Siu is a 88 y.o. year old female here for follow up.    Some increased dyspnea and lower extremity edema.  She does feel that it may have increased with increase of amlodipine  OBJECTIVE:  Vitals:    07/01/24 1356   BP: 132/62   BP Location: Right arm   Patient Position: Sitting   Cuff Size: Adult   Pulse: 71   SpO2: 97%   Weight: 70.9 kg (156 lb 3.2 oz)   Height: 157.5 cm (62\")       Body mass index is 28.57 kg/m².    Constitutional:       Appearance: Healthy appearance. Not in distress.   Neck:      Vascular: No JVR. JVD normal.   Pulmonary:      Effort: Pulmonary effort is normal.      Breath sounds: Normal breath sounds. No wheezing. No rhonchi. No rales.   Chest:      Chest wall: Not tender to palpatation.   Cardiovascular:      PMI at left midclavicular line. Normal rate. Regular rhythm. Normal S1. Normal S2.       Murmurs: There is a systolic murmur.      No gallop.  No click. No rub.   Pulses:     Intact distal pulses.   Edema:     Peripheral edema " "present.  Abdominal:      General: Bowel sounds are normal.      Palpations: Abdomen is soft.      Tenderness: There is no abdominal tenderness.   Musculoskeletal: Normal range of motion.         General: No tenderness. Skin:     General: Skin is warm and dry.   Neurological:      General: No focal deficit present.      Mental Status: Alert and oriented to person, place and time.         Diagnostic Data:  Procedures      ASSESSMENT:   Diagnosis Plan   1. MOSELEY (dyspnea on exertion)  Adult Transthoracic Echo Complete W/ Cont if Necessary Per Protocol            PLAN:  Dyspnea multifactorial with valvular sclerosis we will follow-up echocardiogram for filling pressures are acceptable would need to change dose of amlodipine with typical \"dihydropyridine feet\"    Hypertension  controlled lisinopril amlodipine.  Will decrease amlodipine due to lower extremity edema    Dyslipidemia controlled on statin therapy          Arron Farias MD, FACC  "

## 2024-07-29 ENCOUNTER — OFFICE VISIT (OUTPATIENT)
Dept: INTERNAL MEDICINE | Facility: CLINIC | Age: 89
End: 2024-07-29
Payer: MEDICARE

## 2024-07-29 ENCOUNTER — TELEPHONE (OUTPATIENT)
Dept: INTERNAL MEDICINE | Facility: CLINIC | Age: 89
End: 2024-07-29

## 2024-07-29 VITALS
TEMPERATURE: 97.3 F | RESPIRATION RATE: 14 BRPM | OXYGEN SATURATION: 97 % | BODY MASS INDEX: 27.46 KG/M2 | DIASTOLIC BLOOD PRESSURE: 46 MMHG | HEIGHT: 63 IN | WEIGHT: 155 LBS | SYSTOLIC BLOOD PRESSURE: 144 MMHG | HEART RATE: 74 BPM

## 2024-07-29 DIAGNOSIS — E78.2 MIXED HYPERLIPIDEMIA: ICD-10-CM

## 2024-07-29 DIAGNOSIS — R26.9 GAIT DISTURBANCE: ICD-10-CM

## 2024-07-29 DIAGNOSIS — I10 BENIGN ESSENTIAL HTN: Primary | ICD-10-CM

## 2024-07-29 PROCEDURE — 99214 OFFICE O/P EST MOD 30 MIN: CPT | Performed by: INTERNAL MEDICINE

## 2024-07-29 PROCEDURE — 1159F MED LIST DOCD IN RCRD: CPT | Performed by: INTERNAL MEDICINE

## 2024-07-29 PROCEDURE — 1126F AMNT PAIN NOTED NONE PRSNT: CPT | Performed by: INTERNAL MEDICINE

## 2024-07-29 PROCEDURE — 1160F RVW MEDS BY RX/DR IN RCRD: CPT | Performed by: INTERNAL MEDICINE

## 2024-07-29 PROCEDURE — G2211 COMPLEX E/M VISIT ADD ON: HCPCS | Performed by: INTERNAL MEDICINE

## 2024-07-29 RX ORDER — ATORVASTATIN CALCIUM 40 MG/1
40 TABLET, FILM COATED ORAL DAILY
Qty: 90 TABLET | Refills: 3 | Status: SHIPPED | OUTPATIENT
Start: 2024-07-29

## 2024-07-29 NOTE — PROGRESS NOTES
"Subjective  Sal Siu is a 89 y.o. female    HPI coming in for follow-up patient with a longstanding history of hypertension has short-term memory deficit complaints of imbalance without ringing in her ears no headaches or visual disturbances she uses a walker intermittently.  She denies any loss of consciousness or localized weakness or numbness    The following portions of the patient's history were reviewed and updated as appropriate: allergies, current medications, past family history, past medical history, past social history, past surgical history, and problem list.     Review of Systems   Constitutional:  Positive for fatigue. Negative for activity change, appetite change and fever.   HENT:  Negative for congestion, ear discharge, ear pain and trouble swallowing.    Eyes:  Negative for photophobia and visual disturbance.   Respiratory:  Negative for cough and shortness of breath.    Cardiovascular:  Negative for chest pain and palpitations.   Gastrointestinal:  Negative for abdominal distention, abdominal pain, constipation, diarrhea, nausea and vomiting.   Endocrine: Negative.    Genitourinary:  Negative for dysuria, hematuria and urgency.   Musculoskeletal:  Positive for arthralgias and gait problem. Negative for back pain, joint swelling and myalgias.   Skin:  Negative for color change and rash.   Allergic/Immunologic: Negative.    Neurological:  Negative for dizziness, weakness, light-headedness and headaches.   Hematological:  Negative for adenopathy. Does not bruise/bleed easily.   Psychiatric/Behavioral:  Positive for sleep disturbance. Negative for agitation, confusion and dysphoric mood. The patient is not nervous/anxious.        Visit Vitals  /46   Pulse 74   Temp 97.3 °F (36.3 °C)   Resp 14   Ht 158.8 cm (62.5\")   Wt 70.3 kg (155 lb)   LMP  (LMP Unknown)   SpO2 97%   BMI 27.90 kg/m²       Objective  Physical Exam  Constitutional:       General: She is not in acute distress.     " Appearance: She is well-developed.   HENT:      Nose: Nose normal.   Eyes:      General: No scleral icterus.     Conjunctiva/sclera: Conjunctivae normal.   Neck:      Thyroid: No thyromegaly.      Trachea: No tracheal deviation.   Cardiovascular:      Rate and Rhythm: Normal rate and regular rhythm.      Heart sounds: No murmur heard.     No friction rub.   Pulmonary:      Effort: No respiratory distress.      Breath sounds: No wheezing or rales.   Abdominal:      General: There is no distension.      Palpations: Abdomen is soft. There is no mass.      Tenderness: There is no abdominal tenderness. There is no guarding.   Musculoskeletal:         General: Deformity present. Normal range of motion.   Lymphadenopathy:      Cervical: No cervical adenopathy.   Skin:     General: Skin is warm and dry.      Findings: No erythema or rash.   Neurological:      Mental Status: She is alert and oriented to person, place, and time.      Cranial Nerves: No cranial nerve deficit.      Coordination: Coordination normal.      Gait: Gait abnormal.      Deep Tendon Reflexes: Reflexes are normal and symmetric.   Psychiatric:         Behavior: Behavior normal.         Thought Content: Thought content normal.         Judgment: Judgment normal.       Diagnoses and all orders for this visit:    Benign essential HTN reasonably good control on amlodipine and lisinopril    Gait disturbance has a hesitant gait no localized weakness noted will benefit from physical therapy referral is being set up    Mixed hyperlipidemia continue with the dietary restrictions continue with the statins follow lipid profile

## 2024-07-29 NOTE — TELEPHONE ENCOUNTER
Rx Refill Note  Requested Prescriptions     Signed Prescriptions Disp Refills    atorvastatin (LIPITOR) 40 MG tablet 90 tablet 3     Sig: TAKE 1 TABLET BY MOUTH DAILY.     Authorizing Provider: [facility]     Ordering User: ASIA VERA      Last office visit with prescribing clinician: 12/29/2023   Last telemedicine visit with prescribing clinician: Visit date not found   Next office visit with prescribing clinician: 7/29/2024     Asia Vera MA  07/29/24, 13:05 EDT

## 2024-07-29 NOTE — TELEPHONE ENCOUNTER
PATIENT REQUEST REFERRAL FOR PT SENT TO [address] IN [facility]. PLEASE CALL MELODY'S DAUGHTER Rose Zacarias (Daughter)  [phone] FOR ANY FURTHER QUESTIONS. THANKS!

## 2024-07-31 ENCOUNTER — TELEPHONE (OUTPATIENT)
Dept: CARDIOLOGY | Facility: CLINIC | Age: 89
End: 2024-07-31

## 2024-07-31 ENCOUNTER — HOSPITAL ENCOUNTER (OUTPATIENT)
Dept: CARDIOLOGY | Facility: HOSPITAL | Age: 89
Discharge: HOME | End: 2024-07-31
Admitting: INTERNAL MEDICINE
Payer: MEDICARE

## 2024-07-31 VITALS
SYSTOLIC BLOOD PRESSURE: 167 MMHG | HEIGHT: 63 IN | DIASTOLIC BLOOD PRESSURE: 91 MMHG | BODY MASS INDEX: 27.46 KG/M2 | WEIGHT: 154.98 LBS

## 2024-07-31 DIAGNOSIS — R06.09 DOE (DYSPNEA ON EXERTION): ICD-10-CM

## 2024-07-31 LAB
BH CV ECHO MEAS - AO MAX PG: 25 MMHG
BH CV ECHO MEAS - AO MEAN PG: 14 MMHG
BH CV ECHO MEAS - AO ROOT DIAM: 3 CM
BH CV ECHO MEAS - AO V2 MAX: 250 CM/SEC
BH CV ECHO MEAS - AO V2 VTI: 52.5 CM
BH CV ECHO MEAS - AVA(I,D): 1.89 CM2
BH CV ECHO MEAS - EDV(CUBED): 79.5 ML
BH CV ECHO MEAS - EDV(MOD-SP2): 55 ML
BH CV ECHO MEAS - EDV(MOD-SP4): 58.2 ML
BH CV ECHO MEAS - EF(MOD-BP): 72.1 %
BH CV ECHO MEAS - EF(MOD-SP2): 66.5 %
BH CV ECHO MEAS - EF(MOD-SP4): 79 %
BH CV ECHO MEAS - ESV(CUBED): 9.5 ML
BH CV ECHO MEAS - ESV(MOD-SP2): 18.4 ML
BH CV ECHO MEAS - ESV(MOD-SP4): 12.2 ML
BH CV ECHO MEAS - FS: 50.8 %
BH CV ECHO MEAS - IVS/LVPW: 1 CM
BH CV ECHO MEAS - IVSD: 1.1 CM
BH CV ECHO MEAS - LA DIMENSION: 3.4 CM
BH CV ECHO MEAS - LAT PEAK E' VEL: 6.3 CM/SEC
BH CV ECHO MEAS - LV DIASTOLIC VOL/BSA (35-75): 34 CM2
BH CV ECHO MEAS - LV MASS(C)D: 162.9 GRAMS
BH CV ECHO MEAS - LV MAX PG: 13.9 MMHG
BH CV ECHO MEAS - LV MEAN PG: 7.5 MMHG
BH CV ECHO MEAS - LV SYSTOLIC VOL/BSA (12-30): 7.1 CM2
BH CV ECHO MEAS - LV V1 MAX: 186 CM/SEC
BH CV ECHO MEAS - LV V1 VTI: 37.4 CM
BH CV ECHO MEAS - LVIDD: 4.3 CM
BH CV ECHO MEAS - LVIDS: 2.11 CM
BH CV ECHO MEAS - LVOT AREA: 2.7 CM2
BH CV ECHO MEAS - LVOT DIAM: 1.84 CM
BH CV ECHO MEAS - LVPWD: 1.1 CM
BH CV ECHO MEAS - MED PEAK E' VEL: 4.8 CM/SEC
BH CV ECHO MEAS - MR MAX PG: 67.7 MMHG
BH CV ECHO MEAS - MR MAX VEL: 411.5 CM/SEC
BH CV ECHO MEAS - MV A MAX VEL: 151 CM/SEC
BH CV ECHO MEAS - MV DEC SLOPE: 388.1 CM/SEC2
BH CV ECHO MEAS - MV DEC TIME: 0.25 SEC
BH CV ECHO MEAS - MV E MAX VEL: 105 CM/SEC
BH CV ECHO MEAS - MV E/A: 0.7
BH CV ECHO MEAS - MV MAX PG: 13.3 MMHG
BH CV ECHO MEAS - MV MEAN PG: 4 MMHG
BH CV ECHO MEAS - MV P1/2T: 94.4 MSEC
BH CV ECHO MEAS - MV V2 VTI: 50.2 CM
BH CV ECHO MEAS - MVA(P1/2T): 2.33 CM2
BH CV ECHO MEAS - MVA(VTI): 1.98 CM2
BH CV ECHO MEAS - PA ACC TIME: 0.11 SEC
BH CV ECHO MEAS - PA V2 MAX: 103.2 CM/SEC
BH CV ECHO MEAS - PI END-D VEL: 57 CM/SEC
BH CV ECHO MEAS - RAP SYSTOLE: 8 MMHG
BH CV ECHO MEAS - RVSP: 47 MMHG
BH CV ECHO MEAS - SV(LVOT): 99.5 ML
BH CV ECHO MEAS - SV(MOD-SP2): 36.6 ML
BH CV ECHO MEAS - SV(MOD-SP4): 46 ML
BH CV ECHO MEAS - SVI(LVOT): 58.1 ML/M2
BH CV ECHO MEAS - SVI(MOD-SP2): 21.4 ML/M2
BH CV ECHO MEAS - SVI(MOD-SP4): 26.9 ML/M2
BH CV ECHO MEAS - TAPSE (>1.6): 2.15 CM
BH CV ECHO MEAS - TR MAX PG: 39 MMHG
BH CV ECHO MEAS - TR MAX VEL: 281.5 CM/SEC
BH CV ECHO MEASUREMENTS AVERAGE E/E' RATIO: 18.92
BH CV XLRA - RV BASE: 3.2 CM
BH CV XLRA - RV LENGTH: 6.1 CM
BH CV XLRA - RV MID: 2.5 CM
BH CV XLRA - TDI S': 16.5 CM/SEC
LEFT ATRIUM VOLUME INDEX: 33.2 ML/M2

## 2024-07-31 PROCEDURE — 93306 TTE W/DOPPLER COMPLETE: CPT

## 2024-07-31 NOTE — TELEPHONE ENCOUNTER
Arron Farias MD Jackson, Kristina, JERMAINE  Echo normal LVEF  Mild aortic stenosis  Mild elevation of right heart pressure    Echo in 2 years    Pt called and given the above results, spoke with POA  also to provide her with the same results, verbalized understanding .

## 2024-09-18 ENCOUNTER — TELEPHONE (OUTPATIENT)
Dept: CARDIOLOGY | Facility: CLINIC | Age: 89
End: 2024-09-18
Payer: MEDICARE

## 2024-10-08 ENCOUNTER — FLU SHOT (OUTPATIENT)
Dept: INTERNAL MEDICINE | Facility: CLINIC | Age: 89
End: 2024-10-08
Payer: MEDICARE

## 2024-10-08 DIAGNOSIS — Z23 NEED FOR INFLUENZA VACCINATION: Primary | ICD-10-CM

## 2024-10-08 PROCEDURE — 90662 IIV NO PRSV INCREASED AG IM: CPT | Performed by: INTERNAL MEDICINE

## 2024-10-08 PROCEDURE — G0008 ADMIN INFLUENZA VIRUS VAC: HCPCS | Performed by: INTERNAL MEDICINE

## 2024-10-14 RX ORDER — LISINOPRIL 40 MG/1
40 TABLET ORAL DAILY
Qty: 90 TABLET | Refills: 3 | Status: SHIPPED | OUTPATIENT
Start: 2024-10-14

## 2024-11-11 RX ORDER — ATORVASTATIN CALCIUM 40 MG/1
40 TABLET, FILM COATED ORAL DAILY
Qty: 90 TABLET | Refills: 3 | OUTPATIENT
Start: 2024-11-11

## 2024-11-11 RX ORDER — AMLODIPINE BESYLATE 10 MG/1
10 TABLET ORAL DAILY
Qty: 90 TABLET | Refills: 0 | Status: SHIPPED | OUTPATIENT
Start: 2024-11-11

## 2024-11-19 RX ORDER — ATORVASTATIN CALCIUM 40 MG/1
40 TABLET, FILM COATED ORAL DAILY
Qty: 90 TABLET | Refills: 3 | OUTPATIENT
Start: 2024-11-19

## 2024-11-25 RX ORDER — ATORVASTATIN CALCIUM 40 MG/1
40 TABLET, FILM COATED ORAL DAILY
Qty: 90 TABLET | Refills: 3 | OUTPATIENT
Start: 2024-11-25

## 2024-11-25 RX ORDER — ATORVASTATIN CALCIUM 40 MG/1
40 TABLET, FILM COATED ORAL DAILY
Qty: 90 TABLET | Refills: 3 | Status: SHIPPED | OUTPATIENT
Start: 2024-11-25

## 2025-01-02 ENCOUNTER — OFFICE VISIT (OUTPATIENT)
Dept: INTERNAL MEDICINE | Facility: CLINIC | Age: OVER 89
End: 2025-01-02
Payer: MEDICARE

## 2025-01-02 VITALS
RESPIRATION RATE: 16 BRPM | WEIGHT: 153 LBS | TEMPERATURE: 98.2 F | HEIGHT: 63 IN | BODY MASS INDEX: 27.11 KG/M2 | OXYGEN SATURATION: 97 % | HEART RATE: 75 BPM | SYSTOLIC BLOOD PRESSURE: 128 MMHG | DIASTOLIC BLOOD PRESSURE: 71 MMHG

## 2025-01-02 DIAGNOSIS — F02.B0 MODERATE LATE ONSET ALZHEIMER'S DEMENTIA WITHOUT BEHAVIORAL DISTURBANCE, PSYCHOTIC DISTURBANCE, MOOD DISTURBANCE, OR ANXIETY: ICD-10-CM

## 2025-01-02 DIAGNOSIS — I10 BENIGN ESSENTIAL HTN: Primary | ICD-10-CM

## 2025-01-02 DIAGNOSIS — G30.1 MODERATE LATE ONSET ALZHEIMER'S DEMENTIA WITHOUT BEHAVIORAL DISTURBANCE, PSYCHOTIC DISTURBANCE, MOOD DISTURBANCE, OR ANXIETY: ICD-10-CM

## 2025-01-02 DIAGNOSIS — E78.2 MIXED HYPERLIPIDEMIA: ICD-10-CM

## 2025-01-02 PROBLEM — L72.3 SEBACEOUS CYST: Status: RESOLVED | Noted: 2022-05-11 | Resolved: 2025-01-02

## 2025-01-02 PROCEDURE — G0439 PPPS, SUBSEQ VISIT: HCPCS | Performed by: INTERNAL MEDICINE

## 2025-01-02 PROCEDURE — 1170F FXNL STATUS ASSESSED: CPT | Performed by: INTERNAL MEDICINE

## 2025-01-02 PROCEDURE — 1160F RVW MEDS BY RX/DR IN RCRD: CPT | Performed by: INTERNAL MEDICINE

## 2025-01-02 PROCEDURE — 1159F MED LIST DOCD IN RCRD: CPT | Performed by: INTERNAL MEDICINE

## 2025-01-02 PROCEDURE — 99213 OFFICE O/P EST LOW 20 MIN: CPT | Performed by: INTERNAL MEDICINE

## 2025-01-02 PROCEDURE — 1126F AMNT PAIN NOTED NONE PRSNT: CPT | Performed by: INTERNAL MEDICINE

## 2025-01-02 NOTE — PROGRESS NOTES
The ABCs of the Annual Wellness Visit  Subsequent Medicare Wellness Visit    Subjective      Sal Siu is a 89 y.o. female who presents for a Subsequent Medicare Wellness Visit.  Also has increasing short-term memory deficit she is accompanied by her neighbor who is giving us some of the history because the patient is a poor historian from her memory loss.  She is reasonably compliant with her meds now because she gets a pill pack.  She is behaviorally stable denies significant sleep disturbances  Review of Systems   Constitutional: Negative.  Negative for activity change, appetite change, fatigue and fever.   HENT:  Negative for congestion, ear discharge, ear pain and trouble swallowing.    Eyes:  Negative for photophobia and visual disturbance.   Respiratory:  Negative for cough and shortness of breath.    Cardiovascular:  Negative for chest pain and palpitations.   Gastrointestinal:  Negative for abdominal distention, abdominal pain, constipation, diarrhea, nausea and vomiting.   Endocrine: Negative.    Genitourinary:  Negative for dysuria, hematuria and urgency.   Musculoskeletal:  Positive for arthralgias. Negative for back pain, joint swelling and myalgias.   Skin:  Negative for color change and rash.   Allergic/Immunologic: Negative.    Neurological:  Negative for dizziness, weakness, light-headedness and headaches.   Hematological:  Negative for adenopathy. Does not bruise/bleed easily.   Psychiatric/Behavioral:  Positive for dysphoric mood and sleep disturbance. Negative for agitation and confusion. The patient is not nervous/anxious.         The following portions of the patient's history were reviewed and   updated as appropriate: allergies, current medications, past family history, past medical history, past social history, past surgical history, and problem list.    Compared to one year ago, the patient feels her physical   health is the same.    Compared to one year ago, the patient feels  her mental   health is the same.    Recent Hospitalizations:  She was not admitted to the hospital during the last year.       Current Medical Providers:  Patient Care Team:  David Barrios MD as PCP - General (Internal Medicine)  Arron Farias MD as Consulting Physician (Cardiology)  Olivier Andrade PA-C as Physician Assistant (Physician Assistant)  Jeyson Quinteros, JESSICA (Optometry)    Outpatient Medications Prior to Visit   Medication Sig Dispense Refill    amLODIPine (NORVASC) 10 MG tablet Take 1 tablet by mouth Daily. PT NEEDS UPDATED LAB WORK FOR FUTURE REFILLS PLS 90 tablet 0    atorvastatin (LIPITOR) 40 MG tablet Take 1 tablet by mouth Daily. 90 tablet 3    Cholecalciferol (VITAMIN D3) 5000 units capsule capsule Take 1 capsule by mouth Daily. 30 capsule 6    lisinopril (PRINIVIL,ZESTRIL) 40 MG tablet TAKE 1 TABLET BY MOUTH DAILY. 90 tablet 3    torsemide (DEMADEX) 10 MG tablet Take 2 tablets by mouth Daily As Needed (swelling, high BP). 180 tablet 3    vitamin B-12 (CYANOCOBALAMIN) 500 MCG tablet Take 1 tablet by mouth Daily.      meclizine (ANTIVERT) 12.5 MG tablet Take 1 tablet by mouth 3 (Three) Times a Day As Needed for Dizziness. (Patient not taking: Reported on 1/2/2025) 30 tablet 0     No facility-administered medications prior to visit.       No opioid medication identified on active medication list. I have reviewed chart for other potential  high risk medication/s and harmful drug interactions in the elderly.        Aspirin is not on active medication list.  Aspirin use is not indicated based on review of current medical condition/s. Risk of harm outweighs potential benefits.  .    Patient Active Problem List   Diagnosis    Benign essential HTN    Mixed hyperlipidemia    Vertigo     Advance Care Planning  Advance Directive is not on file.  ACP discussion was held with the patient during this visit. Patient does not have an advance directive, declines further assistance.     Objective   "  Vitals:    01/02/25 1410   BP: 128/71   Pulse: 75   Resp: 16   Temp: 98.2 °F (36.8 °C)   TempSrc: Infrared   SpO2: 97%   Weight: 69.4 kg (153 lb)   Height: 158.8 cm (62.52\")   PainSc: 0-No pain     Estimated body mass index is 27.52 kg/m² as calculated from the following:    Height as of this encounter: 158.8 cm (62.52\").    Weight as of this encounter: 69.4 kg (153 lb).    Physical Exam  Constitutional:       General: She is not in acute distress.     Appearance: She is well-developed.   HENT:      Nose: Nose normal.   Eyes:      General: No scleral icterus.     Conjunctiva/sclera: Conjunctivae normal.   Neck:      Thyroid: No thyromegaly.      Trachea: No tracheal deviation.   Cardiovascular:      Rate and Rhythm: Normal rate and regular rhythm.      Heart sounds: No murmur heard.     No friction rub.   Pulmonary:      Effort: No respiratory distress.      Breath sounds: No wheezing or rales.   Abdominal:      General: There is no distension.      Palpations: Abdomen is soft. There is no mass.      Tenderness: There is no abdominal tenderness. There is no guarding.   Musculoskeletal:         General: Deformity present. Normal range of motion.   Lymphadenopathy:      Cervical: No cervical adenopathy.   Skin:     General: Skin is warm and dry.      Findings: No erythema or rash.   Neurological:      Mental Status: She is alert and oriented to person, place, and time.      Cranial Nerves: No cranial nerve deficit.      Coordination: Coordination normal.      Deep Tendon Reflexes: Reflexes are normal and symmetric.   Psychiatric:         Behavior: Behavior normal.         Thought Content: Thought content normal.                Does the patient have evidence of cognitive impairment?   Yes: behaviorally stable            HEALTH RISK ASSESSMENT    Smoking Status:  Social History     Tobacco Use   Smoking Status Never    Passive exposure: Never   Smokeless Tobacco Never     Alcohol Consumption:  Social History "     Substance and Sexual Activity   Alcohol Use No     Fall Risk Screen:    SUZETTEADI Fall Risk Assessment was completed, and patient is at MODERATE risk for falls. Assessment completed on:2025    Depression Screenin/2/2025     2:09 PM   PHQ-2/PHQ-9 Depression Screening   Little interest or pleasure in doing things Not at all   Feeling down, depressed, or hopeless Not at all   How difficult have these problems made it for you to do your work, take care of things at home, or get along with other people? Not difficult at all       Health Habits and Functional and Cognitive Screenin/2/2025     2:10 PM   Functional & Cognitive Status   Do you have difficulty preparing food and eating? No   Do you have difficulty bathing yourself, getting dressed or grooming yourself? No   Do you have difficulty using the toilet? No   Do you have difficulty moving around from place to place? No   Do you have trouble with steps or getting out of a bed or a chair? No   Current Diet Frequent Junk Food   Dental Exam Up to date   Eye Exam Up to date   Exercise (times per week) 0 times per week   Current Exercises Include No Regular Exercise   Do you need help using the phone?  No   Are you deaf or do you have serious difficulty hearing?  No   Do you need help to go to places out of walking distance? No   Do you need help shopping? No   Do you need help preparing meals?  No   Do you need help with housework?  No   Do you need help with laundry? No   Do you need help taking your medications? No   Do you need help managing money? No   Do you ever drive or ride in a car without wearing a seat belt? No   Have you felt unusual stress, anger or loneliness in the last month? No   Who do you live with? Spouse   If you need help, do you have trouble finding someone available to you? No   Have you been bothered in the last four weeks by sexual problems? No   Do you have difficulty concentrating, remembering or making decisions? No        Age-appropriate Screening Schedule:  Refer to the list below for future screening recommendations based on patient's age, sex and/or medical conditions. Orders for these recommended tests are listed in the plan section. The patient has been provided with a written plan.    Health Maintenance   Topic Date Due    LIPID PANEL  09/25/2021    TDAP/TD VACCINES (2 - Td or Tdap) 12/17/2023    DXA SCAN  03/11/2024    ANNUAL WELLNESS VISIT  12/29/2024    RSV Vaccine - Adults (1 - 1-dose 75+ series) 07/29/2025 (Originally 7/12/2010)    BMI FOLLOWUP  07/29/2025    COVID-19 Vaccine  Completed    INFLUENZA VACCINE  Completed    Pneumococcal Vaccine 65+  Completed                CMS Preventative Services Quick Reference  Risk Factors Identified During Encounter:    Polypharmacy: Medication List reviewed and Medications are appropriate for patient    The above risks/problems have been discussed with the patient.  Pertinent information has been shared with the patient in the After Visit Summary.     Diagnoses and all orders for this visit:    1. Benign essential HTN (Primary) stable on amlodipine and lisinopril discussed low-sodium diet    2. Mixed hyperlipidemia continue with the dietary restrictions and the statins follow lipid profile    3. Moderate late onset Alzheimer's dementia without behavioral disturbance, psychotic disturbance, mood disturbance, or anxiety behaviorally stable.  CT head done about 2 years ago without any acute intracranial abnormality.  Evidence of chronic small vessel ischemic changes noted.  Consider donepezil.  History of B12 deficiency on supplement        Follow Up:   Next Medicare Wellness visit to be scheduled in 1 year.      An After Visit Summary and PPPS were made available to the patient.

## 2025-01-03 LAB
ALBUMIN SERPL-MCNC: 4.3 G/DL (ref 3.5–5.2)
ALBUMIN/GLOB SERPL: 1.6 G/DL
ALP SERPL-CCNC: 112 U/L (ref 39–117)
ALT SERPL-CCNC: 17 U/L (ref 1–33)
AST SERPL-CCNC: 11 U/L (ref 1–32)
BILIRUB SERPL-MCNC: 0.8 MG/DL (ref 0–1.2)
BUN SERPL-MCNC: 18 MG/DL (ref 8–23)
BUN/CREAT SERPL: 19.1 (ref 7–25)
CALCIUM SERPL-MCNC: 9.7 MG/DL (ref 8.6–10.5)
CHLORIDE SERPL-SCNC: 105 MMOL/L (ref 98–107)
CO2 SERPL-SCNC: 26.7 MMOL/L (ref 22–29)
CREAT SERPL-MCNC: 0.94 MG/DL (ref 0.57–1)
EGFRCR SERPLBLD CKD-EPI 2021: 58.1 ML/MIN/1.73
ERYTHROCYTE [DISTWIDTH] IN BLOOD BY AUTOMATED COUNT: 11.6 % (ref 12.3–15.4)
GLOBULIN SER CALC-MCNC: 2.7 GM/DL
GLUCOSE SERPL-MCNC: 114 MG/DL (ref 65–99)
HCT VFR BLD AUTO: 37.4 % (ref 34–46.6)
HGB BLD-MCNC: 12.9 G/DL (ref 12–15.9)
MCH RBC QN AUTO: 30.9 PG (ref 26.6–33)
MCHC RBC AUTO-ENTMCNC: 34.5 G/DL (ref 31.5–35.7)
MCV RBC AUTO: 89.7 FL (ref 79–97)
PLATELET # BLD AUTO: 237 10*3/MM3 (ref 140–450)
POTASSIUM SERPL-SCNC: 4 MMOL/L (ref 3.5–5.2)
PROT SERPL-MCNC: 7 G/DL (ref 6–8.5)
RBC # BLD AUTO: 4.17 10*6/MM3 (ref 3.77–5.28)
SODIUM SERPL-SCNC: 142 MMOL/L (ref 136–145)
WBC # BLD AUTO: 9.54 10*3/MM3 (ref 3.4–10.8)

## 2025-04-01 ENCOUNTER — TELEPHONE (OUTPATIENT)
Dept: INTERNAL MEDICINE | Facility: CLINIC | Age: OVER 89
End: 2025-04-01
Payer: MEDICARE

## 2025-04-01 NOTE — TELEPHONE ENCOUNTER
Patients daughter stopped by the office requesting an appointment for her mom on Thursday 04/03/25. PCP doesn't have anything open until Monday 04/07 but daughter will be out of town and patient only wants to see PCP.     Patients has tore off fingernail and having a lot of pain with it. Daughter is worried it may be infected. Please advise.

## 2025-04-03 ENCOUNTER — OFFICE VISIT (OUTPATIENT)
Dept: INTERNAL MEDICINE | Facility: CLINIC | Age: OVER 89
End: 2025-04-03
Payer: MEDICARE

## 2025-04-03 VITALS
WEIGHT: 153 LBS | TEMPERATURE: 97.7 F | BODY MASS INDEX: 28.16 KG/M2 | OXYGEN SATURATION: 96 % | DIASTOLIC BLOOD PRESSURE: 70 MMHG | RESPIRATION RATE: 16 BRPM | HEIGHT: 62 IN | SYSTOLIC BLOOD PRESSURE: 124 MMHG | HEART RATE: 72 BPM

## 2025-04-03 DIAGNOSIS — B35.1 ONYCHOMYCOSIS: Primary | ICD-10-CM

## 2025-04-03 RX ORDER — TERBINAFINE HYDROCHLORIDE 250 MG/1
250 TABLET ORAL DAILY
Qty: 45 TABLET | Refills: 0 | Status: SHIPPED | OUTPATIENT
Start: 2025-04-03

## 2025-04-03 NOTE — PROGRESS NOTES
"Subjective  Sal Siu is a 89 y.o. female    HPI patient with a history of left third and fourth finger onychomycosis comes in with partial detachment with her middle finger nail.  Does not have any severe pain or active bleeding.  No new trauma    The following portions of the patient's history were reviewed and updated as appropriate: allergies, current medications, past family history, past medical history, past social history, past surgical history, and problem list.     Review of Systems   Constitutional: Negative.  Negative for activity change, appetite change, fatigue and fever.   HENT:  Negative for congestion, ear discharge, ear pain and trouble swallowing.    Eyes:  Negative for photophobia and visual disturbance.   Respiratory:  Negative for cough and shortness of breath.    Cardiovascular:  Negative for chest pain and palpitations.   Gastrointestinal:  Negative for abdominal distention, abdominal pain, constipation, diarrhea, nausea and vomiting.   Endocrine: Negative.    Genitourinary:  Negative for dysuria, hematuria and urgency.   Musculoskeletal:  Positive for arthralgias. Negative for back pain, joint swelling and myalgias.   Skin:  Positive for rash. Negative for color change.   Allergic/Immunologic: Negative.    Neurological:  Negative for dizziness, weakness, light-headedness and headaches.   Hematological:  Negative for adenopathy. Does not bruise/bleed easily.   Psychiatric/Behavioral:  Negative for agitation, confusion and dysphoric mood. The patient is not nervous/anxious.        Visit Vitals  /70   Pulse 72   Temp 97.7 °F (36.5 °C)   Resp 16   Ht 157.5 cm (62\")   Wt 69.4 kg (153 lb)   LMP  (LMP Unknown)   SpO2 96%   BMI 27.98 kg/m²       Objective  Physical Exam  Constitutional:       General: She is not in acute distress.     Appearance: She is well-developed.   HENT:      Nose: Nose normal.   Eyes:      General: No scleral icterus.     Conjunctiva/sclera: Conjunctivae " normal.   Neck:      Thyroid: No thyromegaly.      Trachea: No tracheal deviation.   Cardiovascular:      Rate and Rhythm: Normal rate and regular rhythm.      Heart sounds: No murmur heard.     No friction rub.   Pulmonary:      Effort: No respiratory distress.      Breath sounds: No wheezing or rales.   Abdominal:      General: There is no distension.      Palpations: Abdomen is soft. There is no mass.      Tenderness: There is no abdominal tenderness. There is no guarding.   Musculoskeletal:         General: No deformity. Normal range of motion.        Arms:       Comments: Partial avulsion of the middle finger nail with onychomycosis noted on middle and ring finger   Lymphadenopathy:      Cervical: No cervical adenopathy.   Skin:     General: Skin is warm and dry.      Findings: No erythema or rash.   Neurological:      Mental Status: She is alert and oriented to person, place, and time.      Cranial Nerves: No cranial nerve deficit.      Coordination: Coordination normal.      Deep Tendon Reflexes: Reflexes are normal and symmetric.   Psychiatric:         Behavior: Behavior normal.         Thought Content: Thought content normal.         Judgment: Judgment normal.         Diagnoses and all orders for this visit:    Onychomycosis discussed treatment with Lamisil for 6 weeks she is agreeable with this.  Discussed potential side effects.  Partially avulsed nail removed without much difficulty discussed wound care.    Other orders  -     terbinafine (lamiSIL) 250 MG tablet; Take 1 tablet by mouth Daily.

## 2025-05-16 RX ORDER — AMLODIPINE BESYLATE 10 MG/1
10 TABLET ORAL DAILY
Qty: 90 TABLET | Refills: 0 | Status: SHIPPED | OUTPATIENT
Start: 2025-05-16

## 2025-05-16 NOTE — TELEPHONE ENCOUNTER
Lab Results   Component Value Date    GLUCOSE 114 (H) 01/02/2025    BUN 18 01/02/2025    CREATININE 0.94 01/02/2025     01/02/2025    K 4.0 01/02/2025     01/02/2025    CALCIUM 9.7 01/02/2025    PROTEINTOT 7.0 01/02/2025    ALBUMIN 4.3 01/02/2025    ALT 17 01/02/2025    AST 11 01/02/2025    ALKPHOS 112 01/02/2025    BILITOT 0.8 01/02/2025    GLOB 2.7 01/02/2025    AGRATIO 1.6 01/02/2025    BCR 19.1 01/02/2025    ANIONGAP 10.6 10/09/2022    EGFR 58.1 (L) 01/02/2025

## 2025-05-17 RX ORDER — TERBINAFINE HYDROCHLORIDE 250 MG/1
250 TABLET ORAL DAILY
Qty: 90 TABLET | Refills: 3 | Status: SHIPPED | OUTPATIENT
Start: 2025-05-17

## 2025-06-27 RX ORDER — TORSEMIDE 10 MG/1
20 TABLET ORAL DAILY PRN
Qty: 180 TABLET | Refills: 3 | Status: SHIPPED | OUTPATIENT
Start: 2025-06-27

## 2025-07-03 ENCOUNTER — OFFICE VISIT (OUTPATIENT)
Dept: INTERNAL MEDICINE | Facility: CLINIC | Age: OVER 89
End: 2025-07-03
Payer: MEDICARE

## 2025-07-03 VITALS
BODY MASS INDEX: 27.6 KG/M2 | DIASTOLIC BLOOD PRESSURE: 60 MMHG | HEIGHT: 62 IN | WEIGHT: 150 LBS | SYSTOLIC BLOOD PRESSURE: 138 MMHG | HEART RATE: 74 BPM | OXYGEN SATURATION: 96 % | RESPIRATION RATE: 16 BRPM | TEMPERATURE: 97.5 F

## 2025-07-03 DIAGNOSIS — R13.12 OROPHARYNGEAL DYSPHAGIA: ICD-10-CM

## 2025-07-03 DIAGNOSIS — I10 BENIGN ESSENTIAL HTN: Primary | ICD-10-CM

## 2025-07-03 DIAGNOSIS — E78.2 MIXED HYPERLIPIDEMIA: ICD-10-CM

## 2025-07-03 NOTE — PROGRESS NOTES
"Subjective  Sal Siu is a 89 y.o. female    Hypertension  Associated symptoms: no chest pain, no headaches, no palpitations, no shortness of breath and no dizziness    Coming in for follow-up patient with hypertension dyslipidemia for about 2 weeks has been complaining of some trouble swallowing certain foods.  This is intermittent in nature.  She says she uses a potato chip for crunchy food to help with swallowing if needed.  Symptoms not associated with liquids.  Denies heartburn indigestion.  No loss of appetite  The following portions of the patient's history were reviewed and updated as appropriate: allergies, current medications, past family history, past medical history, past social history, past surgical history, and problem list.     Review of Systems   Constitutional: Negative.  Negative for activity change, appetite change, fatigue and fever.   HENT:  Positive for trouble swallowing. Negative for congestion, ear discharge and ear pain.    Eyes:  Negative for photophobia and visual disturbance.   Respiratory:  Negative for cough and shortness of breath.    Cardiovascular:  Negative for chest pain and palpitations.   Gastrointestinal:  Negative for abdominal distention, abdominal pain, constipation, diarrhea, nausea and vomiting.   Endocrine: Negative.    Genitourinary:  Negative for dysuria, hematuria and urgency.   Musculoskeletal:  Positive for arthralgias. Negative for back pain, joint swelling and myalgias.   Skin:  Negative for color change and rash.   Allergic/Immunologic: Negative.    Neurological:  Negative for dizziness, weakness, light-headedness and headaches.   Hematological:  Negative for adenopathy. Does not bruise/bleed easily.   Psychiatric/Behavioral:  Negative for agitation, confusion and dysphoric mood. The patient is not nervous/anxious.        Visit Vitals  /60   Pulse 74   Temp 97.5 °F (36.4 °C)   Resp 16   Ht 157.5 cm (62\")   Wt 68 kg (150 lb)   LMP  (LMP " Unknown)   SpO2 96%   BMI 27.44 kg/m²       Objective  Physical Exam  Constitutional:       General: She is not in acute distress.     Appearance: She is well-developed.   HENT:      Nose: Nose normal.   Eyes:      General: No scleral icterus.     Conjunctiva/sclera: Conjunctivae normal.   Neck:      Thyroid: No thyromegaly.      Trachea: No tracheal deviation.   Cardiovascular:      Rate and Rhythm: Normal rate and regular rhythm.      Heart sounds: No murmur heard.     No friction rub.   Pulmonary:      Effort: No respiratory distress.      Breath sounds: No wheezing or rales.   Abdominal:      General: There is no distension.      Palpations: Abdomen is soft. There is no mass.      Tenderness: There is no abdominal tenderness. There is no guarding.   Musculoskeletal:         General: Deformity present. Normal range of motion.   Lymphadenopathy:      Cervical: No cervical adenopathy.   Skin:     General: Skin is warm and dry.      Findings: No erythema or rash.   Neurological:      Mental Status: She is alert and oriented to person, place, and time.      Cranial Nerves: No cranial nerve deficit.      Coordination: Coordination normal.      Deep Tendon Reflexes: Reflexes are normal and symmetric.   Psychiatric:         Behavior: Behavior normal.         Thought Content: Thought content normal.         Judgment: Judgment normal.       The ASCVD Risk score (Jane DK, et al., 2019) failed to calculate for the following reasons:    The 2019 ASCVD risk score is only valid for ages 40 to 79     Diagnoses and all orders for this visit:    Benign essential HTN stable continue with current meds and low-salt diet    Mixed hyperlipidemia continue with the dietary restrictions and the statins follow lipid profile    Oropharyngeal dysphagia exam unremarkable.  Will follow for now discussed proper swallowing techniques with chin tuck.  If still not better will need endoscopic evaluation

## 2025-07-14 RX ORDER — LISINOPRIL 40 MG/1
40 TABLET ORAL DAILY
Qty: 90 TABLET | Refills: 3 | Status: SHIPPED | OUTPATIENT
Start: 2025-07-14

## 2025-08-11 RX ORDER — ATORVASTATIN CALCIUM 40 MG/1
40 TABLET, FILM COATED ORAL DAILY
Qty: 90 TABLET | Refills: 3 | Status: SHIPPED | OUTPATIENT
Start: 2025-08-11

## 2025-08-22 RX ORDER — AMLODIPINE BESYLATE 10 MG/1
10 TABLET ORAL DAILY
Qty: 90 TABLET | Refills: 0 | Status: SHIPPED | OUTPATIENT
Start: 2025-08-22

## (undated) DEVICE — TOTAL TRAY, 16FR 10ML SIL FOLEY, URN: Brand: MEDLINE

## (undated) DEVICE — TRY SKINPREP PVP SCRB W PAINT

## (undated) DEVICE — DRSNG GZ PETROLTM XEROFORM CURAD 1X8IN STRL

## (undated) DEVICE — 3M™ IOBAN™ 2 ANTIMICROBIAL INCISE DRAPE 6650EZ: Brand: IOBAN™ 2

## (undated) DEVICE — SUT VIC 3/0 SH 27IN J416H

## (undated) DEVICE — RICH MAJOR PROCEDURE: Brand: MEDLINE INDUSTRIES, INC.

## (undated) DEVICE — PENCL ES MEGADINE EZ/CLEAN BUTN W/HOLSTR 10FT

## (undated) DEVICE — SHEET,DRAPE,70X100,STERILE: Brand: MEDLINE

## (undated) DEVICE — CLAVICLE STRAP: Brand: DEROYAL

## (undated) DEVICE — PAD,ABDOMINAL,5"X9",STERILE,LF,1/PK: Brand: MEDLINE INDUSTRIES, INC.

## (undated) DEVICE — STRIP,CLOSURE,WOUND,MEDI-STRIP,1/2X4: Brand: MEDLINE

## (undated) DEVICE — 1000 S-DRAPE TOWEL DRAPE 10/BX: Brand: STERI-DRAPE™

## (undated) DEVICE — GLV SURG TRIUMPH ORTHO W/ALOE PF LTX 8 STRL

## (undated) DEVICE — FLEXIBLE YANKAUER,MEDIUM TIP, NO VACUUM CONTROL: Brand: ARGYLE

## (undated) DEVICE — PROXIMATE SKIN STAPLERS (35 WIDE) CONTAINS 35 STAINLESS STEEL STAPLES (FIXED HEAD): Brand: PROXIMATE

## (undated) DEVICE — SUT VIC 0 CT 36IN J958H

## (undated) DEVICE — DRSNG SURESITE123 6X8IN

## (undated) DEVICE — PAD UNDRCST WYTEX 4IN 4YD NS LF

## (undated) DEVICE — Device

## (undated) DEVICE — TBG PENCL TELESCP MEGADYNE SMOKE EVAC 10FT

## (undated) DEVICE — SUT ETHLN 5/0 PS2 18IN 1666G

## (undated) DEVICE — BLD CLIP UNIV SURG GRY

## (undated) DEVICE — GW THRD 2X230MM FOR 7MM CANN SCRW

## (undated) DEVICE — 3M™ STERI-DRAPE™ U-DRAPE 1015: Brand: STERI-DRAPE™

## (undated) DEVICE — GLV SURG SENSICARE SLT PF LF 8 STRL